# Patient Record
Sex: FEMALE | Race: BLACK OR AFRICAN AMERICAN | NOT HISPANIC OR LATINO | Employment: UNEMPLOYED | URBAN - METROPOLITAN AREA
[De-identification: names, ages, dates, MRNs, and addresses within clinical notes are randomized per-mention and may not be internally consistent; named-entity substitution may affect disease eponyms.]

---

## 2017-06-30 ENCOUNTER — HOSPITAL ENCOUNTER (OUTPATIENT)
Facility: HOSPITAL | Age: 42
LOS: 1 days | Discharge: HOME | End: 2017-07-01
Attending: EMERGENCY MEDICINE | Admitting: HOSPITALIST

## 2017-06-30 DIAGNOSIS — R53.1 WEAKNESS: ICD-10-CM

## 2017-06-30 DIAGNOSIS — R29.898 LEG WEAKNESS: ICD-10-CM

## 2017-06-30 DIAGNOSIS — D64.9 ANEMIA, UNSPECIFIED TYPE: ICD-10-CM

## 2017-06-30 DIAGNOSIS — R29.818: Primary | ICD-10-CM

## 2017-06-30 PROBLEM — F50.83 PICA IN ADULTS: Status: ACTIVE | Noted: 2017-06-30

## 2017-06-30 PROBLEM — G44.52 NEW DAILY PERSISTENT HEADACHE: Status: ACTIVE | Noted: 2017-06-30

## 2017-06-30 PROBLEM — D50.9 MICROCYTIC ANEMIA: Status: ACTIVE | Noted: 2017-06-30

## 2017-06-30 PROBLEM — N93.9 ABNORMAL UTERINE BLEEDING: Status: ACTIVE | Noted: 2017-06-30

## 2017-06-30 PROBLEM — F50.89 PICA IN ADULTS: Status: ACTIVE | Noted: 2017-06-30

## 2017-06-30 LAB
ABO + RH BLD: NORMAL
ALBUMIN SERPL BCP-MCNC: 3.5 G/DL
ALP SERPL-CCNC: 76 U/L
ALT SERPL W/O P-5'-P-CCNC: 11 U/L
ANION GAP SERPL CALC-SCNC: 7 MMOL/L
ANISOCYTOSIS BLD QL SMEAR: ABNORMAL
AST SERPL-CCNC: 15 U/L
B-HCG UR QL: NEGATIVE
BASO STIPL BLD QL SMEAR: ABNORMAL
BASOPHILS # BLD AUTO: 0.01 K/UL
BASOPHILS NFR BLD: 0.1 %
BILIRUB SERPL-MCNC: 0.4 MG/DL
BILIRUB UR QL STRIP: NEGATIVE
BLD GP AB SCN CELLS X3 SERPL QL: NORMAL
BLD PROD TYP BPU: NORMAL
BLOOD UNIT EXPIRATION DATE: NORMAL
BLOOD UNIT TYPE CODE: 5100
BLOOD UNIT TYPE: NORMAL
BUN SERPL-MCNC: 11 MG/DL
BURR CELLS BLD QL SMEAR: ABNORMAL
CALCIUM SERPL-MCNC: 9 MG/DL
CHLORIDE SERPL-SCNC: 108 MMOL/L
CLARITY UR REFRACT.AUTO: ABNORMAL
CO2 SERPL-SCNC: 24 MMOL/L
CODING SYSTEM: NORMAL
COLOR UR AUTO: YELLOW
CREAT SERPL-MCNC: 0.8 MG/DL
CTP QC/QA: YES
DACRYOCYTES BLD QL SMEAR: ABNORMAL
DIFFERENTIAL METHOD: ABNORMAL
DISPENSE STATUS: NORMAL
EOSINOPHIL # BLD AUTO: 0.1 K/UL
EOSINOPHIL NFR BLD: 0.7 %
ERYTHROCYTE [DISTWIDTH] IN BLOOD BY AUTOMATED COUNT: 23.8 %
EST. GFR  (AFRICAN AMERICAN): >60 ML/MIN/1.73 M^2
EST. GFR  (NON AFRICAN AMERICAN): >60 ML/MIN/1.73 M^2
GIANT PLATELETS BLD QL SMEAR: PRESENT
GLUCOSE SERPL-MCNC: 116 MG/DL
GLUCOSE UR QL STRIP: NEGATIVE
HCT VFR BLD AUTO: 24.1 %
HGB BLD-MCNC: 6.5 G/DL
HGB UR QL STRIP: NEGATIVE
HYPOCHROMIA BLD QL SMEAR: ABNORMAL
KETONES UR QL STRIP: NEGATIVE
LEUKOCYTE ESTERASE UR QL STRIP: NEGATIVE
LYMPHOCYTES # BLD AUTO: 2 K/UL
LYMPHOCYTES NFR BLD: 29.5 %
MCH RBC QN AUTO: 16.3 PG
MCHC RBC AUTO-ENTMCNC: 27 %
MCV RBC AUTO: 60 FL
MONOCYTES # BLD AUTO: 0.3 K/UL
MONOCYTES NFR BLD: 4.1 %
NEUTROPHILS # BLD AUTO: 4.4 K/UL
NEUTROPHILS NFR BLD: 65.6 %
NITRITE UR QL STRIP: NEGATIVE
OVALOCYTES BLD QL SMEAR: ABNORMAL
PH UR STRIP: 6 [PH] (ref 5–8)
PLATELET # BLD AUTO: 402 K/UL
PLATELET BLD QL SMEAR: ABNORMAL
PMV BLD AUTO: ABNORMAL FL
POIKILOCYTOSIS BLD QL SMEAR: SLIGHT
POLYCHROMASIA BLD QL SMEAR: ABNORMAL
POTASSIUM SERPL-SCNC: 3.4 MMOL/L
PROT SERPL-MCNC: 7.6 G/DL
PROT UR QL STRIP: NEGATIVE
RBC # BLD AUTO: 3.99 M/UL
SODIUM SERPL-SCNC: 139 MMOL/L
SP GR UR STRIP: 1.01 (ref 1–1.03)
TARGETS BLD QL SMEAR: ABNORMAL
TRANS ERYTHROCYTES VOL PATIENT: NORMAL ML
URN SPEC COLLECT METH UR: ABNORMAL
UROBILINOGEN UR STRIP-ACNC: 2 EU/DL
WBC # BLD AUTO: 6.75 K/UL

## 2017-06-30 PROCEDURE — 86900 BLOOD TYPING SEROLOGIC ABO: CPT

## 2017-06-30 PROCEDURE — A9585 GADOBUTROL INJECTION: HCPCS | Performed by: EMERGENCY MEDICINE

## 2017-06-30 PROCEDURE — 80053 COMPREHEN METABOLIC PANEL: CPT

## 2017-06-30 PROCEDURE — P9021 RED BLOOD CELLS UNIT: HCPCS

## 2017-06-30 PROCEDURE — 85025 COMPLETE CBC W/AUTO DIFF WBC: CPT

## 2017-06-30 PROCEDURE — 81025 URINE PREGNANCY TEST: CPT | Performed by: EMERGENCY MEDICINE

## 2017-06-30 PROCEDURE — G0378 HOSPITAL OBSERVATION PER HR: HCPCS

## 2017-06-30 PROCEDURE — 25000003 PHARM REV CODE 250: Performed by: STUDENT IN AN ORGANIZED HEALTH CARE EDUCATION/TRAINING PROGRAM

## 2017-06-30 PROCEDURE — 25500020 PHARM REV CODE 255: Performed by: EMERGENCY MEDICINE

## 2017-06-30 PROCEDURE — 99223 1ST HOSP IP/OBS HIGH 75: CPT | Mod: ,,, | Performed by: PSYCHIATRY & NEUROLOGY

## 2017-06-30 PROCEDURE — 86850 RBC ANTIBODY SCREEN: CPT

## 2017-06-30 PROCEDURE — 99285 EMERGENCY DEPT VISIT HI MDM: CPT | Mod: 25

## 2017-06-30 PROCEDURE — 86920 COMPATIBILITY TEST SPIN: CPT

## 2017-06-30 PROCEDURE — 36430 TRANSFUSION BLD/BLD COMPNT: CPT

## 2017-06-30 PROCEDURE — 99285 EMERGENCY DEPT VISIT HI MDM: CPT | Mod: ,,, | Performed by: EMERGENCY MEDICINE

## 2017-06-30 PROCEDURE — 81003 URINALYSIS AUTO W/O SCOPE: CPT

## 2017-06-30 RX ORDER — PROMETHAZINE HYDROCHLORIDE 12.5 MG/1
25 TABLET ORAL EVERY 6 HOURS PRN
Status: DISCONTINUED | OUTPATIENT
Start: 2017-06-30 | End: 2017-07-01 | Stop reason: HOSPADM

## 2017-06-30 RX ORDER — ACETAMINOPHEN 325 MG/1
650 TABLET ORAL EVERY 4 HOURS PRN
Status: DISCONTINUED | OUTPATIENT
Start: 2017-06-30 | End: 2017-07-01 | Stop reason: HOSPADM

## 2017-06-30 RX ORDER — GADOBUTROL 604.72 MG/ML
10 INJECTION INTRAVENOUS
Status: COMPLETED | OUTPATIENT
Start: 2017-06-30 | End: 2017-06-30

## 2017-06-30 RX ORDER — HYDROCODONE BITARTRATE AND ACETAMINOPHEN 500; 5 MG/1; MG/1
TABLET ORAL
Status: DISCONTINUED | OUTPATIENT
Start: 2017-06-30 | End: 2017-07-01 | Stop reason: HOSPADM

## 2017-06-30 RX ORDER — AMOXICILLIN 250 MG
1 CAPSULE ORAL 2 TIMES DAILY PRN
Status: DISCONTINUED | OUTPATIENT
Start: 2017-06-30 | End: 2017-07-01 | Stop reason: HOSPADM

## 2017-06-30 RX ORDER — SODIUM CHLORIDE 0.9 % (FLUSH) 0.9 %
3 SYRINGE (ML) INJECTION EVERY 8 HOURS
Status: DISCONTINUED | OUTPATIENT
Start: 2017-07-01 | End: 2017-07-01 | Stop reason: HOSPADM

## 2017-06-30 RX ORDER — ONDANSETRON 8 MG/1
8 TABLET, ORALLY DISINTEGRATING ORAL EVERY 8 HOURS PRN
Status: DISCONTINUED | OUTPATIENT
Start: 2017-06-30 | End: 2017-07-01 | Stop reason: HOSPADM

## 2017-06-30 RX ORDER — POLYETHYLENE GLYCOL 3350 17 G/17G
17 POWDER, FOR SOLUTION ORAL DAILY
Status: DISCONTINUED | OUTPATIENT
Start: 2017-07-01 | End: 2017-07-01 | Stop reason: HOSPADM

## 2017-06-30 RX ORDER — POTASSIUM CHLORIDE 20 MEQ/1
60 TABLET, EXTENDED RELEASE ORAL ONCE
Status: COMPLETED | OUTPATIENT
Start: 2017-06-30 | End: 2017-06-30

## 2017-06-30 RX ADMIN — POTASSIUM CHLORIDE 60 MEQ: 1500 TABLET, EXTENDED RELEASE ORAL at 09:06

## 2017-06-30 RX ADMIN — GADOBUTROL 10 ML: 604.72 INJECTION INTRAVENOUS at 06:06

## 2017-06-30 NOTE — ED PROVIDER NOTES
"Encounter Date: 2017    SCRIBE #1 NOTE: I, Sabrina Kendrickangel, am scribing for, and in the presence of, Dr. Benavides.       History     Chief Complaint   Patient presents with    Extremity Weakness     pt states "my leg keeps giving out on me" states she can stand and walk, but after walking around-- left leg becomes "shakey and weak"-- states called EMS this am and was evaluated at home-      Time seen by provider: 3:49 PM    This is a 41 y.o. female with no significnat past medical history who presents with complaint of lower extremity weakness. The patient reports that she was sweeping today around 11 AM when her left leg all of a sudden gave out on her. She states she had some numbness to the foot and leg. She states she has never had this happen before. The patient states she is feeling some improvement but is still having some lower extremity weakness when walking any kind of distance. The also reports headache for several days. The patient denies any back problems or recent injury. She denies any changes in bowel or bladder habits. She states she felt totally normal this morning when she woke up. The patient's friend also mentions that there has been a lot going on in the patient's life recently as her sister unexpectedly  yesterday.       The history is provided by the patient.     Review of patient's allergies indicates:  No Known Allergies  Past Medical History:   Diagnosis Date    Hx of ischemic right ITZ stroke 2017     History reviewed. No pertinent surgical history.  History reviewed. No pertinent family history.  Social History   Substance Use Topics    Smoking status: Current Every Day Smoker     Packs/day: 1.00     Types: Cigarettes    Smokeless tobacco: Never Used    Alcohol use 0.6 oz/week     1 Shots of liquor per week      Comment: occassionaly      Review of Systems   Constitutional: Negative for chills and fever.   HENT: Negative for facial swelling and nosebleeds.    Eyes: " Negative for visual disturbance.   Respiratory: Negative for cough and shortness of breath.    Cardiovascular: Negative for chest pain and palpitations.   Gastrointestinal: Negative for abdominal distention, abdominal pain, diarrhea, nausea and vomiting.   Genitourinary: Negative for difficulty urinating, dysuria, frequency and hematuria.   Musculoskeletal: Positive for gait problem. Negative for neck pain and neck stiffness.   Skin: Negative for rash.   Neurological: Positive for weakness (left leg) and numbness. Negative for seizures, syncope and speech difficulty.       Physical Exam     Initial Vitals [06/30/17 1516]   BP Pulse Resp Temp SpO2   129/73 92 18 98.9 °F (37.2 °C) --      MAP       91.67         Physical Exam    Nursing note and vitals reviewed.  Constitutional: She appears well-developed and well-nourished. She is not diaphoretic. No distress.   Patient is comfortable and well appearing.    HENT:   Head: Normocephalic and atraumatic.   Eyes: Conjunctivae and EOM are normal.   Neck: Normal range of motion. Neck supple.   Cardiovascular: Normal rate and regular rhythm.   No murmur heard.  Pulmonary/Chest: Breath sounds normal. No respiratory distress.   Comfortable respirations   Abdominal: Soft. She exhibits no distension. There is no tenderness. There is no guarding.   Musculoskeletal: Normal range of motion.   Neurological: She is alert and oriented to person, place, and time. No cranial nerve deficit.   No pronator drift  lower extremity 5/5 strength bilaterally  Plantar and dorsal flexion of foot 5/5  Sensation intact to all dermatomes,  Gait normal  No ataxia.    Skin: Skin is warm and dry. No rash noted.         ED Course   Procedures  Labs Reviewed   CBC W/ AUTO DIFFERENTIAL - Abnormal; Notable for the following:        Result Value    RBC 3.99 (*)     Hemoglobin 6.5 (*)     Hematocrit 24.1 (*)     MCV 60 (*)     MCH 16.3 (*)     MCHC 27.0 (*)     RDW 23.8 (*)     Platelets 402 (*)     Platelet  "Estimate Increased (*)     All other components within normal limits   COMPREHENSIVE METABOLIC PANEL - Abnormal; Notable for the following:     Potassium 3.4 (*)     Glucose 116 (*)     Anion Gap 7 (*)     All other components within normal limits   URINALYSIS, REFLEX TO URINE CULTURE - Abnormal; Notable for the following:     Appearance, UA Hazy (*)     All other components within normal limits    Narrative:     Preferred Collection Type->Urine, Clean Catch   POCT URINE PREGNANCY - Normal   TYPE & SCREEN   PREPARE RBC SOFT          X-Rays:   Independently Interpreted Readings:   Head CT: Right frontal mass vs. infarct     Medical Decision Making:   History:   Old Medical Records: I decided to obtain old medical records.  Initial Assessment:   40 yo f, healthy, + smoker, here s/p episode L LE weakness/numbness, onset 11 am today, improved PTA, though unclear exact duration of sx.  Pt reports leg "gave out" but that while lying on floor, she couldn't move leg at all and couldn't feel it.  No HA/back pain, no urinary/bowel sx.  On exam, VSS, neurologic exam completely normal.  Differential Diagnosis:   ? TIA vs radiculopathy (though no back pain) vs somatization disorder (sister  4 days ago, unknown cause)  Independently Interpreted Test(s):   I have ordered and independently interpreted X-rays - see prior notes.  Clinical Tests:   Lab Tests: Reviewed and Ordered  Radiological Study: Reviewed and Ordered  ED Management:  -labs  -CT head  -stroke team notified  Other:   I have discussed this case with another health care provider.            Scribe Attestation:   Scribe #1: I performed the above scribed service and the documentation accurately describes the services I performed. I attest to the accuracy of the note.    Attending Attestation:           Physician Attestation for Scribe:  Physician Attestation Statement for Scribe #1: I, Dr. Benavides, reviewed documentation, as scribed by Sabrina Brown in my presence, " and it is both accurate and complete.                 ED Course     5:58 PM  Vascular neurology evaluated pt soon after arrival  Would like pt to get a MRI/MRA of brain after CT head, presuming it is negative    6:38 PM  Discussed CT findings with vascular neurology.  They have reviewed CT and think that underlying mass a possibility.  Would like to await MRI before making disposition plan      7:25 PM  MRI shows old stroke R frontal lobe.  Discussed results with neurology - would like pt admitted to medicine  Discussed results with pt - denies any h/o stroke, no h/o L LE weakness prior to today  Also discussed severe anemia - pt reports h/o heavy menstrual periods and uterine fibroids, suspect this as etiology  Consented for transfusion and 1 U PRBC ordered    Clinical Impression:   The primary encounter diagnosis was Transient paralysis of left leg. Diagnoses of Weakness, Leg weakness, and Anemia, unspecified type were also pertinent to this visit.                           Tammie Benavides MD  07/02/17 3299

## 2017-06-30 NOTE — ASSESSMENT & PLAN NOTE
40 y/o right handed female, heavy smoker, presents to the ED due to acute onset right leg weakness-numbness-jerkiness, substantially improved at this time.   NIHSS 0. CT head pending.  Patient has no ostensible abnormality on neuro-exam.   Although she is a heavy smoker, I am not quite convinced that she has sustained a cerebrovascular insult involving the left ITZ territory and resulting on restricted symptomatology in her right leg.  It is worth mentioning that patient is under significant stress due to the unexpected death of her sister yesterday.  Agree with pursuing MRI/MRA brain if CT negative.  She may be able to go home if neuro-imaging negative.

## 2017-06-30 NOTE — CONSULTS
"[facility]  Vascular Neurology  Comprehensive Stroke Center  Consult Note    Consults  Assessment/Plan:     Patient is a 41 y.o. year old female with:    No notes have been filed under this hospital service.  Service: Vascular Neurology      Thrombolysis Candidate? No, patient has no objective findings on neuro-exam    Interventional Revascularization Candidate?  No; No large vessel occlusion    Research Candidate? No    Subjective:     History of Present Illness:  41 y.o. Right handed female with no significnat past medical history except for heavy smoking who presents to the ED complaining of acute onset of right lower extremity weakness-numbness- jerkiness  Patient said that she never had similar symptoms before. Stated that she was home, sweeping today around 11 AM when her left leg all of a sudden became very numb. " have no feeling on it and gave out on me when I tried to stand up and walk". Likewise, she reports that the right leg was " shaking, did not let me walk, and I couldn't raise it".  Denies pain in her lower back or right LE. No bladder or bowel impairment, vertigo, double vision, slurred speech, language or vision impairment.  No recent trauma or heavy lifting. No fever, rash, or recent medical illnesses.  Patient tells me that she is feeling some improvement but is still having some lower extremity weakness when walking any kind of distance. The also reports headache for several days. The patient denies any back problems or recent injury. She denies any changes in bowel or bladder habits. She states she felt totally normal this morning when she woke up. The patient's friend also mentions that there has been a lot going on in the patient's life recently as her sister unexpectedly  yesterday.  CT brain was already requested and I pending.         History reviewed. No pertinent past medical history.  History reviewed. No pertinent surgical history.  History reviewed. No " pertinent family history.  Social History   Substance Use Topics    Smoking status: Current Every Day Smoker     Packs/day: 1.00     Types: Cigarettes    Smokeless tobacco: Never Used    Alcohol use 0.6 oz/week     1 Shots of liquor per week      Comment: occassionaly      Review of patient's allergies indicates:  No Known Allergies  Medications: I have reviewed the current medication administration record.      (Not in a hospital admission)    Review of Systems   Constitutional: Negative for chills, fatigue, fever and unexpected weight change.   HENT: Negative for drooling, facial swelling and trouble swallowing.    Eyes: Negative for photophobia and visual disturbance.   Respiratory: Negative for choking and shortness of breath.    Cardiovascular: Negative for chest pain, palpitations and leg swelling.   Gastrointestinal: Negative for abdominal pain and blood in stool.   Endocrine: Negative for cold intolerance and polyuria.   Genitourinary: Negative for hematuria.   Musculoskeletal: Negative for arthralgias, back pain, gait problem and neck pain.   Skin: Negative for color change and rash.   Allergic/Immunologic: Negative for immunocompromised state.   Neurological: Positive for weakness, numbness and headaches. Negative for dizziness, tremors and seizures.   Hematological: Negative for adenopathy. Does not bruise/bleed easily.   Psychiatric/Behavioral: Negative for agitation, dysphoric mood and hallucinations.     Objective:     Vital Signs (Most Recent):  Temp: 98.9 °F (37.2 °C) (06/30/17 1516)  Pulse: 92 (06/30/17 1516)  Resp: 18 (06/30/17 1516)  BP: 129/73 (06/30/17 1516)    Vital Signs Range (Last 24H):  Temp:  [98.9 °F (37.2 °C)]   Pulse:  [92]   Resp:  [18]   BP: (129)/(73)     Physical Exam   Constitutional: She is oriented to person, place, and time. She appears well-developed and well-nourished.   HENT:   Head: Normocephalic and atraumatic.   Eyes: EOM are normal. Pupils are equal, round, and  reactive to light.   Neck: Normal range of motion. Neck supple.   Cardiovascular: Normal rate, regular rhythm and intact distal pulses.    No murmur heard.  Pulmonary/Chest: Effort normal and breath sounds normal.   Abdominal: Soft. She exhibits no mass. There is no guarding.   Genitourinary:   Genitourinary Comments: No perforemed   Musculoskeletal: Normal range of motion. She exhibits no edema, tenderness or deformity.   Neurological: She is alert and oriented to person, place, and time. She has normal reflexes. She displays normal reflexes. No cranial nerve deficit. She exhibits normal muscle tone. Coordination normal.   Skin: Skin is warm and dry.   Psychiatric: She has a normal mood and affect. Her behavior is normal.       Neurological Exam:   Mental status: alert and awake, oriented x 4, comprehension, naming, and repetition intact. No right to left confusion. Performs serial 7's without difficulty .No dysarthria.  CN 2-12: pupils 4 mm bilaterally, reactive to light. Fundi without papilledema. Visual fields full to confrontation. EOM full without nystagmus. Face sensation normal in all distributions. Face symmetric. Hearing grossly intact. Palate elevates well. Tongue midline without atrophy or fasciculations.  Motor: 5/5 all over  Sensory: intact in all modalities.  DTR's: 2+ all over.  Plantars: no tested.  Coordination: finger to nose and heel-knee-shin intact.  Gait: no ataxia or bradykinesia        NIH Stroke Scale:  Interval: baseline (upon arrival/admit)  Level of Consciousness: 0 - alert  LOC Questions: 0 - answers both correctly  LOC Commands: 0 - performs both correctly  Best Gaze: 0 - normal  Visual: 0 - no visual loss  Facial Palsy: 0 - normal  Motor Left Arm: 0 - no drift  Motor Right Arm: 0 - no drift  Motor Left Le - no drift  Motor Right Le - no drift  Limb Ataxia: 0 - absent  Sensory: 0 - normal  Best Language: 0 - no aphasia  Dysarthria: 0 - normal articulation  Extinction and  Inattention: 0 - no neglect  NIH Stroke Scale Total: 0  Modified Crook Scale:   Timeline:  Modified Crook Score: 0 - no symptoms    ABCD2 Scale for TIA:   Age > or = 60: 0 - no  B/P or = 140/9 at Initial Evaluation: 0 - no  Clinical Features of TIA: 0 - other symptoms  Duration of Symptoms: 2 - > or equal to 60 minutes  Diabetes Mellitus in History: 0 - no  ABCD2 Scale Total: 2      Laboratory:  Pending    Diagnostic Results:  Brain Imaging: CT head pending    Cerebrovascular Imaging: No done    Cervical Vascular Imaging: No done    Cardiac Evaluation:   EKG/Telemetry: Sinus rhythm      Gary Fernandez MD  Com[facility] St[facility]  Department of Vascular Neurology   [facility]

## 2017-06-30 NOTE — HPI
"41 y.o. Right handed female with no significnat past medical history except for heavy smoking who presents to the ED complaining of acute onset of right lower extremity weakness-numbness- jerkiness  Patient said that she never had similar symptoms before. Stated that she was home, sweeping today around 11 AM when her left leg all of a sudden became very numb. " have no feeling on it and gave out on me when I tried to stand up and walk". Likewise, she reports that the right leg was " shaking, did not let me walk, and I couldn't raise it".  Denies pain in her lower back or right LE. No bladder or bowel impairment, vertigo, double vision, slurred speech, language or vision impairment.  No recent trauma or heavy lifting. No fever, rash, or recent medical illnesses.  Patient tells me that she is feeling some improvement but is still having some lower extremity weakness when walking any kind of distance. The also reports headache for several days. The patient denies any back problems or recent injury. She denies any changes in bowel or bladder habits. She states she felt totally normal this morning when she woke up. The patient's friend also mentions that there has been a lot going on in the patient's life recently as her sister unexpectedly  yesterday.  CT brain was already requested and I pending.  "

## 2017-06-30 NOTE — ED TRIAGE NOTES
"Pt presents to the ED c c/o LLE numbness and a feeling that "I was going to fall". Pt states that the numbing sensation began at approx 1100. Pt states that she called EMS for them to examine her. Pt's VSs were stable when EMS arrived and the pt stated that she would see if it would improve. Pt also c/o HA 6/10 (frontal) Pt's sister just passed away and pt deels stressed at this time.     Pt denies CP/SOA/dizziness/syncope/fever/chills.   "

## 2017-06-30 NOTE — SUBJECTIVE & OBJECTIVE
History reviewed. No pertinent past medical history.  History reviewed. No pertinent surgical history.  History reviewed. No pertinent family history.  Social History   Substance Use Topics    Smoking status: Current Every Day Smoker     Packs/day: 1.00     Types: Cigarettes    Smokeless tobacco: Never Used    Alcohol use 0.6 oz/week     1 Shots of liquor per week      Comment: occassionaly      Review of patient's allergies indicates:  No Known Allergies  Medications: I have reviewed the current medication administration record.      (Not in a hospital admission)    Review of Systems   Constitutional: Negative for chills, fatigue, fever and unexpected weight change.   HENT: Negative for drooling, facial swelling and trouble swallowing.    Eyes: Negative for photophobia and visual disturbance.   Respiratory: Negative for choking and shortness of breath.    Cardiovascular: Negative for chest pain, palpitations and leg swelling.   Gastrointestinal: Negative for abdominal pain and blood in stool.   Endocrine: Negative for cold intolerance and polyuria.   Genitourinary: Negative for hematuria.   Musculoskeletal: Negative for arthralgias, back pain, gait problem and neck pain.   Skin: Negative for color change and rash.   Allergic/Immunologic: Negative for immunocompromised state.   Neurological: Positive for weakness, numbness and headaches. Negative for dizziness, tremors and seizures.   Hematological: Negative for adenopathy. Does not bruise/bleed easily.   Psychiatric/Behavioral: Negative for agitation, dysphoric mood and hallucinations.     Objective:     Vital Signs (Most Recent):  Temp: 98.9 °F (37.2 °C) (06/30/17 1516)  Pulse: 92 (06/30/17 1516)  Resp: 18 (06/30/17 1516)  BP: 129/73 (06/30/17 1516)    Vital Signs Range (Last 24H):  Temp:  [98.9 °F (37.2 °C)]   Pulse:  [92]   Resp:  [18]   BP: (129)/(73)     Physical Exam   Constitutional: She is oriented to person, place, and time. She appears  well-developed and well-nourished.   HENT:   Head: Normocephalic and atraumatic.   Eyes: EOM are normal. Pupils are equal, round, and reactive to light.   Neck: Normal range of motion. Neck supple.   Cardiovascular: Normal rate, regular rhythm and intact distal pulses.    No murmur heard.  Pulmonary/Chest: Effort normal and breath sounds normal.   Abdominal: Soft. She exhibits no mass. There is no guarding.   Genitourinary:   Genitourinary Comments: No perforemed   Musculoskeletal: Normal range of motion. She exhibits no edema, tenderness or deformity.   Neurological: She is alert and oriented to person, place, and time. She has normal reflexes. She displays normal reflexes. No cranial nerve deficit. She exhibits normal muscle tone. Coordination normal.   Skin: Skin is warm and dry.   Psychiatric: She has a normal mood and affect. Her behavior is normal.       Neurological Exam:   Mental status: alert and awake, oriented x 4, comprehension, naming, and repetition intact. No right to left confusion. Performs serial 7's without difficulty .No dysarthria.  CN 2-12: pupils 4 mm bilaterally, reactive to light. Fundi without papilledema. Visual fields full to confrontation. EOM full without nystagmus. Face sensation normal in all distributions. Face symmetric. Hearing grossly intact. Palate elevates well. Tongue midline without atrophy or fasciculations.  Motor: 5/5 all over  Sensory: intact in all modalities.  DTR's: 2+ all over.  Plantars: no tested.  Coordination: finger to nose and heel-knee-shin intact.  Gait: no ataxia or bradykinesia        NIH Stroke Scale:  Interval: baseline (upon arrival/admit)  Level of Consciousness: 0 - alert  LOC Questions: 0 - answers both correctly  LOC Commands: 0 - performs both correctly  Best Gaze: 0 - normal  Visual: 0 - no visual loss  Facial Palsy: 0 - normal  Motor Left Arm: 0 - no drift  Motor Right Arm: 0 - no drift  Motor Left Le - no drift  Motor Right Le - no  drift  Limb Ataxia: 0 - absent  Sensory: 0 - normal  Best Language: 0 - no aphasia  Dysarthria: 0 - normal articulation  Extinction and Inattention: 0 - no neglect  NIH Stroke Scale Total: 0  Modified Greenwood Scale:   Timeline:  Modified Dania Score: 0 - no symptoms    ABCD2 Scale for TIA:   Age > or = 60: 0 - no  B/P or = 140/9 at Initial Evaluation: 0 - no  Clinical Features of TIA: 0 - other symptoms  Duration of Symptoms: 2 - > or equal to 60 minutes  Diabetes Mellitus in History: 0 - no  ABCD2 Scale Total: 2      Laboratory:  Pending    Diagnostic Results:  Brain Imaging: CT head pending    Cerebrovascular Imaging: No done    Cervical Vascular Imaging: No done    Cardiac Evaluation:   EKG/Telemetry: Sinus rhythm

## 2017-07-01 VITALS
WEIGHT: 165 LBS | SYSTOLIC BLOOD PRESSURE: 124 MMHG | RESPIRATION RATE: 18 BRPM | HEIGHT: 65 IN | HEART RATE: 63 BPM | TEMPERATURE: 98 F | BODY MASS INDEX: 27.49 KG/M2 | OXYGEN SATURATION: 99 % | DIASTOLIC BLOOD PRESSURE: 72 MMHG

## 2017-07-01 PROBLEM — Z86.73: Status: ACTIVE | Noted: 2017-07-01

## 2017-07-01 LAB
ANION GAP SERPL CALC-SCNC: 6 MMOL/L
ANISOCYTOSIS BLD QL SMEAR: SLIGHT
BASOPHILS # BLD AUTO: 0.01 K/UL
BASOPHILS NFR BLD: 0.1 %
BUN SERPL-MCNC: 11 MG/DL
CALCIUM SERPL-MCNC: 8.7 MG/DL
CHLORIDE SERPL-SCNC: 111 MMOL/L
CO2 SERPL-SCNC: 23 MMOL/L
CREAT SERPL-MCNC: 0.8 MG/DL
DIFFERENTIAL METHOD: ABNORMAL
EOSINOPHIL # BLD AUTO: 0.1 K/UL
EOSINOPHIL NFR BLD: 1.3 %
ERYTHROCYTE [DISTWIDTH] IN BLOOD BY AUTOMATED COUNT: 27 %
EST. GFR  (AFRICAN AMERICAN): >60 ML/MIN/1.73 M^2
EST. GFR  (NON AFRICAN AMERICAN): >60 ML/MIN/1.73 M^2
FERRITIN SERPL-MCNC: 4 NG/ML
GLUCOSE SERPL-MCNC: 99 MG/DL
HCT VFR BLD AUTO: 26.1 %
HGB BLD-MCNC: 7.6 G/DL
HYPOCHROMIA BLD QL SMEAR: ABNORMAL
INR PPP: 0.9
IRON SERPL-MCNC: 21 UG/DL
LYMPHOCYTES # BLD AUTO: 2.7 K/UL
LYMPHOCYTES NFR BLD: 35 %
MAGNESIUM SERPL-MCNC: 2.1 MG/DL
MCH RBC QN AUTO: 18.8 PG
MCHC RBC AUTO-ENTMCNC: 29.1 %
MCV RBC AUTO: 64 FL
MONOCYTES # BLD AUTO: 0.4 K/UL
MONOCYTES NFR BLD: 5.1 %
NEUTROPHILS # BLD AUTO: 4.6 K/UL
NEUTROPHILS NFR BLD: 58.5 %
OVALOCYTES BLD QL SMEAR: ABNORMAL
PHOSPHATE SERPL-MCNC: 3.4 MG/DL
PLATELET # BLD AUTO: 351 K/UL
PMV BLD AUTO: ABNORMAL FL
POIKILOCYTOSIS BLD QL SMEAR: SLIGHT
POLYCHROMASIA BLD QL SMEAR: ABNORMAL
POTASSIUM SERPL-SCNC: 4.3 MMOL/L
PROTHROMBIN TIME: 9.9 SEC
RBC # BLD AUTO: 4.05 M/UL
RETICS/RBC NFR AUTO: 0.8 %
SATURATED IRON: 6 %
SODIUM SERPL-SCNC: 140 MMOL/L
SPHEROCYTES BLD QL SMEAR: ABNORMAL
TOTAL IRON BINDING CAPACITY: 366 UG/DL
TRANSFERRIN SERPL-MCNC: 247 MG/DL
TSH SERPL DL<=0.005 MIU/L-ACNC: 1.32 UIU/ML
WBC # BLD AUTO: 7.82 K/UL

## 2017-07-01 PROCEDURE — 84100 ASSAY OF PHOSPHORUS: CPT

## 2017-07-01 PROCEDURE — 82728 ASSAY OF FERRITIN: CPT

## 2017-07-01 PROCEDURE — 25000003 PHARM REV CODE 250: Performed by: STUDENT IN AN ORGANIZED HEALTH CARE EDUCATION/TRAINING PROGRAM

## 2017-07-01 PROCEDURE — 97161 PT EVAL LOW COMPLEX 20 MIN: CPT

## 2017-07-01 PROCEDURE — 36415 COLL VENOUS BLD VENIPUNCTURE: CPT

## 2017-07-01 PROCEDURE — G8978 MOBILITY CURRENT STATUS: HCPCS | Mod: CI

## 2017-07-01 PROCEDURE — G8979 MOBILITY GOAL STATUS: HCPCS | Mod: CH

## 2017-07-01 PROCEDURE — G8980 MOBILITY D/C STATUS: HCPCS | Mod: CI

## 2017-07-01 PROCEDURE — 85025 COMPLETE CBC W/AUTO DIFF WBC: CPT

## 2017-07-01 PROCEDURE — 85610 PROTHROMBIN TIME: CPT

## 2017-07-01 PROCEDURE — 84443 ASSAY THYROID STIM HORMONE: CPT

## 2017-07-01 PROCEDURE — 80048 BASIC METABOLIC PNL TOTAL CA: CPT

## 2017-07-01 PROCEDURE — 85045 AUTOMATED RETICULOCYTE COUNT: CPT

## 2017-07-01 PROCEDURE — 99220 PR INITIAL OBSERVATION CARE,LEVL III: CPT | Mod: ,,, | Performed by: HOSPITALIST

## 2017-07-01 PROCEDURE — G0378 HOSPITAL OBSERVATION PER HR: HCPCS

## 2017-07-01 PROCEDURE — 83540 ASSAY OF IRON: CPT

## 2017-07-01 PROCEDURE — 83735 ASSAY OF MAGNESIUM: CPT

## 2017-07-01 PROCEDURE — 97116 GAIT TRAINING THERAPY: CPT

## 2017-07-01 RX ORDER — ATORVASTATIN CALCIUM 40 MG/1
40 TABLET, FILM COATED ORAL DAILY
Qty: 90 TABLET | Refills: 3 | Status: SHIPPED | OUTPATIENT
Start: 2017-07-01 | End: 2021-12-02 | Stop reason: SDUPTHER

## 2017-07-01 RX ORDER — FERROUS SULFATE 325(65) MG
325 TABLET, DELAYED RELEASE (ENTERIC COATED) ORAL 2 TIMES DAILY
Refills: 0 | COMMUNITY
Start: 2017-07-01 | End: 2017-07-01

## 2017-07-01 RX ORDER — NAPROXEN SODIUM 220 MG/1
81 TABLET, FILM COATED ORAL DAILY
Qty: 30 TABLET | Refills: 0 | COMMUNITY
Start: 2017-07-01 | End: 2017-07-01

## 2017-07-01 RX ORDER — FERROUS SULFATE 325(65) MG
325 TABLET, DELAYED RELEASE (ENTERIC COATED) ORAL 2 TIMES DAILY
Refills: 0 | COMMUNITY
Start: 2017-07-01 | End: 2021-07-25 | Stop reason: SDUPTHER

## 2017-07-01 RX ORDER — NAPROXEN SODIUM 220 MG/1
81 TABLET, FILM COATED ORAL DAILY
Qty: 30 TABLET | Refills: 0 | COMMUNITY
Start: 2017-07-01 | End: 2021-12-02 | Stop reason: SDUPTHER

## 2017-07-01 RX ORDER — ATORVASTATIN CALCIUM 40 MG/1
40 TABLET, FILM COATED ORAL DAILY
Qty: 90 TABLET | Refills: 3 | Status: SHIPPED | OUTPATIENT
Start: 2017-07-01 | End: 2017-07-01

## 2017-07-01 RX ADMIN — SODIUM CHLORIDE, PRESERVATIVE FREE 3 ML: 5 INJECTION INTRAVENOUS at 05:07

## 2017-07-01 RX ADMIN — ACETAMINOPHEN 650 MG: 325 TABLET ORAL at 12:07

## 2017-07-01 RX ADMIN — POLYETHYLENE GLYCOL 3350 17 G: 17 POWDER, FOR SOLUTION ORAL at 09:07

## 2017-07-01 NOTE — ASSESSMENT & PLAN NOTE
- Likely secondary to heavy menses, as reported by patient - using up to 10 pads daily.  - Fibroids or polyps most likely; may need transvaginal US either IP but likely as outpatient work-up.  - TSH, iron studies, coagulation studies ordered for tomorrow morning.  - With pica - eats starch.   - S/p 1U prbc. No active bleeding here.   - Daily CBC

## 2017-07-01 NOTE — CONSULTS
See consult note dated 06/30/2017 for full consult.    Erica Patrick, SHERWIN  Vascular Neurology

## 2017-07-01 NOTE — HPI
"Patti Hernandez is a 41 year old female who does not seek routine medical care who presents to the ED on 6/30 with one day history of lower extremity paralysis. Patient is not a good historian and is with flat and labile affect during the interview. Patient states that at 11am this morning, she was sweeping her floor in her house and suddenly felt numbness on the lateral side of her hip all the way down to the top of her foot. She states promptly after she lost complete motor function as well can couldn't feel her left lower extremity from her left hip down. She states that throughout the day, the leg would jerk occasionally and she would actually regain function before it going away promptly. She held off calling the ambulance hoping it would get better but eventually did. By the time she came to ED, both motor function was returning and sensory deficits went away gradually. She denies these symptoms occurring before in the past. She denies any change in urination or loss of bowel function. She does state that she in fact, only has a bowel movement 2-3 a month; she is sure of this. Otherwise, she has had a chronic banding headache for "some time" off and on daily. Patient does have heavy menstrual periods occasionally that started 2 years ago when she had a tubal ligation and sometimes the menstrual periods are normal, and sometimes they have heavy bleeding. She states her LMP ended several days ago and it was without heavy periods but her one last month she used 10+ daily. The duration of the periods are of 5 days. The patient states she enjoys eating starch. Patient denies any RLE deficits, denies back pain, denies recent illnesses, fevers, chills, nausea, vomiting.    Of note, patient states she has had significant stressors in her life. She had worked at [employer] but had to quit 2 months ago to take care of her mother who had a stroke and is bedridden. Her sister passed away suddenly 2 days ago (states that she " just stopped breathing - autopsy pending). Her father passed away from cancer one month ago. Her 12 year old daughter also passed away 2 years ago. Patient is a pack/day smoker

## 2017-07-01 NOTE — HOSPITAL COURSE
Imaging was done on patient including CT head without contrast, MRI and MRA brain, and MRA of neck with findings of no acute hemorrhage or infarct but with sequelae of old infarct in right frontal lobe and left cerebellum. Of note, patient's motor and sensory dysfunction resolved in ED. Labs were significant for hemoglobin of 6.5 and patient was transfused 1U PRBC.

## 2017-07-01 NOTE — DISCHARGE SUMMARY
DISCHARGE SUMMARY  Hospital Medicine    Team: [facility] MED 1    Patient Name: Patti Hernandez  YOB: 1975    Admit Date: 6/30/2017    Discharge Date: 07/01/2017    Discharge Attending Physician:  ARA TREVIÑO MD    Diagnoses:  Active Hospital Problems    Diagnosis  POA    *Transient paralysis of left leg [R29.818]  Yes     Priority: 1 - High    Microcytic anemia [D50.9]  Yes     Priority: 2     Abnormal uterine bleeding [N93.9]  Yes     Priority: 3     Pica in adults [F50.89]  Yes     Priority: 4     New daily persistent headache [G44.52]  Yes     Priority: 5     Hx of ischemic right ITZ stroke [Z86.73]  Not Applicable      Resolved Hospital Problems    Diagnosis Date Resolved POA   No resolved problems to display.       Discharged Condition: admit problems have stabilized       HOSPITAL COURSE:    Initial Presentation:  Patti Hernandez is a 41 year old female who does not seek routine medical care who presents to the ED on 6/30 with one day history of lower extremity paralysis. Patient is not a good historian and is with flat and labile affect during the interview. Patient states that at 11am this morning, she was sweeping her floor in her house and suddenly felt numbness on the lateral side of her hip all the way down to the top of her foot. She states promptly after she lost complete motor function as well can couldn't feel her left lower extremity from her left hip down. She states that throughout the day, the leg would jerk occasionally and she would actually regain function before it going away promptly. She held off calling the ambulance hoping it would get better but eventually did. By the time she came to ED, both motor function was returning and sensory deficits went away gradually. She denies these symptoms occurring before in the past. She denies any change in urination or loss of bowel function. She does state that she in fact, only has a bowel movement 2-3 a month; she is sure of  "this. Otherwise, she has had a chronic banding headache for "some time" off and on daily. Patient does have heavy menstrual periods occasionally that started 2 years ago when she had a tubal ligation and sometimes the menstrual periods are normal, and sometimes they have heavy bleeding. Patient states she often snacks on spoonfuls of corn starch. She states her LMP ended several days ago and it was without heavy periods but her one last month she used 10+ daily. The duration of the periods are of 5 days. The patient states she enjoys eating starch. Patient denies any RLE deficits, denies back pain, denies recent illnesses, fevers, chills, nausea, vomiting.    Of note, patient states she has had significant stressors in her life. She had worked at [employer] but had to quit 2 months ago to take care of her mother who had a stroke and is bedridden. Her sister passed away suddenly 2 days ago (states that she just stopped breathing - autopsy pending). Her father passed away from cancer one month ago. Her 12 year old daughter also passed away 2 years ago. Patient is a pack/day smoker      Course of Principle Problem for Admission:  Imaging was done on patient including CT head without contrast, MRI and MRA brain, and MRA of neck with findings of no acute hemorrhage or infarct but with sequelae of old infarct in right frontal lobe and left cerebellum. Of note, patient's motor and sensory dysfunction resolved in ED. Labs were significant for hemoglobin of 6.5 and patient was transfused 1U PRBC. Repeat hemoglobin on morning of 7/1 was 7.6 and patient denied any menorrhagia during her hospital course. It was believed that transient LLE paralysis was "unmasking" of past infarct triggered largely by iron deficiency anemia with contribution from social stressors. Physical therapy evaluated patient 7/ and did not recommend any home needs with AMPAC score of 23. She was discharged in stable condition on 7/1 with no motor/sensory " deficits and recommendations to follow-up here with vascular neurology and gynecology. She was discharged with recommendation to start on ASA 81 mg PO daily and atorvastatin 40 mg PO daily for secondary CVA prevention. She also instructed to start taking ferrous sulfate 350 mg by mouth twice daily for significant iron deficiency found here.       Review of Systems   Constitutional: Negative for appetite change, diaphoresis, fatigue, fever and unexpected weight change.   HENT: Negative for congestion.    Eyes: Negative for visual disturbance.   Respiratory: Negative for chest tightness and shortness of breath.    Cardiovascular: Negative for chest pain, palpitations and leg swelling.   Gastrointestinal: Positive for constipation. Negative for abdominal distention, diarrhea, nausea and vomiting.   Genitourinary: Positive for menstrual problem. Negative for urgency, vaginal bleeding, vaginal discharge and vaginal pain.   Musculoskeletal: Positive for arthralgias. Negative for back pain.   Skin: Negative for color change.   Neurological: Negative for dizziness, facial asymmetry, weakness and numbness.     Vital Signs (Most Recent):  Temp: 97.8 °F (36.6 °C) (07/01/17 1600)  Pulse: 63 (07/01/17 1600)  Resp: 18 (07/01/17 1600)  BP: 124/72 (07/01/17 1600)  SpO2: 99 % (07/01/17 1600) Vital Signs (24h Range):  Temp:  [97.2 °F (36.2 °C)-99.3 °F (37.4 °C)] 97.8 °F (36.6 °C)  Pulse:  [50-72] 63  Resp:  [15-20] 18  SpO2:  [97 %-100 %] 99 %  BP: (112-130)/(64-77) 124/72     Weight: 74.8 kg (165 lb)  Body mass index is 27.46 kg/m².    Intake/Output Summary (Last 24 hours) at 07/01/17 1716  Last data filed at 06/30/17 2245   Gross per 24 hour   Intake           752.92 ml   Output                0 ml   Net           752.92 ml      Physical Exam   Constitutional: She is oriented to person, place, and time. She appears well-developed and well-nourished. No distress.   HENT:   Head: Normocephalic and atraumatic.   Eyes: Conjunctivae and  EOM are normal. Pupils are equal, round, and reactive to light. No scleral icterus.   Neck:   Conjunctival pallor    Cardiovascular: Normal rate, regular rhythm and normal heart sounds.    No murmur heard.  Pulmonary/Chest: Effort normal and breath sounds normal. She has no wheezes.   Abdominal: Soft. Bowel sounds are normal. She exhibits no distension. There is no tenderness.   Musculoskeletal: Normal range of motion. She exhibits no edema.   5/5 UE and LE gross motor strength bilaterally.    Neurological: She is alert and oriented to person, place, and time. No cranial nerve deficit. Coordination (No gait abnormalities, walked to wall and back. ) normal.   Skin: Skin is warm and dry. She is not diaphoretic.     CONSULTS:    - Vascular Neurology     Other Pertinent Lab Findings:    Results for BIANKA KENNY (MRN [id number]) as of 7/1/2017 17:10   Ref. Range 6/30/2017 16:01 7/1/2017 04:27   Hemoglobin Latest Ref Range: 12.0 - 16.0 g/dL 6.5 (L) 7.6 (L)   Hematocrit Latest Ref Range: 37.0 - 48.5 % 24.1 (L) 26.1 (L)     Results for BIANKA KENNY (MRN [id number]) as of 7/1/2017 17:10   Ref. Range 7/1/2017 04:27   Iron Latest Ref Range: 30 - 160 ug/dL 21 (L)   TIBC Latest Ref Range: 250 - 450 ug/dL 366   Saturated Iron Latest Ref Range: 20 - 50 % 6 (L)   Transferrin Latest Ref Range: 200 - 375 mg/dL 247   Ferritin Latest Ref Range: 20.0 - 300.0 ng/mL 4 (L)       Pertinent/Significant Diagnostic Studies:    Imaging Results          MRI Brain W WO Contrast (Final result)  Result time 06/30/17 19:08:39   Procedure changed from MRI Brain Without Contrast     Final result by Zachery Gary MD (06/30/17 19:08:39)                 Impression:        No acute hemorrhage or infarct.    Foci of encephalomalacia within the right frontal lobe and left cerebellum, suggest sequela of previous infarct or insult.  No associated abnormal enhancement, mass effect, or edema.    No hemodynamically significant stenosis, occlusion, AV  malformation, or aneurysm of the anterior or posterior circulation, or within the major arterial structures of the neck.        Electronically signed by: HELEN CLARK MD  Date:     06/30/17  Time:    19:08              Narrative:    Comparison: CT 6/30/2017    Clinical history: Left leg weakness  Technique:    Multiplanar multi-sequence MRI images of the brain were obtained pre-and post the administration of Gadavist IV contrast.  MRI arteriography was performed of the [place] using time-of-flight technique.  Multiplanar multisequence MRI images of the neck were obtained pre-and post administration of IV gadavist contrast.        Findings:    There is an irregular focus of T2/flair signal abnormality within the periphery of the right frontal lobe, may reflect sequela of previous infarct or insult.  There is a remote appearing infarct involving the periphery of the left cerebellum.  There is no evidence of intracranial mass, hemorrhage, or infarction.  There is no restricted diffusion that would suggest acute ischemia.  The ventricular system is within normal limits of size for age and shows no evidence of hydrocephalus.  There are no significant extra-axial or extracranial abnormalities detected.    The globes, orbits, pituitary gland, pineal gland and craniocervical junction are normal in configuration.  Following administration of contrast, there are no abnormal areas of enhancement.  The major vascular flow voids are patent.    MRI arteriography was performed of the [place] using time-of-flight technique.  No hemodynamically significant stenosis, occlusion, 80 malformation, or aneurysm of the anterior or posterior circulation.  Bilateral posterior communicating arteries are identified.  The left vertebral artery is dominant.    The origins of the bilateral subclavian arteries are widely patent.  The origins of the bilateral common carotid arteries and internal carotid arteries are widely  patent as are the vessels themselves.  The origins of the bilateral vertebral arteries are widely patent.  The left vertebral artery is dominant.  Evaluation of the vertebral arteries is limited in its cervical portions as a result of venous contamination however no convincing high-grade stenosis or occlusion.                             MRA Brain without contrast (Final result)  Result time 06/30/17 19:08:38   Procedure changed from MRA Brain with contrast     Final result by Helen Gary MD (06/30/17 19:08:38)                 Impression:        No acute hemorrhage or infarct.    Foci of encephalomalacia within the right frontal lobe and left cerebellum, suggest sequela of previous infarct or insult.  No associated abnormal enhancement, mass effect, or edema.    No hemodynamically significant stenosis, occlusion, AV malformation, or aneurysm of the anterior or posterior circulation, or within the major arterial structures of the neck.        Electronically signed by: HELEN GARY MD  Date:     06/30/17  Time:    19:08              Narrative:    Comparison: CT 6/30/2017    Clinical history: Left leg weakness  Technique:    Multiplanar multi-sequence MRI images of the brain were obtained pre-and post the administration of Gadavist IV contrast.  MRI arteriography was performed of the [place] using time-of-flight technique.  Multiplanar multisequence MRI images of the neck were obtained pre-and post administration of IV gadavist contrast.        Findings:    There is an irregular focus of T2/flair signal abnormality within the periphery of the right frontal lobe, may reflect sequela of previous infarct or insult.  There is a remote appearing infarct involving the periphery of the left cerebellum.  There is no evidence of intracranial mass, hemorrhage, or infarction.  There is no restricted diffusion that would suggest acute ischemia.  The ventricular system is within normal limits of size for age and  shows no evidence of hydrocephalus.  There are no significant extra-axial or extracranial abnormalities detected.    The globes, orbits, pituitary gland, pineal gland and craniocervical junction are normal in configuration.  Following administration of contrast, there are no abnormal areas of enhancement.  The major vascular flow voids are patent.    MRI arteriography was performed of the [place] using time-of-flight technique.  No hemodynamically significant stenosis, occlusion, 80 malformation, or aneurysm of the anterior or posterior circulation.  Bilateral posterior communicating arteries are identified.  The left vertebral artery is dominant.    The origins of the bilateral subclavian arteries are widely patent.  The origins of the bilateral common carotid arteries and internal carotid arteries are widely patent as are the vessels themselves.  The origins of the bilateral vertebral arteries are widely patent.  The left vertebral artery is dominant.  Evaluation of the vertebral arteries is limited in its cervical portions as a result of venous contamination however no convincing high-grade stenosis or occlusion.                             MRA Neck with contrast (Final result)  Result time 06/30/17 19:08:38    Final result by Helen Gary MD (06/30/17 19:08:38)                 Impression:        No acute hemorrhage or infarct.    Foci of encephalomalacia within the right frontal lobe and left cerebellum, suggest sequela of previous infarct or insult.  No associated abnormal enhancement, mass effect, or edema.    No hemodynamically significant stenosis, occlusion, AV malformation, or aneurysm of the anterior or posterior circulation, or within the major arterial structures of the neck.        Electronically signed by: HELEN GARY MD  Date:     06/30/17  Time:    19:08              Narrative:    Comparison: CT 6/30/2017    Clinical history: Left leg weakness  Technique:    Multiplanar  multi-sequence MRI images of the brain were obtained pre-and post the administration of Gadavist IV contrast.  MRI arteriography was performed of the [place] using time-of-flight technique.  Multiplanar multisequence MRI images of the neck were obtained pre-and post administration of IV gadavist contrast.        Findings:    There is an irregular focus of T2/flair signal abnormality within the periphery of the right frontal lobe, may reflect sequela of previous infarct or insult.  There is a remote appearing infarct involving the periphery of the left cerebellum.  There is no evidence of intracranial mass, hemorrhage, or infarction.  There is no restricted diffusion that would suggest acute ischemia.  The ventricular system is within normal limits of size for age and shows no evidence of hydrocephalus.  There are no significant extra-axial or extracranial abnormalities detected.    The globes, orbits, pituitary gland, pineal gland and craniocervical junction are normal in configuration.  Following administration of contrast, there are no abnormal areas of enhancement.  The major vascular flow voids are patent.    MRI arteriography was performed of the [place] using time-of-flight technique.  No hemodynamically significant stenosis, occlusion, 80 malformation, or aneurysm of the anterior or posterior circulation.  Bilateral posterior communicating arteries are identified.  The left vertebral artery is dominant.    The origins of the bilateral subclavian arteries are widely patent.  The origins of the bilateral common carotid arteries and internal carotid arteries are widely patent as are the vessels themselves.  The origins of the bilateral vertebral arteries are widely patent.  The left vertebral artery is dominant.  Evaluation of the vertebral arteries is limited in its cervical portions as a result of venous contamination however no convincing high-grade stenosis or occlusion.                              CT Head Without Contrast (Final result)  Result time 06/30/17 18:07:54    Final result by Jame Lino MD (06/30/17 18:07:54)                 Impression:         2 regions of hypoattenuation with slight volume loss in the right inferior lateral frontal lobe, likely sequela of remote infarct or trauma; however, superimposed acute or subacute infarct is not excluded in light of the patient's history. No evidence of hemorrhage. Further evaluation/followup as warranted.  ______________________________________     Electronically signed by resident: DEMOND GARBER MD  Date:     06/30/17  Time:    17:54            As the supervising and teaching physician, I personally reviewed the images and resident's interpretation and I agree with the findings.          Electronically signed by: JAME LINO MD, MD  Date:     06/30/17  Time:    18:07              Narrative:    CT head without contrast    06/30/17 17:19:12    Accession# [id number]    CLINICAL INDICATION: 41 year old F with left leg weakness.    TECHNIQUE: Axial CT images obtained throughout the region of the head without the use of intravenous contrast. Axial, sagittal and coronal reconstructions were performed.    COMPARISON: No priors.    FINDINGS:    The ventricles are normal in size without evidence of hydrocephalus.    2 regions of peripheral hypoattenuation with slight volume loss extending to the cortex noted in the right inferior lateral frontal lobe with the larger noted to be more hypodense. These areas could represent sequela of remote infarct or trauma; however, a superimposed acute or subacute infarct cannot be excluded.    No parenchymal mass, hemorrhage, or edema.    No extra-axial blood or fluid collections.    The cranium appears intact. Mastoid air cells and paranasal sinuses are clear.                              Special Treatments/Procedures: *  - 1U PRBC transfusion    Disposition:  Home       Follow-up Plans from This  "Hospitalization:  We believe that your left lower leg weakness was secondary to an "unmasking" of a previous stroke that you had some time in the past. It could have been weeks, months, or even years ago. We believe this unmasking was triggered by stressors such as life events but also because of your low blood count because of heavy vaginal bleeding.    We have given you blood which has improved your blood count and likely helped resolve your paralysis, but we highly recommend you visit a gynecologist to have the bleeding worked up. You would likely need a pelvic ultrasound to remove fibroids.     Otherwise, because of your past stroke, we advise you to start taking a baby aspirin 81 mg that you can get over the counter. Take this daily. We also advise you take a cholesterol medication called Lipitor. Take 40 mg by mouth once daily - this was sent to the [facility] on [facility] in [place], [place].     For your blood counts, we HIGHLY recommend you start taking iron supplements; you can find them over the counter. Take one ferrous sulfate tablet by mouth TWICE daily.     We will refer you to our neurology and gynecology department, and they will call you to set up an appointment. If not, please give them a call. I will also speak with our  to try to get you into see [facility] gynecology as it would likely be more cost -efficient.    Last CBC/BMP/HgbA1c (if applicable):  Recent Results (from the past 336 hour(s))   CBC auto differential    Collection Time: 07/01/17  4:27 AM   Result Value Ref Range    WBC 7.82 3.90 - 12.70 K/uL    Hemoglobin 7.6 (L) 12.0 - 16.0 g/dL    Hematocrit 26.1 (L) 37.0 - 48.5 %    Platelets 351 (H) 150 - 350 K/uL   CBC auto differential    Collection Time: 06/30/17  4:01 PM   Result Value Ref Range    WBC 6.75 3.90 - 12.70 K/uL    Hemoglobin 6.5 (L) 12.0 - 16.0 g/dL    Hematocrit 24.1 (L) 37.0 - 48.5 %    Platelets 402 (H) 150 - 350 K/uL     Recent Results (from the past 336 hour(s)) "   Basic metabolic panel    Collection Time: 07/01/17  4:27 AM   Result Value Ref Range    Sodium 140 136 - 145 mmol/L    Potassium 4.3 3.5 - 5.1 mmol/L    Chloride 111 (H) 95 - 110 mmol/L    CO2 23 23 - 29 mmol/L    BUN, Bld 11 6 - 20 mg/dL    Creatinine 0.8 0.5 - 1.4 mg/dL    Calcium 8.7 8.7 - 10.5 mg/dL    Anion Gap 6 (L) 8 - 16 mmol/L     No results found for: HGBA1C    Discharge Medication List:     Medication List      START taking these medications    aspirin 81 MG Chew  Take 1 tablet (81 mg total) by mouth once daily.     atorvastatin 40 MG tablet  Commonly known as:  LIPITOR  Take 1 tablet (40 mg total) by mouth once daily.     ferrous sulfate 325 (65 FE) MG EC tablet  Take 1 tablet (325 mg total) by mouth 2 (two) times daily.        ASK your doctor about these medications    amoxicillin 500 MG capsule  Commonly known as:  AMOXIL     hydrocodone-acetaminophen 5-325mg 5-325 mg per tablet  Commonly known as:  NORCO  Take 1 tablet by mouth every 4 (four) hours as needed.     promethazine 25 MG tablet  Commonly known as:  PHENERGAN  Take 1 tablet (25 mg total) by mouth every 6 (six) hours as needed for Nausea.           Where to Get Your Medications      These medications were sent to [facility] [address] [place] [address] [address] AT [facility] [facility]  [address] [facility] [facility] [address]    Phone:  [phone]   · atorvastatin 40 MG tablet     You can get these medications from any pharmacy    You don't need a prescription for these medications  · aspirin 81 MG Chew  · ferrous sulfate 325 (65 FE) MG EC tablet         Patient Instructions:    Discharge Procedure Orders  Ambulatory Referral to Vascular Neurology   Referral Priority: Routine Referral Type: Consultation   Referral Reason: Specialty Services Required    Requested Specialty: Vascular Neurology    Number of Visits Requested: 1      Ambulatory Referral to Gynecology   Referral Priority: Routine Referral Type:  Consultation   Referral Reason: Specialty Services Required    Requested Specialty: Gynecology    Number of Visits Requested: 1          Signing Physician:    Lorenzo Stern MD  PGY-1 Internal Medicine  [phone]

## 2017-07-01 NOTE — SUBJECTIVE & OBJECTIVE
History reviewed. No pertinent past medical history.    History reviewed. No pertinent surgical history.    Review of patient's allergies indicates:  No Known Allergies    No current facility-administered medications on file prior to encounter.      Current Outpatient Prescriptions on File Prior to Encounter   Medication Sig    [DISCONTINUED] amoxicillin (AMOXIL) 500 MG capsule Take 500 mg by mouth 3 (three) times daily.    [DISCONTINUED] hydrocodone-acetaminophen 5-325mg (NORCO) 5-325 mg per tablet Take 1 tablet by mouth every 4 (four) hours as needed.    [DISCONTINUED] promethazine (PHENERGAN) 25 MG tablet Take 1 tablet (25 mg total) by mouth every 6 (six) hours as needed for Nausea.     Family History     None        Social History Main Topics    Smoking status: Current Every Day Smoker     Packs/day: 1.00     Types: Cigarettes    Smokeless tobacco: Never Used    Alcohol use 0.6 oz/week     1 Shots of liquor per week      Comment: occassionaly     Drug use: No    Sexual activity: Yes     Partners: Male     Review of Systems   Constitutional: Positive for appetite change and fatigue. Negative for diaphoresis, fever and unexpected weight change.   HENT: Negative for congestion.    Eyes: Negative for visual disturbance.   Respiratory: Negative for chest tightness and shortness of breath.    Cardiovascular: Negative for chest pain, palpitations and leg swelling.   Gastrointestinal: Positive for constipation. Negative for abdominal distention, diarrhea, nausea and vomiting.   Genitourinary: Positive for menstrual problem and vaginal bleeding. Negative for urgency, vaginal discharge and vaginal pain.   Musculoskeletal: Positive for arthralgias. Negative for back pain.   Skin: Negative for color change.   Neurological: Positive for weakness and numbness. Negative for dizziness and facial asymmetry.     Objective:     Vital Signs (Most Recent):  Temp: 99.1 °F (37.3 °C) (06/30/17 2050)  Pulse: 64 (06/30/17  2050)  Resp: 16 (06/30/17 2050)  BP: 126/77 (06/30/17 2050)  SpO2: 100 % (06/30/17 2050) Vital Signs (24h Range):  Temp:  [98.9 °F (37.2 °C)-99.3 °F (37.4 °C)] 99.1 °F (37.3 °C)  Pulse:  [61-92] 64  Resp:  [16-20] 16  SpO2:  [99 %-100 %] 100 %  BP: (126-130)/(72-77) 126/77     Weight: 74.8 kg (165 lb)  Body mass index is 27.46 kg/m².    Physical Exam   Constitutional: She is oriented to person, place, and time. She appears well-developed and well-nourished. No distress.   HENT:   Head: Normocephalic and atraumatic.   Eyes: Conjunctivae and EOM are normal. Pupils are equal, round, and reactive to light. No scleral icterus.   Neck:   Conjunctival pallor    Cardiovascular: Normal rate, regular rhythm and normal heart sounds.    No murmur heard.  Pulmonary/Chest: Effort normal and breath sounds normal. She has no wheezes.   Abdominal: Soft. Bowel sounds are normal. She exhibits no distension. There is no tenderness.   Musculoskeletal: Normal range of motion. She exhibits no edema.   5/5 UE and LE gross motor strength bilaterally.    Neurological: She is alert and oriented to person, place, and time. No cranial nerve deficit. Coordination (No gait abnormalities, walked to wall and back. ) normal.   Skin: Skin is warm and dry. She is not diaphoretic.        Significant Labs:   CBC:   Recent Labs  Lab 06/30/17  1601   WBC 6.75   HGB 6.5*   HCT 24.1*   *     CMP:   Recent Labs  Lab 06/30/17  1601      K 3.4*      CO2 24   *   BUN 11   CREATININE 0.8   CALCIUM 9.0   PROT 7.6   ALBUMIN 3.5   BILITOT 0.4   ALKPHOS 76   AST 15   ALT 11   ANIONGAP 7*   EGFRNONAA >60.0       Significant Imaging:   Imaging Results          MRI Brain W WO Contrast (Final result)  Result time 06/30/17 19:08:39   Procedure changed from MRI Brain Without Contrast     Final result by Zachery Gary MD (06/30/17 19:08:39)                 Impression:        No acute hemorrhage or infarct.    Foci of encephalomalacia within  the right frontal lobe and left cerebellum, suggest sequela of previous infarct or insult.  No associated abnormal enhancement, mass effect, or edema.    No hemodynamically significant stenosis, occlusion, AV malformation, or aneurysm of the anterior or posterior circulation, or within the major arterial structures of the neck.        Electronically signed by: HELEN CLARK MD  Date:     06/30/17  Time:    19:08              Narrative:    Comparison: CT 6/30/2017    Clinical history: Left leg weakness  Technique:    Multiplanar multi-sequence MRI images of the brain were obtained pre-and post the administration of Gadavist IV contrast.  MRI arteriography was performed of the [place] using time-of-flight technique.  Multiplanar multisequence MRI images of the neck were obtained pre-and post administration of IV gadavist contrast.        Findings:    There is an irregular focus of T2/flair signal abnormality within the periphery of the right frontal lobe, may reflect sequela of previous infarct or insult.  There is a remote appearing infarct involving the periphery of the left cerebellum.  There is no evidence of intracranial mass, hemorrhage, or infarction.  There is no restricted diffusion that would suggest acute ischemia.  The ventricular system is within normal limits of size for age and shows no evidence of hydrocephalus.  There are no significant extra-axial or extracranial abnormalities detected.    The globes, orbits, pituitary gland, pineal gland and craniocervical junction are normal in configuration.  Following administration of contrast, there are no abnormal areas of enhancement.  The major vascular flow voids are patent.    MRI arteriography was performed of the [place] using time-of-flight technique.  No hemodynamically significant stenosis, occlusion, 80 malformation, or aneurysm of the anterior or posterior circulation.  Bilateral posterior communicating arteries are identified.   The left vertebral artery is dominant.    The origins of the bilateral subclavian arteries are widely patent.  The origins of the bilateral common carotid arteries and internal carotid arteries are widely patent as are the vessels themselves.  The origins of the bilateral vertebral arteries are widely patent.  The left vertebral artery is dominant.  Evaluation of the vertebral arteries is limited in its cervical portions as a result of venous contamination however no convincing high-grade stenosis or occlusion.                             MRA Brain without contrast (Final result)  Result time 06/30/17 19:08:38   Procedure changed from MRA Brain with contrast     Final result by Helen Gary MD (06/30/17 19:08:38)                 Impression:        No acute hemorrhage or infarct.    Foci of encephalomalacia within the right frontal lobe and left cerebellum, suggest sequela of previous infarct or insult.  No associated abnormal enhancement, mass effect, or edema.    No hemodynamically significant stenosis, occlusion, AV malformation, or aneurysm of the anterior or posterior circulation, or within the major arterial structures of the neck.        Electronically signed by: HELEN GARY MD  Date:     06/30/17  Time:    19:08              Narrative:    Comparison: CT 6/30/2017    Clinical history: Left leg weakness  Technique:    Multiplanar multi-sequence MRI images of the brain were obtained pre-and post the administration of Gadavist IV contrast.  MRI arteriography was performed of the [place] using time-of-flight technique.  Multiplanar multisequence MRI images of the neck were obtained pre-and post administration of IV gadavist contrast.        Findings:    There is an irregular focus of T2/flair signal abnormality within the periphery of the right frontal lobe, may reflect sequela of previous infarct or insult.  There is a remote appearing infarct involving the periphery of the left cerebellum.   There is no evidence of intracranial mass, hemorrhage, or infarction.  There is no restricted diffusion that would suggest acute ischemia.  The ventricular system is within normal limits of size for age and shows no evidence of hydrocephalus.  There are no significant extra-axial or extracranial abnormalities detected.    The globes, orbits, pituitary gland, pineal gland and craniocervical junction are normal in configuration.  Following administration of contrast, there are no abnormal areas of enhancement.  The major vascular flow voids are patent.    MRI arteriography was performed of the [place] using time-of-flight technique.  No hemodynamically significant stenosis, occlusion, 80 malformation, or aneurysm of the anterior or posterior circulation.  Bilateral posterior communicating arteries are identified.  The left vertebral artery is dominant.    The origins of the bilateral subclavian arteries are widely patent.  The origins of the bilateral common carotid arteries and internal carotid arteries are widely patent as are the vessels themselves.  The origins of the bilateral vertebral arteries are widely patent.  The left vertebral artery is dominant.  Evaluation of the vertebral arteries is limited in its cervical portions as a result of venous contamination however no convincing high-grade stenosis or occlusion.                             MRA Neck with contrast (Final result)  Result time 06/30/17 19:08:38    Final result by Zachery Gary MD (06/30/17 19:08:38)                 Impression:        No acute hemorrhage or infarct.    Foci of encephalomalacia within the right frontal lobe and left cerebellum, suggest sequela of previous infarct or insult.  No associated abnormal enhancement, mass effect, or edema.    No hemodynamically significant stenosis, occlusion, AV malformation, or aneurysm of the anterior or posterior circulation, or within the major arterial structures of the  neck.        Electronically signed by: HELEN CLARK MD  Date:     06/30/17  Time:    19:08              Narrative:    Comparison: CT 6/30/2017    Clinical history: Left leg weakness  Technique:    Multiplanar multi-sequence MRI images of the brain were obtained pre-and post the administration of Gadavist IV contrast.  MRI arteriography was performed of the [place] using time-of-flight technique.  Multiplanar multisequence MRI images of the neck were obtained pre-and post administration of IV gadavist contrast.        Findings:    There is an irregular focus of T2/flair signal abnormality within the periphery of the right frontal lobe, may reflect sequela of previous infarct or insult.  There is a remote appearing infarct involving the periphery of the left cerebellum.  There is no evidence of intracranial mass, hemorrhage, or infarction.  There is no restricted diffusion that would suggest acute ischemia.  The ventricular system is within normal limits of size for age and shows no evidence of hydrocephalus.  There are no significant extra-axial or extracranial abnormalities detected.    The globes, orbits, pituitary gland, pineal gland and craniocervical junction are normal in configuration.  Following administration of contrast, there are no abnormal areas of enhancement.  The major vascular flow voids are patent.    MRI arteriography was performed of the [place] using time-of-flight technique.  No hemodynamically significant stenosis, occlusion, 80 malformation, or aneurysm of the anterior or posterior circulation.  Bilateral posterior communicating arteries are identified.  The left vertebral artery is dominant.    The origins of the bilateral subclavian arteries are widely patent.  The origins of the bilateral common carotid arteries and internal carotid arteries are widely patent as are the vessels themselves.  The origins of the bilateral vertebral arteries are widely patent.  The left  vertebral artery is dominant.  Evaluation of the vertebral arteries is limited in its cervical portions as a result of venous contamination however no convincing high-grade stenosis or occlusion.                             CT Head Without Contrast (Final result)  Result time 06/30/17 18:07:54    Final result by Jame Lino MD (06/30/17 18:07:54)                 Impression:         2 regions of hypoattenuation with slight volume loss in the right inferior lateral frontal lobe, likely sequela of remote infarct or trauma; however, superimposed acute or subacute infarct is not excluded in light of the patient's history. No evidence of hemorrhage. Further evaluation/followup as warranted.  ______________________________________     Electronically signed by resident: DEMOND GARBER MD  Date:     06/30/17  Time:    17:54            As the supervising and teaching physician, I personally reviewed the images and resident's interpretation and I agree with the findings.          Electronically signed by: JAME LINO MD, MD  Date:     06/30/17  Time:    18:07              Narrative:    CT head without contrast    06/30/17 17:19:12    Accession# [id number]    CLINICAL INDICATION: 41 year old F with left leg weakness.    TECHNIQUE: Axial CT images obtained throughout the region of the head without the use of intravenous contrast. Axial, sagittal and coronal reconstructions were performed.    COMPARISON: No priors.    FINDINGS:    The ventricles are normal in size without evidence of hydrocephalus.    2 regions of peripheral hypoattenuation with slight volume loss extending to the cortex noted in the right inferior lateral frontal lobe with the larger noted to be more hypodense. These areas could represent sequela of remote infarct or trauma; however, a superimposed acute or subacute infarct cannot be excluded.    No parenchymal mass, hemorrhage, or edema.    No extra-axial blood or fluid collections.    The cranium appears  intact. Mastoid air cells and paranasal sinuses are clear.

## 2017-07-01 NOTE — PT/OT/SLP EVAL
"Physical Therapy  Evaluation/  DISCHARGE    Patti Hernandez   MRN: [id number]   Admitting Diagnosis: Transient paralysis of left leg    PT Received On: 07/01/17  PT Start Time: 1134     PT Stop Time: 1202    PT Total Time (min): 28 min       Billable Minutes:  Evaluation 19 and Gait Training 9    Diagnosis: Transient paralysis of left leg      Past Medical History:   Diagnosis Date    Hx of ischemic right ITZ stroke 7/1/2017      History reviewed. No pertinent surgical history.    Referring physician: Lorenzo Stern MD  Date referred to PT: 6/30/17    General Precautions: Standard, fall  Orthopedic Precautions: N/A   Braces: N/A       Do you have any cultural, spiritual, [employer] conflicts, given your current situation?: no    Patient History:  Lives With: child(carri), dependent  Living Arrangements: house  Home Accessibility: stairs to enter home  Home Layout: Able to live on 1st floor  Number of Stairs to Enter Home: 3  Stair Railings at Home: outside, present at both sides  Transportation Available: car, family or friend will provide  Equipment Currently Used at Home: none  DME owned (not currently used): none    Previous Level of Function:  Ambulation Skills: independent  Transfer Skills: independent  ADL Skills: independent  Work/Leisure Activity: independent    Subjective:  Communicated with nursing prior to session.  "I got my four kids at home."    Chief Complaint: Fatigue  Patient goals: To go home    Pain/Comfort  Pain Rating 1: 0/10      Objective:   Patient found with: peripheral IV     Cognitive Exam:  Oriented to: Person, Place, Time and Situation    Follows Commands/attention: Follows multi-step commands  Communication: clear/fluent  Safety awareness/insight to disability: intact    Physical Exam:  Postural examination/scapula alignment: Anterior pelvic tilt    Skin integrity: Visible skin intact  Edema: None noted     Sensation:   Intact  light/touch B LEs    Upper Extremity Range of Motion:  Right " Upper Extremity: WFL  Left Upper Extremity: WFL    Upper Extremity Strength:  Right Upper Extremity: WFL  Left Upper Extremity: WFL    Lower Extremity Range of Motion:  Right Lower Extremity: WFL  Left Lower Extremity: WFL    Lower Extremity Strength:  Right Lower Extremity: WFL  Left Lower Extremity: WFL     Fine motor coordination:  Intact  RLE heel shin and LLE heel shin    Gross motor coordination: WFL    Functional Mobility:  Bed Mobility:  Scooting/Bridging: Independent  Supine to Sit: Independent  Sit to Supine: Independent    Transfers:  Sit <> Stand Assistance: Independent  Sit <> Stand Assistive Device: No Assistive Device    Gait:   Gait Distance: Pt amb 980'  Assistance 1: Independent  Gait Assistive Device: No device  Gait Pattern: swing-through gait  Gait Deviation(s): decreased wayne    Balance:   Static Sit: NORMAL: No deviations seen in posture held statically  Dynamic Sit: GOOD-: Maintains balance through MODERATE excursions of active trunk movement,     Static Stand: NORMAL: No deviations seen in posture held statically  Dynamic stand: GOOD+: Independent gait (with or without assistive device)    Therapeutic Activities and Exercises:  Whiteboard updated  [place] 10x sit<>stand: 17.68sec, 2/10 RPE following  6MWT: Pt amb 882', I, without AD, 2/10 RPE following    AM-PAC 6 CLICK MOBILITY  How much help from another person does this patient currently need?   1 = Unable, Total/Dependent Assistance  2 = A lot, Maximum/Moderate Assistance  3 = A little, Minimum/Contact Guard/Supervision  4 = None, Modified [place]/Independent    Turning over in bed (including adjusting bedclothes, sheets and blankets)?: 4  Sitting down on and standing up from a chair with arms (e.g., wheelchair, bedside commode, etc.): 4  Moving from lying on back to sitting on the side of the bed?: 4  Moving to and from a bed to a chair (including a wheelchair)?: 4  Need to walk in hospital room?: 4  Climbing 3-5 steps with a  railing?: 3  Total Score: 23     AM-PAC Raw Score CMS G-Code Modifier Level of Impairment Assistance   6 % Total / Unable   7 - 9 CM 80 - 100% Maximal Assist   10 - 14 CL 60 - 80% Moderate Assist   15 - 19 CK 40 - 60% Moderate Assist   20 - 22 CJ 20 - 40% Minimal Assist   23 CI 1-20% SBA / CGA   24 CH 0% Independent/ Mod I     Patient left supine with all lines intact, call button in reach and nursing notified.    Assessment:   Patti Hernandez is a 41 y.o. female with a medical diagnosis of Transient paralysis of left leg and presents with no remarkable abnormalities or deficits in UE/LE mm strength.  No impairments in coordination or cognition, other than fatigue.  Pt tolerated PT tx session well, though was not interested in participating.  Pt is to be discharged at this time, I with all mobility, no falls risk.    Rehab identified problem list/impairments: Rehab identified problem list/impairments: weakness    Rehab potential is good.    Activity tolerance: Fair    Discharge recommendations: Discharge Facility/Level Of Care Needs: home     Barriers to discharge: Barriers to Discharge: None    Equipment recommendations: Equipment Needed After Discharge: none     GOALS:    Physical Therapy Goals     Not on file                PLAN:    Patient to be seen   to address the above listed problems via    Plan of Care expires:    Plan of Care reviewed with: patient          Sherri Barber, PT  07/01/2017

## 2017-07-01 NOTE — NURSING
Pt discharged home. VSS. AVS reviewed and understanding verbalized by patient. IV removed, tip intact. Pt states she no longer uses the pharmacy at [facility] and would like her prescription to be sent to the [facility] in [place]. Dr. Stern notified.

## 2017-07-01 NOTE — ED NOTES
Charge nurse notified of pt H/H 6/24 and the possibility of needing a bed for possible transfusion

## 2017-07-01 NOTE — DISCHARGE INSTRUCTIONS
"We believe that your left lower leg weakness was secondary to an "unmasking" of a previous stroke that you had some time in the past. It could have been weeks, months, or even years ago. We believe this unmasking was triggered by stressors such as life events but also because of your low blood count because of heavy vaginal bleeding.    We have given you blood which has improved your blood count and likely helped resolve your paralysis, but we highly recommend you visit a gynecologist to have the bleeding worked up. You would likely need a pelvic ultrasound to remove fibroids.     Otherwise, because of your past stroke, we advise you to start taking a baby aspirin 81 mg that you can get over the counter. Take this daily. We also advise you take a cholesterol medication called Lipitor. Take 40 mg by mouth once daily - this was sent to the [email or web address] in [place], [place].     For your blood counts, we HIGHLY recommend you start taking iron supplements; you can find them over the counter. Take one ferrous sulfate tablet by mouth TWICE daily.     We will refer you to our neurology and gynecology department, and they will call you to set up an appointment. If not, please give them a call. I will also speak with our  to try to get you into see [facility] gynecology as it would likely be more cost -efficient.   "

## 2017-07-01 NOTE — ASSESSMENT & PLAN NOTE
- Reported sudden onset LLE numbness this morning with development to loss of motor function. Waxing and waning, with some jerking motions as well. No previous strokes, per patient.   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  - Differential includes unmasking of old infarct (considering location of infarct on imaging matching patient's symptoms) with stressor from anemia as well as possible conversion with recent stressors (documented in HPI)  - Symptoms have resolve, continue to monitor for another day.  - PT/OT  - Vascular neurology following patient.

## 2017-07-01 NOTE — H&P
"[facility] Medicine  History & Physical    Patient Name: Patti Hernandez  MRN: [id number]  Admission Date: 6/30/2017  Attending Physician: Misty Horan MD   Primary Care Provider: Primary Doctor [facility] Medicine Team: [facility] [place] [facility] 1 Lorenzo Stern MD     Patient information was obtained from patient and ER records.     Subjective:     Principal Problem:Transient paralysis of left leg    Chief Complaint:   Chief Complaint   Patient presents with    Extremity Weakness     pt states "my leg keeps giving out on me" states she can stand and walk, but after walking around-- left leg becomes "shakey and weak"-- states called EMS this am and was evaluated at home-         HPI: Patti Hernandez is a 41 year old female who does not seek routine medical care who presents to the ED on 6/30 with one day history of lower extremity paralysis. Patient is not a good historian and is with flat and labile affect during the interview. Patient states that at 11am this morning, she was sweeping her floor in her house and suddenly felt numbness on the lateral side of her hip all the way down to the top of her foot. She states promptly after she lost complete motor function as well can couldn't feel her left lower extremity from her left hip down. She states that throughout the day, the leg would jerk occasionally and she would actually regain function before it going away promptly. She held off calling the ambulance hoping it would get better but eventually did. By the time she came to ED, both motor function was returning and sensory deficits went away gradually. She denies these symptoms occurring before in the past. She denies any change in urination or loss of bowel function. She does state that she in fact, only has a bowel movement 2-3 a month; she is sure of this. Otherwise, she has had a chronic banding headache for "some time" off and on daily. Patient does have heavy menstrual periods " occasionally that started 2 years ago when she had a tubal ligation and sometimes the menstrual periods are normal, and sometimes they have heavy bleeding. She states her LMP ended several days ago and it was without heavy periods but her one last month she used 10+ daily. The duration of the periods are of 5 days. The patient states she enjoys eating starch. Patient denies any RLE deficits, denies back pain, denies recent illnesses, fevers, chills, nausea, vomiting.    Of note, patient states she has had significant stressors in her life. She had worked at [employer] but had to quit 2 months ago to take care of her mother who had a stroke and is bedridden. Her sister passed away suddenly 2 days ago (states that she just stopped breathing - autopsy pending). Her father passed away from cancer one month ago. Her 12 year old daughter also passed away 2 years ago. Patient is a pack/day smoker     History reviewed. No pertinent past medical history.    History reviewed. No pertinent surgical history.    Review of patient's allergies indicates:  No Known Allergies    No current facility-administered medications on file prior to encounter.      Current Outpatient Prescriptions on File Prior to Encounter   Medication Sig    [DISCONTINUED] amoxicillin (AMOXIL) 500 MG capsule Take 500 mg by mouth 3 (three) times daily.    [DISCONTINUED] hydrocodone-acetaminophen 5-325mg (NORCO) 5-325 mg per tablet Take 1 tablet by mouth every 4 (four) hours as needed.    [DISCONTINUED] promethazine (PHENERGAN) 25 MG tablet Take 1 tablet (25 mg total) by mouth every 6 (six) hours as needed for Nausea.     Family History     None        Social History Main Topics    Smoking status: Current Every Day Smoker     Packs/day: 1.00     Types: Cigarettes    Smokeless tobacco: Never Used    Alcohol use 0.6 oz/week     1 Shots of liquor per week      Comment: occassionaly     Drug use: No    Sexual activity: Yes     Partners: Male     Review of  Systems   Constitutional: Positive for appetite change and fatigue. Negative for diaphoresis, fever and unexpected weight change.   HENT: Negative for congestion.    Eyes: Negative for visual disturbance.   Respiratory: Negative for chest tightness and shortness of breath.    Cardiovascular: Negative for chest pain, palpitations and leg swelling.   Gastrointestinal: Positive for constipation. Negative for abdominal distention, diarrhea, nausea and vomiting.   Genitourinary: Positive for menstrual problem and vaginal bleeding. Negative for urgency, vaginal discharge and vaginal pain.   Musculoskeletal: Positive for arthralgias. Negative for back pain.   Skin: Negative for color change.   Neurological: Positive for weakness and numbness. Negative for dizziness and facial asymmetry.     Objective:     Vital Signs (Most Recent):  Temp: 99.1 °F (37.3 °C) (06/30/17 2050)  Pulse: 64 (06/30/17 2050)  Resp: 16 (06/30/17 2050)  BP: 126/77 (06/30/17 2050)  SpO2: 100 % (06/30/17 2050) Vital Signs (24h Range):  Temp:  [98.9 °F (37.2 °C)-99.3 °F (37.4 °C)] 99.1 °F (37.3 °C)  Pulse:  [61-92] 64  Resp:  [16-20] 16  SpO2:  [99 %-100 %] 100 %  BP: (126-130)/(72-77) 126/77     Weight: 74.8 kg (165 lb)  Body mass index is 27.46 kg/m².    Physical Exam   Constitutional: She is oriented to person, place, and time. She appears well-developed and well-nourished. No distress.   HENT:   Head: Normocephalic and atraumatic.   Eyes: Conjunctivae and EOM are normal. Pupils are equal, round, and reactive to light. No scleral icterus.   Neck:   Conjunctival pallor    Cardiovascular: Normal rate, regular rhythm and normal heart sounds.    No murmur heard.  Pulmonary/Chest: Effort normal and breath sounds normal. She has no wheezes.   Abdominal: Soft. Bowel sounds are normal. She exhibits no distension. There is no tenderness.   Musculoskeletal: Normal range of motion. She exhibits no edema.   5/5 UE and LE gross motor strength bilaterally.     Neurological: She is alert and oriented to person, place, and time. No cranial nerve deficit. Coordination (No gait abnormalities, walked to wall and back. ) normal.   Skin: Skin is warm and dry. She is not diaphoretic.        Significant Labs:   CBC:   Recent Labs  Lab 06/30/17  1601   WBC 6.75   HGB 6.5*   HCT 24.1*   *     CMP:   Recent Labs  Lab 06/30/17  1601      K 3.4*      CO2 24   *   BUN 11   CREATININE 0.8   CALCIUM 9.0   PROT 7.6   ALBUMIN 3.5   BILITOT 0.4   ALKPHOS 76   AST 15   ALT 11   ANIONGAP 7*   EGFRNONAA >60.0       Significant Imaging:   Imaging Results          MRI Brain W WO Contrast (Final result)  Result time 06/30/17 19:08:39   Procedure changed from MRI Brain Without Contrast     Final result by Helen Gary MD (06/30/17 19:08:39)                 Impression:        No acute hemorrhage or infarct.    Foci of encephalomalacia within the right frontal lobe and left cerebellum, suggest sequela of previous infarct or insult.  No associated abnormal enhancement, mass effect, or edema.    No hemodynamically significant stenosis, occlusion, AV malformation, or aneurysm of the anterior or posterior circulation, or within the major arterial structures of the neck.        Electronically signed by: HELEN GARY MD  Date:     06/30/17  Time:    19:08              Narrative:    Comparison: CT 6/30/2017    Clinical history: Left leg weakness  Technique:    Multiplanar multi-sequence MRI images of the brain were obtained pre-and post the administration of Gadavist IV contrast.  MRI arteriography was performed of the [place] using time-of-flight technique.  Multiplanar multisequence MRI images of the neck were obtained pre-and post administration of IV gadavist contrast.        Findings:    There is an irregular focus of T2/flair signal abnormality within the periphery of the right frontal lobe, may reflect sequela of previous infarct or insult.  There is a  remote appearing infarct involving the periphery of the left cerebellum.  There is no evidence of intracranial mass, hemorrhage, or infarction.  There is no restricted diffusion that would suggest acute ischemia.  The ventricular system is within normal limits of size for age and shows no evidence of hydrocephalus.  There are no significant extra-axial or extracranial abnormalities detected.    The globes, orbits, pituitary gland, pineal gland and craniocervical junction are normal in configuration.  Following administration of contrast, there are no abnormal areas of enhancement.  The major vascular flow voids are patent.    MRI arteriography was performed of the [place] using time-of-flight technique.  No hemodynamically significant stenosis, occlusion, 80 malformation, or aneurysm of the anterior or posterior circulation.  Bilateral posterior communicating arteries are identified.  The left vertebral artery is dominant.    The origins of the bilateral subclavian arteries are widely patent.  The origins of the bilateral common carotid arteries and internal carotid arteries are widely patent as are the vessels themselves.  The origins of the bilateral vertebral arteries are widely patent.  The left vertebral artery is dominant.  Evaluation of the vertebral arteries is limited in its cervical portions as a result of venous contamination however no convincing high-grade stenosis or occlusion.                             MRA Brain without contrast (Final result)  Result time 06/30/17 19:08:38   Procedure changed from MRA Brain with contrast     Final result by Zachery Gary MD (06/30/17 19:08:38)                 Impression:        No acute hemorrhage or infarct.    Foci of encephalomalacia within the right frontal lobe and left cerebellum, suggest sequela of previous infarct or insult.  No associated abnormal enhancement, mass effect, or edema.    No hemodynamically significant stenosis, occlusion, AV  malformation, or aneurysm of the anterior or posterior circulation, or within the major arterial structures of the neck.        Electronically signed by: HELEN CLARK MD  Date:     06/30/17  Time:    19:08              Narrative:    Comparison: CT 6/30/2017    Clinical history: Left leg weakness  Technique:    Multiplanar multi-sequence MRI images of the brain were obtained pre-and post the administration of Gadavist IV contrast.  MRI arteriography was performed of the [place] using time-of-flight technique.  Multiplanar multisequence MRI images of the neck were obtained pre-and post administration of IV gadavist contrast.        Findings:    There is an irregular focus of T2/flair signal abnormality within the periphery of the right frontal lobe, may reflect sequela of previous infarct or insult.  There is a remote appearing infarct involving the periphery of the left cerebellum.  There is no evidence of intracranial mass, hemorrhage, or infarction.  There is no restricted diffusion that would suggest acute ischemia.  The ventricular system is within normal limits of size for age and shows no evidence of hydrocephalus.  There are no significant extra-axial or extracranial abnormalities detected.    The globes, orbits, pituitary gland, pineal gland and craniocervical junction are normal in configuration.  Following administration of contrast, there are no abnormal areas of enhancement.  The major vascular flow voids are patent.    MRI arteriography was performed of the [place] using time-of-flight technique.  No hemodynamically significant stenosis, occlusion, 80 malformation, or aneurysm of the anterior or posterior circulation.  Bilateral posterior communicating arteries are identified.  The left vertebral artery is dominant.    The origins of the bilateral subclavian arteries are widely patent.  The origins of the bilateral common carotid arteries and internal carotid arteries are widely  patent as are the vessels themselves.  The origins of the bilateral vertebral arteries are widely patent.  The left vertebral artery is dominant.  Evaluation of the vertebral arteries is limited in its cervical portions as a result of venous contamination however no convincing high-grade stenosis or occlusion.                             MRA Neck with contrast (Final result)  Result time 06/30/17 19:08:38    Final result by Helen Gary MD (06/30/17 19:08:38)                 Impression:        No acute hemorrhage or infarct.    Foci of encephalomalacia within the right frontal lobe and left cerebellum, suggest sequela of previous infarct or insult.  No associated abnormal enhancement, mass effect, or edema.    No hemodynamically significant stenosis, occlusion, AV malformation, or aneurysm of the anterior or posterior circulation, or within the major arterial structures of the neck.        Electronically signed by: HELEN GARY MD  Date:     06/30/17  Time:    19:08              Narrative:    Comparison: CT 6/30/2017    Clinical history: Left leg weakness  Technique:    Multiplanar multi-sequence MRI images of the brain were obtained pre-and post the administration of Gadavist IV contrast.  MRI arteriography was performed of the [place] using time-of-flight technique.  Multiplanar multisequence MRI images of the neck were obtained pre-and post administration of IV gadavist contrast.        Findings:    There is an irregular focus of T2/flair signal abnormality within the periphery of the right frontal lobe, may reflect sequela of previous infarct or insult.  There is a remote appearing infarct involving the periphery of the left cerebellum.  There is no evidence of intracranial mass, hemorrhage, or infarction.  There is no restricted diffusion that would suggest acute ischemia.  The ventricular system is within normal limits of size for age and shows no evidence of hydrocephalus.  There are no  significant extra-axial or extracranial abnormalities detected.    The globes, orbits, pituitary gland, pineal gland and craniocervical junction are normal in configuration.  Following administration of contrast, there are no abnormal areas of enhancement.  The major vascular flow voids are patent.    MRI arteriography was performed of the [place] using time-of-flight technique.  No hemodynamically significant stenosis, occlusion, 80 malformation, or aneurysm of the anterior or posterior circulation.  Bilateral posterior communicating arteries are identified.  The left vertebral artery is dominant.    The origins of the bilateral subclavian arteries are widely patent.  The origins of the bilateral common carotid arteries and internal carotid arteries are widely patent as are the vessels themselves.  The origins of the bilateral vertebral arteries are widely patent.  The left vertebral artery is dominant.  Evaluation of the vertebral arteries is limited in its cervical portions as a result of venous contamination however no convincing high-grade stenosis or occlusion.                             CT Head Without Contrast (Final result)  Result time 06/30/17 18:07:54    Final result by Jame Lino MD (06/30/17 18:07:54)                 Impression:         2 regions of hypoattenuation with slight volume loss in the right inferior lateral frontal lobe, likely sequela of remote infarct or trauma; however, superimposed acute or subacute infarct is not excluded in light of the patient's history. No evidence of hemorrhage. Further evaluation/followup as warranted.  ______________________________________     Electronically signed by resident: DEMOND GARBER MD  Date:     06/30/17  Time:    17:54            As the supervising and teaching physician, I personally reviewed the images and resident's interpretation and I agree with the findings.          Electronically signed by: JAME LINO MD, MD  Date:      06/30/17  Time:    18:07              Narrative:    CT head without contrast    06/30/17 17:19:12    Accession# [id number]    CLINICAL INDICATION: 41 year old F with left leg weakness.    TECHNIQUE: Axial CT images obtained throughout the region of the head without the use of intravenous contrast. Axial, sagittal and coronal reconstructions were performed.    COMPARISON: No priors.    FINDINGS:    The ventricles are normal in size without evidence of hydrocephalus.    2 regions of peripheral hypoattenuation with slight volume loss extending to the cortex noted in the right inferior lateral frontal lobe with the larger noted to be more hypodense. These areas could represent sequela of remote infarct or trauma; however, a superimposed acute or subacute infarct cannot be excluded.    No parenchymal mass, hemorrhage, or edema.    No extra-axial blood or fluid collections.    The cranium appears intact. Mastoid air cells and paranasal sinuses are clear.                                Assessment/Plan:     * Transient paralysis of left leg    - Reported sudden onset LLE numbness this morning with development to loss of motor function. Waxing and waning, with some jerking motions as well. No previous strokes, per patient.   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  - Differential includes unmasking of old infarct (considering location of infarct on imaging matching patient's symptoms) with stressor from anemia as well as possible conversion with recent stressors (documented in HPI)  - Symptoms have resolve, continue to monitor for another day.  - PT/OT  - Vascular neurology following patient.          Microcytic anemia    - Likely secondary to heavy menses, as reported by patient - using up to 10 pads daily.  - Fibroids or polyps most likely; may need  transvaginal US either IP but likely as outpatient work-up.  - TSH, iron studies, coagulation studies ordered for tomorrow morning.  - With pica - eats starch.   - S/p 1U prbc. No active bleeding here.   - Daily CBC          Abnormal uterine bleeding    - See microcytic anemia.           Pica in adults    - See microcytic anemia.           New daily persistent headache    - Likely secondary to anemia and stressors.  - Tylenol PO prn.             VTE Risk Mitigation         Ordered     Medium Risk of VTE  Once      06/30/17 2231     Place sequential compression device  Until discontinued      06/30/17 2231        Lorenzo Stern MD  PGY-1 Internal Medicine  [phone]    Department of Hospital Medicine   [facility]

## 2017-07-01 NOTE — SUBJECTIVE & OBJECTIVE
Interval Hi  Review of Systems   Constitutional: Negative for appetite change, diaphoresis, fatigue, fever and unexpected weight change.   HENT: Negative for congestion.    Eyes: Negative for visual disturbance.   Respiratory: Negative for chest tightness and shortness of breath.    Cardiovascular: Negative for chest pain, palpitations and leg swelling.   Gastrointestinal: Positive for constipation. Negative for abdominal distention, diarrhea, nausea and vomiting.   Genitourinary: Positive for menstrual problem. Negative for urgency, vaginal bleeding, vaginal discharge and vaginal pain.   Musculoskeletal: Positive for arthralgias. Negative for back pain.   Skin: Negative for color change.   Neurological: Negative for dizziness, facial asymmetry, weakness and numbness.     Objective:     Vital Signs (Most Recent):  Temp: 97.8 °F (36.6 °C) (07/01/17 1600)  Pulse: 63 (07/01/17 1600)  Resp: 18 (07/01/17 1600)  BP: 124/72 (07/01/17 1600)  SpO2: 99 % (07/01/17 1600) Vital Signs (24h Range):  Temp:  [97.2 °F (36.2 °C)-99.3 °F (37.4 °C)] 97.8 °F (36.6 °C)  Pulse:  [50-72] 63  Resp:  [15-20] 18  SpO2:  [97 %-100 %] 99 %  BP: (112-130)/(64-77) 124/72     Weight: 74.8 kg (165 lb)  Body mass index is 27.46 kg/m².    Intake/Output Summary (Last 24 hours) at 07/01/17 1716  Last data filed at 06/30/17 2245   Gross per 24 hour   Intake           752.92 ml   Output                0 ml   Net           752.92 ml      Physical Exam   Constitutional: She is oriented to person, place, and time. She appears well-developed and well-nourished. No distress.   HENT:   Head: Normocephalic and atraumatic.   Eyes: Conjunctivae and EOM are normal. Pupils are equal, round, and reactive to light. No scleral icterus.   Neck:   Conjunctival pallor    Cardiovascular: Normal rate, regular rhythm and normal heart sounds.    No murmur heard.  Pulmonary/Chest: Effort normal and breath sounds normal. She has no wheezes.   Abdominal: Soft. Bowel sounds are  normal. She exhibits no distension. There is no tenderness.   Musculoskeletal: Normal range of motion. She exhibits no edema.   5/5 UE and LE gross motor strength bilaterally.    Neurological: She is alert and oriented to person, place, and time. No cranial nerve deficit. Coordination (No gait abnormalities, walked to wall and back. ) normal.   Skin: Skin is warm and dry. She is not diaphoretic.

## 2019-06-07 ENCOUNTER — OFFICE VISIT (OUTPATIENT)
Dept: OBSTETRICS AND GYNECOLOGY | Facility: CLINIC | Age: 44
End: 2019-06-07
Payer: MEDICAID

## 2019-06-07 VITALS
SYSTOLIC BLOOD PRESSURE: 114 MMHG | DIASTOLIC BLOOD PRESSURE: 76 MMHG | WEIGHT: 160.88 LBS | HEIGHT: 65 IN | BODY MASS INDEX: 26.8 KG/M2

## 2019-06-07 DIAGNOSIS — Z12.39 SCREENING FOR BREAST CANCER: ICD-10-CM

## 2019-06-07 DIAGNOSIS — R39.15 URINARY URGENCY: ICD-10-CM

## 2019-06-07 DIAGNOSIS — Z01.419 WELL WOMAN EXAM WITH ROUTINE GYNECOLOGICAL EXAM: Primary | ICD-10-CM

## 2019-06-07 PROCEDURE — 99999 PR PBB SHADOW E&M-EST. PATIENT-LVL III: ICD-10-PCS | Mod: PBBFAC,,, | Performed by: OBSTETRICS & GYNECOLOGY

## 2019-06-07 PROCEDURE — 99386 PREV VISIT NEW AGE 40-64: CPT | Mod: S$PBB,,, | Performed by: OBSTETRICS & GYNECOLOGY

## 2019-06-07 PROCEDURE — 87086 URINE CULTURE/COLONY COUNT: CPT

## 2019-06-07 PROCEDURE — 99213 OFFICE O/P EST LOW 20 MIN: CPT | Mod: PBBFAC | Performed by: OBSTETRICS & GYNECOLOGY

## 2019-06-07 PROCEDURE — 88142 CYTOPATH C/V THIN LAYER: CPT

## 2019-06-07 PROCEDURE — 87624 HPV HI-RISK TYP POOLED RSLT: CPT

## 2019-06-07 PROCEDURE — 99999 PR PBB SHADOW E&M-EST. PATIENT-LVL III: CPT | Mod: PBBFAC,,, | Performed by: OBSTETRICS & GYNECOLOGY

## 2019-06-07 PROCEDURE — 99386 PR PREVENTIVE VISIT,NEW,40-64: ICD-10-PCS | Mod: S$PBB,,, | Performed by: OBSTETRICS & GYNECOLOGY

## 2019-06-07 RX ORDER — ETODOLAC 400 MG/1
TABLET, FILM COATED ORAL
Refills: 0 | COMMUNITY
Start: 2019-04-27 | End: 2021-02-12

## 2019-06-07 NOTE — PROGRESS NOTES
History & Physical  Gynecology      SUBJECTIVE:     Chief Complaint: Well Woman       History of Present Illness:  Annual Exam-Premenopausal  Patient presents for annual exam. The patient has complaints today of a smell in the urine for the past three weeks.  Does report frequency and urgency.  No dysuria. Menstrual cycles are once a month, regular.  The patient is sexually active. GYN screening history: last pap: approximate date >3 years ago and was normal. The patient participates in regular exercise: yes.  The patient does smoke.      Contraception: BTL    FH:  Breast cancer: none  Colon cancer: none  Ovarian cancer: none    Review of patient's allergies indicates:  No Known Allergies    Past Medical History:   Diagnosis Date    Hx of ischemic right ITZ stroke 2017     Past Surgical History:   Procedure Laterality Date     SECTION      x2    tubal ligation       OB History        6    Para        Term                AB        Living   6       SAB        TAB        Ectopic        Multiple        Live Births                   History reviewed. No pertinent family history.  Social History     Tobacco Use    Smoking status: Current Every Day Smoker     Packs/day: 1.00     Types: Cigarettes    Smokeless tobacco: Never Used   Substance Use Topics    Alcohol use: Yes     Alcohol/week: 0.6 oz     Types: 1 Shots of liquor per week     Comment: occassionaly     Drug use: No       Current Outpatient Medications   Medication Sig    etodolac (LODINE) 400 MG tablet TK ONE T PO BID FOR 5 DAYS.    ferrous sulfate 325 (65 FE) MG EC tablet Take 1 tablet (325 mg total) by mouth 2 (two) times daily.    aspirin 81 MG Chew Take 1 tablet (81 mg total) by mouth once daily.    atorvastatin (LIPITOR) 40 MG tablet Take 1 tablet (40 mg total) by mouth once daily.     No current facility-administered medications for this visit.          Review of Systems:  Review of Systems   Constitutional: Negative for  activity change, appetite change and fever.   Respiratory: Negative for shortness of breath.    Cardiovascular: Negative for chest pain.   Gastrointestinal: Negative for abdominal pain, constipation, diarrhea, nausea and vomiting.   Genitourinary: Positive for frequency and urgency. Negative for dysuria, menorrhagia, menstrual problem, pelvic pain, vaginal bleeding, vaginal discharge and vaginal pain.   Integumentary:  Negative for nipple discharge.   Neurological: Negative for numbness and headaches.   Breast: Negative for mastodynia and nipple discharge       OBJECTIVE:     Physical Exam:  Physical Exam   Constitutional: She is oriented to person, place, and time. She appears well-developed and well-nourished.   Neck: Normal range of motion. Neck supple. No tracheal deviation present. No thyromegaly present.   Cardiovascular: Normal rate, regular rhythm and normal heart sounds.   Pulmonary/Chest: Effort normal and breath sounds normal. Right breast exhibits no inverted nipple, no mass, no nipple discharge, no skin change and no tenderness. Left breast exhibits no inverted nipple, no mass, no nipple discharge, no skin change and no tenderness. Breasts are symmetrical.   Abdominal: Soft.   Genitourinary: Vagina normal and uterus normal. No labial fusion. There is no rash, tenderness, lesion or injury on the right labia. There is no rash, tenderness, lesion or injury on the left labia. Uterus is not deviated, not enlarged, not fixed and not tender. Cervix exhibits no motion tenderness, no discharge and no friability. Right adnexum displays no mass, no tenderness and no fullness. Left adnexum displays no mass, no tenderness and no fullness. No erythema, tenderness or bleeding in the vagina. No foreign body in the vagina. No signs of injury around the vagina. No vaginal discharge found.   Genitourinary Comments: Urethra: normal appearing urethra with no masses, tenderness or lesions  Urethral meatus: normal size,  anterior vaginal wall with no prolapse, no lesions   Neurological: She is alert and oriented to person, place, and time.   Psychiatric: She has a normal mood and affect. Her behavior is normal. Judgment and thought content normal.   Nursing note and vitals reviewed.      Chaperoned by: Breann    ASSESSMENT:       ICD-10-CM ICD-9-CM    1. Well woman exam with routine gynecological exam Z01.419 V72.31 Liquid-based pap smear, screening      HPV High Risk Genotypes, PCR   2. Screening for breast cancer Z12.31 V76.10 Mammo Digital Screening Bilat   3. Urinary urgency R39.15 788.63 Urine culture          Plan:      Patti was seen today for well woman.    Diagnoses and all orders for this visit:    Well woman exam with routine gynecological exam  -     Liquid-based pap smear, screening  -     HPV High Risk Genotypes, PCR    Screening for breast cancer  -     Mammo Digital Screening Bilat; Future    Urinary urgency  -     Urine culture        Orders Placed This Encounter   Procedures    HPV High Risk Genotypes, PCR    Urine culture    Mammo Digital Screening Bilat       Well Woman:  - Pap smear and hpv today  - Birth control: BTL  - GC/CT:n/a  - Mammogram: ordered/scheduled  - Smoking cessation: n/a  - Labs: none required   - Vaccines: none required  - Exercise counseling  - urine culture    Follow up in one year for annual or prn.    Berta Jiménez

## 2019-06-09 LAB — BACTERIA UR CULT: NORMAL

## 2019-06-12 LAB
HPV HR 12 DNA CVX QL NAA+PROBE: NEGATIVE
HPV16 AG SPEC QL: NEGATIVE
HPV18 DNA SPEC QL NAA+PROBE: NEGATIVE

## 2019-06-17 ENCOUNTER — HOSPITAL ENCOUNTER (OUTPATIENT)
Dept: RADIOLOGY | Facility: HOSPITAL | Age: 44
Discharge: HOME | End: 2019-06-17
Attending: OBSTETRICS & GYNECOLOGY
Payer: MEDICAID

## 2019-06-17 DIAGNOSIS — Z12.39 SCREENING FOR BREAST CANCER: ICD-10-CM

## 2019-06-17 PROCEDURE — 77063 MAMMO DIGITAL SCREENING BILAT WITH TOMOSYNTHESIS_CAD: ICD-10-PCS | Mod: 26,,, | Performed by: RADIOLOGY

## 2019-06-17 PROCEDURE — 77067 SCR MAMMO BI INCL CAD: CPT | Mod: 26,,, | Performed by: RADIOLOGY

## 2019-06-17 PROCEDURE — 77067 SCR MAMMO BI INCL CAD: CPT | Mod: TC

## 2019-06-17 PROCEDURE — 77067 MAMMO DIGITAL SCREENING BILAT WITH TOMOSYNTHESIS_CAD: ICD-10-PCS | Mod: 26,,, | Performed by: RADIOLOGY

## 2019-06-17 PROCEDURE — 77063 BREAST TOMOSYNTHESIS BI: CPT | Mod: 26,,, | Performed by: RADIOLOGY

## 2021-02-12 ENCOUNTER — HOSPITAL ENCOUNTER (OUTPATIENT)
Facility: HOSPITAL | Age: 46
Discharge: HOME | End: 2021-02-14
Attending: EMERGENCY MEDICINE | Admitting: HOSPITALIST
Payer: MEDICAID

## 2021-02-12 DIAGNOSIS — R06.00 DYSPNEA: ICD-10-CM

## 2021-02-12 DIAGNOSIS — D64.9 ANEMIA, UNSPECIFIED TYPE: Primary | ICD-10-CM

## 2021-02-12 DIAGNOSIS — F17.200 TOBACCO USE DISORDER: ICD-10-CM

## 2021-02-12 DIAGNOSIS — R10.13 EPIGASTRIC PAIN: ICD-10-CM

## 2021-02-12 DIAGNOSIS — D50.9 IRON DEFICIENCY ANEMIA, UNSPECIFIED IRON DEFICIENCY ANEMIA TYPE: ICD-10-CM

## 2021-02-12 DIAGNOSIS — N93.9 ABNORMAL UTERINE BLEEDING: ICD-10-CM

## 2021-02-12 PROBLEM — R35.89 POLYURIA: Status: ACTIVE | Noted: 2021-02-12

## 2021-02-12 PROBLEM — E87.6 HYPOKALEMIA: Status: ACTIVE | Noted: 2021-02-12

## 2021-02-12 PROBLEM — D69.6 THROMBOCYTOPENIA: Status: ACTIVE | Noted: 2021-02-12

## 2021-02-12 LAB
ABO + RH BLD: NORMAL
ALBUMIN SERPL BCP-MCNC: 3.9 G/DL (ref 3.5–5.2)
ALP SERPL-CCNC: 86 U/L (ref 55–135)
ALT SERPL W/O P-5'-P-CCNC: 16 U/L (ref 10–44)
ANION GAP SERPL CALC-SCNC: 9 MMOL/L (ref 8–16)
ANISOCYTOSIS BLD QL SMEAR: ABNORMAL
APTT BLDCRRT: 22.5 SEC (ref 21–32)
AST SERPL-CCNC: 20 U/L (ref 10–40)
BASOPHILS # BLD AUTO: 0.02 K/UL (ref 0–0.2)
BASOPHILS # BLD AUTO: 0.03 K/UL (ref 0–0.2)
BASOPHILS NFR BLD: 0.4 % (ref 0–1.9)
BASOPHILS NFR BLD: 0.4 % (ref 0–1.9)
BILIRUB SERPL-MCNC: 0.5 MG/DL (ref 0.1–1)
BLD GP AB SCN CELLS X3 SERPL QL: NORMAL
BLD PROD TYP BPU: NORMAL
BLOOD UNIT EXPIRATION DATE: NORMAL
BLOOD UNIT TYPE CODE: 5100
BLOOD UNIT TYPE: NORMAL
BUN SERPL-MCNC: 11 MG/DL (ref 6–20)
CALCIUM SERPL-MCNC: 8.5 MG/DL (ref 8.7–10.5)
CHLORIDE SERPL-SCNC: 106 MMOL/L (ref 95–110)
CO2 SERPL-SCNC: 22 MMOL/L (ref 23–29)
CODING SYSTEM: NORMAL
CREAT SERPL-MCNC: 0.8 MG/DL (ref 0.5–1.4)
CTP QC/QA: YES
DACRYOCYTES BLD QL SMEAR: ABNORMAL
DIFFERENTIAL METHOD: ABNORMAL
DIFFERENTIAL METHOD: ABNORMAL
DISPENSE STATUS: NORMAL
EOSINOPHIL # BLD AUTO: 0.1 K/UL (ref 0–0.5)
EOSINOPHIL # BLD AUTO: 0.2 K/UL (ref 0–0.5)
EOSINOPHIL NFR BLD: 2 % (ref 0–8)
EOSINOPHIL NFR BLD: 2.2 % (ref 0–8)
ERYTHROCYTE [DISTWIDTH] IN BLOOD BY AUTOMATED COUNT: 25.4 % (ref 11.5–14.5)
ERYTHROCYTE [DISTWIDTH] IN BLOOD BY AUTOMATED COUNT: 25.5 % (ref 11.5–14.5)
EST. GFR  (AFRICAN AMERICAN): >60 ML/MIN/1.73 M^2
EST. GFR  (NON AFRICAN AMERICAN): >60 ML/MIN/1.73 M^2
FERRITIN SERPL-MCNC: 1 NG/ML (ref 20–300)
GLUCOSE SERPL-MCNC: 129 MG/DL (ref 70–110)
HAPTOGLOB SERPL-MCNC: 191 MG/DL (ref 30–250)
HCT VFR BLD AUTO: 22.7 % (ref 37–48.5)
HCT VFR BLD AUTO: 22.8 % (ref 37–48.5)
HCV AB SERPL QL IA: NEGATIVE
HGB BLD-MCNC: 5.8 G/DL (ref 12–16)
HGB BLD-MCNC: 6 G/DL (ref 12–16)
HIV 1+2 AB+HIV1 P24 AG SERPL QL IA: NEGATIVE
HYPOCHROMIA BLD QL SMEAR: ABNORMAL
IMM GRANULOCYTES # BLD AUTO: 0.01 K/UL (ref 0–0.04)
IMM GRANULOCYTES # BLD AUTO: 0.03 K/UL (ref 0–0.04)
IMM GRANULOCYTES NFR BLD AUTO: 0.2 % (ref 0–0.5)
IMM GRANULOCYTES NFR BLD AUTO: 0.4 % (ref 0–0.5)
INR PPP: 1 (ref 0.8–1.2)
IRON SERPL-MCNC: 13 UG/DL (ref 30–160)
LDH SERPL L TO P-CCNC: 196 U/L (ref 110–260)
LYMPHOCYTES # BLD AUTO: 1.8 K/UL (ref 1–4.8)
LYMPHOCYTES # BLD AUTO: 2.2 K/UL (ref 1–4.8)
LYMPHOCYTES NFR BLD: 32.3 % (ref 18–48)
LYMPHOCYTES NFR BLD: 32.5 % (ref 18–48)
MCH RBC QN AUTO: 15.9 PG (ref 27–31)
MCH RBC QN AUTO: 15.9 PG (ref 27–31)
MCHC RBC AUTO-ENTMCNC: 25.6 G/DL (ref 32–36)
MCHC RBC AUTO-ENTMCNC: 26.3 G/DL (ref 32–36)
MCV RBC AUTO: 60 FL (ref 82–98)
MCV RBC AUTO: 62 FL (ref 82–98)
MONOCYTES # BLD AUTO: 0.3 K/UL (ref 0.3–1)
MONOCYTES # BLD AUTO: 0.5 K/UL (ref 0.3–1)
MONOCYTES NFR BLD: 6.1 % (ref 4–15)
MONOCYTES NFR BLD: 7.2 % (ref 4–15)
NEUTROPHILS # BLD AUTO: 3.2 K/UL (ref 1.8–7.7)
NEUTROPHILS # BLD AUTO: 3.9 K/UL (ref 1.8–7.7)
NEUTROPHILS NFR BLD: 57.5 % (ref 38–73)
NEUTROPHILS NFR BLD: 58.8 % (ref 38–73)
NRBC BLD-RTO: 0 /100 WBC
NRBC BLD-RTO: 0 /100 WBC
NUM UNITS TRANS PACKED RBC: NORMAL
OVALOCYTES BLD QL SMEAR: ABNORMAL
PATH REV BLD -IMP: NORMAL
PLATELET # BLD AUTO: 106 K/UL (ref 150–350)
PLATELET # BLD AUTO: 117 K/UL (ref 150–350)
PLATELET BLD QL SMEAR: ABNORMAL
PLATELET BLD QL SMEAR: ABNORMAL
PMV BLD AUTO: ABNORMAL FL (ref 9.2–12.9)
PMV BLD AUTO: ABNORMAL FL (ref 9.2–12.9)
POIKILOCYTOSIS BLD QL SMEAR: SLIGHT
POTASSIUM SERPL-SCNC: 3.3 MMOL/L (ref 3.5–5.1)
PROT SERPL-MCNC: 7.9 G/DL (ref 6–8.4)
PROTHROMBIN TIME: 10.5 SEC (ref 9–12.5)
RBC # BLD AUTO: 3.64 M/UL (ref 4–5.4)
RBC # BLD AUTO: 3.78 M/UL (ref 4–5.4)
RETICS/RBC NFR AUTO: 1.1 % (ref 0.5–2.5)
SARS-COV-2 RDRP RESP QL NAA+PROBE: NEGATIVE
SATURATED IRON: 3 % (ref 20–50)
SODIUM SERPL-SCNC: 137 MMOL/L (ref 136–145)
TOTAL IRON BINDING CAPACITY: 474 UG/DL (ref 250–450)
TRANSFERRIN SERPL-MCNC: 320 MG/DL (ref 200–375)
VIT B12 SERPL-MCNC: 355 PG/ML (ref 210–950)
WBC # BLD AUTO: 5.42 K/UL (ref 3.9–12.7)
WBC # BLD AUTO: 6.81 K/UL (ref 3.9–12.7)

## 2021-02-12 PROCEDURE — 85730 THROMBOPLASTIN TIME PARTIAL: CPT

## 2021-02-12 PROCEDURE — 86703 HIV-1/HIV-2 1 RESULT ANTBDY: CPT

## 2021-02-12 PROCEDURE — U0002 COVID-19 LAB TEST NON-CDC: HCPCS | Performed by: NURSE PRACTITIONER

## 2021-02-12 PROCEDURE — P9016 RBC LEUKOCYTES REDUCED: HCPCS

## 2021-02-12 PROCEDURE — G0378 HOSPITAL OBSERVATION PER HR: HCPCS

## 2021-02-12 PROCEDURE — 86920 COMPATIBILITY TEST SPIN: CPT

## 2021-02-12 PROCEDURE — 82728 ASSAY OF FERRITIN: CPT | Mod: 91

## 2021-02-12 PROCEDURE — 99220 PR INITIAL OBSERVATION CARE,LEVL III: ICD-10-PCS | Mod: ,,, | Performed by: INTERNAL MEDICINE

## 2021-02-12 PROCEDURE — 82607 VITAMIN B-12: CPT

## 2021-02-12 PROCEDURE — 96374 THER/PROPH/DIAG INJ IV PUSH: CPT

## 2021-02-12 PROCEDURE — 36415 COLL VENOUS BLD VENIPUNCTURE: CPT

## 2021-02-12 PROCEDURE — 86803 HEPATITIS C AB TEST: CPT

## 2021-02-12 PROCEDURE — 85045 AUTOMATED RETICULOCYTE COUNT: CPT

## 2021-02-12 PROCEDURE — 36430 TRANSFUSION BLD/BLD COMPNT: CPT

## 2021-02-12 PROCEDURE — 85060 PATHOLOGIST REVIEW: ICD-10-PCS | Mod: ,,, | Performed by: PATHOLOGY

## 2021-02-12 PROCEDURE — 99285 PR EMERGENCY DEPT VISIT,LEVEL V: ICD-10-PCS | Mod: ,,, | Performed by: NURSE PRACTITIONER

## 2021-02-12 PROCEDURE — 99285 EMERGENCY DEPT VISIT HI MDM: CPT | Mod: ,,, | Performed by: NURSE PRACTITIONER

## 2021-02-12 PROCEDURE — 86900 BLOOD TYPING SEROLOGIC ABO: CPT

## 2021-02-12 PROCEDURE — 85610 PROTHROMBIN TIME: CPT

## 2021-02-12 PROCEDURE — 85060 BLOOD SMEAR INTERPRETATION: CPT | Mod: ,,, | Performed by: PATHOLOGY

## 2021-02-12 PROCEDURE — 86677 HELICOBACTER PYLORI ANTIBODY: CPT

## 2021-02-12 PROCEDURE — 63600175 PHARM REV CODE 636 W HCPCS: Performed by: INTERNAL MEDICINE

## 2021-02-12 PROCEDURE — 80053 COMPREHEN METABOLIC PANEL: CPT

## 2021-02-12 PROCEDURE — 83540 ASSAY OF IRON: CPT

## 2021-02-12 PROCEDURE — C9113 INJ PANTOPRAZOLE SODIUM, VIA: HCPCS | Performed by: INTERNAL MEDICINE

## 2021-02-12 PROCEDURE — 85025 COMPLETE CBC W/AUTO DIFF WBC: CPT

## 2021-02-12 PROCEDURE — 25000003 PHARM REV CODE 250: Performed by: HOSPITALIST

## 2021-02-12 PROCEDURE — 83010 ASSAY OF HAPTOGLOBIN QUANT: CPT

## 2021-02-12 PROCEDURE — 25000003 PHARM REV CODE 250: Performed by: INTERNAL MEDICINE

## 2021-02-12 PROCEDURE — 82728 ASSAY OF FERRITIN: CPT

## 2021-02-12 PROCEDURE — 99220 PR INITIAL OBSERVATION CARE,LEVL III: CPT | Mod: ,,, | Performed by: INTERNAL MEDICINE

## 2021-02-12 PROCEDURE — 83615 LACTATE (LD) (LDH) ENZYME: CPT

## 2021-02-12 PROCEDURE — S4991 NICOTINE PATCH NONLEGEND: HCPCS | Performed by: INTERNAL MEDICINE

## 2021-02-12 PROCEDURE — 99285 EMERGENCY DEPT VISIT HI MDM: CPT | Mod: 25

## 2021-02-12 RX ORDER — NICOTINE 7MG/24HR
1 PATCH, TRANSDERMAL 24 HOURS TRANSDERMAL DAILY
Status: DISCONTINUED | OUTPATIENT
Start: 2021-02-12 | End: 2021-02-14 | Stop reason: HOSPADM

## 2021-02-12 RX ORDER — ATORVASTATIN CALCIUM 40 MG/1
40 TABLET, FILM COATED ORAL DAILY
Status: DISCONTINUED | OUTPATIENT
Start: 2021-02-13 | End: 2021-02-14 | Stop reason: HOSPADM

## 2021-02-12 RX ORDER — PANTOPRAZOLE SODIUM 40 MG/10ML
40 INJECTION, POWDER, LYOPHILIZED, FOR SOLUTION INTRAVENOUS ONCE
Status: COMPLETED | OUTPATIENT
Start: 2021-02-12 | End: 2021-02-12

## 2021-02-12 RX ORDER — FERROUS SULFATE 325(65) MG
325 TABLET, DELAYED RELEASE (ENTERIC COATED) ORAL DAILY
Status: DISCONTINUED | OUTPATIENT
Start: 2021-02-13 | End: 2021-02-14 | Stop reason: HOSPADM

## 2021-02-12 RX ORDER — ACETAMINOPHEN 325 MG/1
650 TABLET ORAL EVERY 6 HOURS PRN
Status: DISCONTINUED | OUTPATIENT
Start: 2021-02-12 | End: 2021-02-14 | Stop reason: HOSPADM

## 2021-02-12 RX ORDER — POTASSIUM CHLORIDE 20 MEQ/1
20 TABLET, EXTENDED RELEASE ORAL DAILY
Status: COMPLETED | OUTPATIENT
Start: 2021-02-13 | End: 2021-02-13

## 2021-02-12 RX ORDER — PANTOPRAZOLE SODIUM 40 MG/10ML
40 INJECTION, POWDER, LYOPHILIZED, FOR SOLUTION INTRAVENOUS 2 TIMES DAILY
Status: CANCELLED | OUTPATIENT
Start: 2021-02-12

## 2021-02-12 RX ORDER — HYDROCODONE BITARTRATE AND ACETAMINOPHEN 500; 5 MG/1; MG/1
TABLET ORAL
Status: DISCONTINUED | OUTPATIENT
Start: 2021-02-12 | End: 2021-02-14 | Stop reason: HOSPADM

## 2021-02-12 RX ORDER — TALC
6 POWDER (GRAM) TOPICAL NIGHTLY PRN
Status: CANCELLED | OUTPATIENT
Start: 2021-02-12

## 2021-02-12 RX ADMIN — ACETAMINOPHEN 650 MG: 325 TABLET ORAL at 08:02

## 2021-02-12 RX ADMIN — PANTOPRAZOLE SODIUM 40 MG: 40 INJECTION, POWDER, FOR SOLUTION INTRAVENOUS at 08:02

## 2021-02-12 RX ADMIN — NICOTINE 1 PATCH: 7 PATCH TRANSDERMAL at 07:02

## 2021-02-13 LAB
ALBUMIN SERPL BCP-MCNC: 3.4 G/DL (ref 3.5–5.2)
ALP SERPL-CCNC: 80 U/L (ref 55–135)
ALT SERPL W/O P-5'-P-CCNC: 14 U/L (ref 10–44)
ANION GAP SERPL CALC-SCNC: 9 MMOL/L (ref 8–16)
ANISOCYTOSIS BLD QL SMEAR: SLIGHT
ANISOCYTOSIS BLD QL SMEAR: SLIGHT
AST SERPL-CCNC: 18 U/L (ref 10–40)
BACTERIA #/AREA URNS AUTO: NORMAL /HPF
BASOPHILS # BLD AUTO: 0.02 K/UL (ref 0–0.2)
BASOPHILS # BLD AUTO: 0.02 K/UL (ref 0–0.2)
BASOPHILS NFR BLD: 0.3 % (ref 0–1.9)
BASOPHILS NFR BLD: 0.4 % (ref 0–1.9)
BILIRUB SERPL-MCNC: 0.4 MG/DL (ref 0.1–1)
BILIRUB UR QL STRIP: NEGATIVE
BLD PROD TYP BPU: NORMAL
BLOOD UNIT EXPIRATION DATE: NORMAL
BLOOD UNIT TYPE CODE: 5100
BLOOD UNIT TYPE: NORMAL
BUN SERPL-MCNC: 14 MG/DL (ref 6–20)
CALCIUM SERPL-MCNC: 8.6 MG/DL (ref 8.7–10.5)
CHLORIDE SERPL-SCNC: 109 MMOL/L (ref 95–110)
CLARITY UR REFRACT.AUTO: ABNORMAL
CO2 SERPL-SCNC: 23 MMOL/L (ref 23–29)
CODING SYSTEM: NORMAL
COLOR UR AUTO: YELLOW
CREAT SERPL-MCNC: 0.8 MG/DL (ref 0.5–1.4)
DACRYOCYTES BLD QL SMEAR: ABNORMAL
DIFFERENTIAL METHOD: ABNORMAL
DIFFERENTIAL METHOD: ABNORMAL
DISPENSE STATUS: NORMAL
EOSINOPHIL # BLD AUTO: 0.1 K/UL (ref 0–0.5)
EOSINOPHIL # BLD AUTO: 0.2 K/UL (ref 0–0.5)
EOSINOPHIL NFR BLD: 2.1 % (ref 0–8)
EOSINOPHIL NFR BLD: 2.4 % (ref 0–8)
ERYTHROCYTE [DISTWIDTH] IN BLOOD BY AUTOMATED COUNT: 28.6 % (ref 11.5–14.5)
ERYTHROCYTE [DISTWIDTH] IN BLOOD BY AUTOMATED COUNT: 29.4 % (ref 11.5–14.5)
EST. GFR  (AFRICAN AMERICAN): >60 ML/MIN/1.73 M^2
EST. GFR  (NON AFRICAN AMERICAN): >60 ML/MIN/1.73 M^2
ESTIMATED AVG GLUCOSE: 114 MG/DL (ref 68–131)
GIANT PLATELETS BLD QL SMEAR: PRESENT
GLUCOSE SERPL-MCNC: 104 MG/DL (ref 70–110)
GLUCOSE UR QL STRIP: NEGATIVE
H PYLORI IGG SERPL QL IA: POSITIVE
HBA1C MFR BLD: 5.6 % (ref 4–5.6)
HCT VFR BLD AUTO: 24.3 % (ref 37–48.5)
HCT VFR BLD AUTO: 31.2 % (ref 37–48.5)
HGB BLD-MCNC: 6.8 G/DL (ref 12–16)
HGB BLD-MCNC: 8.9 G/DL (ref 12–16)
HGB UR QL STRIP: ABNORMAL
HYALINE CASTS UR QL AUTO: 1 /LPF
HYPOCHROMIA BLD QL SMEAR: ABNORMAL
HYPOCHROMIA BLD QL SMEAR: ABNORMAL
IMM GRANULOCYTES # BLD AUTO: 0.01 K/UL (ref 0–0.04)
IMM GRANULOCYTES # BLD AUTO: 0.01 K/UL (ref 0–0.04)
IMM GRANULOCYTES NFR BLD AUTO: 0.2 % (ref 0–0.5)
IMM GRANULOCYTES NFR BLD AUTO: 0.2 % (ref 0–0.5)
KETONES UR QL STRIP: NEGATIVE
LEUKOCYTE ESTERASE UR QL STRIP: NEGATIVE
LYMPHOCYTES # BLD AUTO: 1.4 K/UL (ref 1–4.8)
LYMPHOCYTES # BLD AUTO: 1.9 K/UL (ref 1–4.8)
LYMPHOCYTES NFR BLD: 25.5 % (ref 18–48)
LYMPHOCYTES NFR BLD: 29 % (ref 18–48)
MAGNESIUM SERPL-MCNC: 2.1 MG/DL (ref 1.6–2.6)
MCH RBC QN AUTO: 18.1 PG (ref 27–31)
MCH RBC QN AUTO: 19.8 PG (ref 27–31)
MCHC RBC AUTO-ENTMCNC: 28 G/DL (ref 32–36)
MCHC RBC AUTO-ENTMCNC: 28.5 G/DL (ref 32–36)
MCV RBC AUTO: 65 FL (ref 82–98)
MCV RBC AUTO: 69 FL (ref 82–98)
MICROSCOPIC COMMENT: NORMAL
MONOCYTES # BLD AUTO: 0.5 K/UL (ref 0.3–1)
MONOCYTES # BLD AUTO: 0.5 K/UL (ref 0.3–1)
MONOCYTES NFR BLD: 7.8 % (ref 4–15)
MONOCYTES NFR BLD: 9.5 % (ref 4–15)
NEUTROPHILS # BLD AUTO: 3.3 K/UL (ref 1.8–7.7)
NEUTROPHILS # BLD AUTO: 4 K/UL (ref 1.8–7.7)
NEUTROPHILS NFR BLD: 60.3 % (ref 38–73)
NEUTROPHILS NFR BLD: 62.3 % (ref 38–73)
NITRITE UR QL STRIP: NEGATIVE
NRBC BLD-RTO: 0 /100 WBC
NRBC BLD-RTO: 0 /100 WBC
NUM UNITS TRANS PACKED RBC: NORMAL
OVALOCYTES BLD QL SMEAR: ABNORMAL
OVALOCYTES BLD QL SMEAR: ABNORMAL
PH UR STRIP: 6 [PH] (ref 5–8)
PLATELET # BLD AUTO: 107 K/UL (ref 150–350)
PLATELET # BLD AUTO: 94 K/UL (ref 150–350)
PLATELET BLD QL SMEAR: ABNORMAL
PLATELET BLD QL SMEAR: ABNORMAL
PMV BLD AUTO: ABNORMAL FL (ref 9.2–12.9)
PMV BLD AUTO: ABNORMAL FL (ref 9.2–12.9)
POIKILOCYTOSIS BLD QL SMEAR: SLIGHT
POIKILOCYTOSIS BLD QL SMEAR: SLIGHT
POLYCHROMASIA BLD QL SMEAR: ABNORMAL
POTASSIUM SERPL-SCNC: 4.1 MMOL/L (ref 3.5–5.1)
PROT SERPL-MCNC: 6.8 G/DL (ref 6–8.4)
PROT UR QL STRIP: NEGATIVE
RBC # BLD AUTO: 3.75 M/UL (ref 4–5.4)
RBC # BLD AUTO: 4.5 M/UL (ref 4–5.4)
RBC #/AREA URNS AUTO: 0 /HPF (ref 0–4)
SCHISTOCYTES BLD QL SMEAR: ABNORMAL
SODIUM SERPL-SCNC: 141 MMOL/L (ref 136–145)
SP GR UR STRIP: 1.02 (ref 1–1.03)
SQUAMOUS #/AREA URNS AUTO: 1 /HPF
TARGETS BLD QL SMEAR: ABNORMAL
URN SPEC COLLECT METH UR: ABNORMAL
WBC # BLD AUTO: 5.29 K/UL (ref 3.9–12.7)
WBC # BLD AUTO: 6.66 K/UL (ref 3.9–12.7)
WBC #/AREA URNS AUTO: 1 /HPF (ref 0–5)

## 2021-02-13 PROCEDURE — 25000003 PHARM REV CODE 250: Performed by: INTERNAL MEDICINE

## 2021-02-13 PROCEDURE — G0378 HOSPITAL OBSERVATION PER HR: HCPCS

## 2021-02-13 PROCEDURE — 87491 CHLMYD TRACH DNA AMP PROBE: CPT

## 2021-02-13 PROCEDURE — 87591 N.GONORRHOEAE DNA AMP PROB: CPT

## 2021-02-13 PROCEDURE — P9016 RBC LEUKOCYTES REDUCED: HCPCS

## 2021-02-13 PROCEDURE — 80053 COMPREHEN METABOLIC PANEL: CPT

## 2021-02-13 PROCEDURE — 81001 URINALYSIS AUTO W/SCOPE: CPT

## 2021-02-13 PROCEDURE — 99226 PR SUBSEQUENT OBSERVATION CARE,LEVEL III: CPT | Mod: ,,, | Performed by: PHYSICIAN ASSISTANT

## 2021-02-13 PROCEDURE — 85025 COMPLETE CBC W/AUTO DIFF WBC: CPT

## 2021-02-13 PROCEDURE — 99226 PR SUBSEQUENT OBSERVATION CARE,LEVEL III: ICD-10-PCS | Mod: ,,, | Performed by: PHYSICIAN ASSISTANT

## 2021-02-13 PROCEDURE — 83735 ASSAY OF MAGNESIUM: CPT

## 2021-02-13 PROCEDURE — 36415 COLL VENOUS BLD VENIPUNCTURE: CPT

## 2021-02-13 PROCEDURE — 83921 ORGANIC ACID SINGLE QUANT: CPT

## 2021-02-13 PROCEDURE — 83036 HEMOGLOBIN GLYCOSYLATED A1C: CPT

## 2021-02-13 PROCEDURE — S4991 NICOTINE PATCH NONLEGEND: HCPCS | Performed by: INTERNAL MEDICINE

## 2021-02-13 RX ORDER — ACETAMINOPHEN 325 MG/1
650 TABLET ORAL EVERY 4 HOURS PRN
Status: DISCONTINUED | OUTPATIENT
Start: 2021-02-13 | End: 2021-02-14 | Stop reason: HOSPADM

## 2021-02-13 RX ORDER — PROMETHAZINE HYDROCHLORIDE 25 MG/1
25 TABLET ORAL EVERY 6 HOURS PRN
Status: DISCONTINUED | OUTPATIENT
Start: 2021-02-13 | End: 2021-02-14 | Stop reason: HOSPADM

## 2021-02-13 RX ORDER — SODIUM CHLORIDE 0.9 % (FLUSH) 0.9 %
10 SYRINGE (ML) INJECTION
Status: DISCONTINUED | OUTPATIENT
Start: 2021-02-13 | End: 2021-02-14 | Stop reason: HOSPADM

## 2021-02-13 RX ORDER — HYDROCODONE BITARTRATE AND ACETAMINOPHEN 500; 5 MG/1; MG/1
TABLET ORAL
Status: DISCONTINUED | OUTPATIENT
Start: 2021-02-13 | End: 2021-02-14 | Stop reason: HOSPADM

## 2021-02-13 RX ORDER — TALC
6 POWDER (GRAM) TOPICAL NIGHTLY PRN
Status: DISCONTINUED | OUTPATIENT
Start: 2021-02-13 | End: 2021-02-14 | Stop reason: HOSPADM

## 2021-02-13 RX ORDER — ONDANSETRON 8 MG/1
8 TABLET, ORALLY DISINTEGRATING ORAL EVERY 8 HOURS PRN
Status: DISCONTINUED | OUTPATIENT
Start: 2021-02-13 | End: 2021-02-14 | Stop reason: HOSPADM

## 2021-02-13 RX ADMIN — NICOTINE 1 PATCH: 7 PATCH TRANSDERMAL at 10:02

## 2021-02-13 RX ADMIN — POTASSIUM CHLORIDE 20 MEQ: 1500 TABLET, EXTENDED RELEASE ORAL at 10:02

## 2021-02-13 RX ADMIN — ATORVASTATIN CALCIUM 40 MG: 40 TABLET, FILM COATED ORAL at 11:02

## 2021-02-13 RX ADMIN — FERROUS SULFATE TAB EC 325 MG (65 MG FE EQUIVALENT) 325 MG: 325 (65 FE) TABLET DELAYED RESPONSE at 10:02

## 2021-02-14 VITALS
OXYGEN SATURATION: 99 % | HEIGHT: 66 IN | BODY MASS INDEX: 26.68 KG/M2 | WEIGHT: 166 LBS | TEMPERATURE: 98 F | HEART RATE: 69 BPM | RESPIRATION RATE: 16 BRPM | DIASTOLIC BLOOD PRESSURE: 82 MMHG | SYSTOLIC BLOOD PRESSURE: 128 MMHG

## 2021-02-14 LAB
ANISOCYTOSIS BLD QL SMEAR: ABNORMAL
BASOPHILS # BLD AUTO: 0.02 K/UL (ref 0–0.2)
BASOPHILS NFR BLD: 0.3 % (ref 0–1.9)
DACRYOCYTES BLD QL SMEAR: ABNORMAL
DIFFERENTIAL METHOD: ABNORMAL
EOSINOPHIL # BLD AUTO: 0.2 K/UL (ref 0–0.5)
EOSINOPHIL NFR BLD: 2.3 % (ref 0–8)
ERYTHROCYTE [DISTWIDTH] IN BLOOD BY AUTOMATED COUNT: 29.8 % (ref 11.5–14.5)
GIANT PLATELETS BLD QL SMEAR: PRESENT
HCT VFR BLD AUTO: 33.2 % (ref 37–48.5)
HGB BLD-MCNC: 9.1 G/DL (ref 12–16)
HYPOCHROMIA BLD QL SMEAR: ABNORMAL
IMM GRANULOCYTES # BLD AUTO: 0.02 K/UL (ref 0–0.04)
IMM GRANULOCYTES NFR BLD AUTO: 0.3 % (ref 0–0.5)
LYMPHOCYTES # BLD AUTO: 1.8 K/UL (ref 1–4.8)
LYMPHOCYTES NFR BLD: 25.5 % (ref 18–48)
MCH RBC QN AUTO: 19 PG (ref 27–31)
MCHC RBC AUTO-ENTMCNC: 27.4 G/DL (ref 32–36)
MCV RBC AUTO: 69 FL (ref 82–98)
MONOCYTES # BLD AUTO: 0.6 K/UL (ref 0.3–1)
MONOCYTES NFR BLD: 8.6 % (ref 4–15)
NEUTROPHILS # BLD AUTO: 4.4 K/UL (ref 1.8–7.7)
NEUTROPHILS NFR BLD: 63 % (ref 38–73)
NRBC BLD-RTO: 0 /100 WBC
OVALOCYTES BLD QL SMEAR: ABNORMAL
PLATELET # BLD AUTO: 127 K/UL (ref 150–350)
PLATELET BLD QL SMEAR: ABNORMAL
PMV BLD AUTO: ABNORMAL FL (ref 9.2–12.9)
POIKILOCYTOSIS BLD QL SMEAR: SLIGHT
RBC # BLD AUTO: 4.79 M/UL (ref 4–5.4)
WBC # BLD AUTO: 6.95 K/UL (ref 3.9–12.7)

## 2021-02-14 PROCEDURE — 99226 PR SUBSEQUENT OBSERVATION CARE,LEVEL III: ICD-10-PCS | Mod: ,,, | Performed by: PHYSICIAN ASSISTANT

## 2021-02-14 PROCEDURE — S4991 NICOTINE PATCH NONLEGEND: HCPCS | Performed by: INTERNAL MEDICINE

## 2021-02-14 PROCEDURE — 99226 PR SUBSEQUENT OBSERVATION CARE,LEVEL III: CPT | Mod: ,,, | Performed by: PHYSICIAN ASSISTANT

## 2021-02-14 PROCEDURE — 25000003 PHARM REV CODE 250: Performed by: INTERNAL MEDICINE

## 2021-02-14 PROCEDURE — 85025 COMPLETE CBC W/AUTO DIFF WBC: CPT

## 2021-02-14 PROCEDURE — 36415 COLL VENOUS BLD VENIPUNCTURE: CPT

## 2021-02-14 PROCEDURE — G0378 HOSPITAL OBSERVATION PER HR: HCPCS

## 2021-02-14 RX ORDER — AMOXICILLIN 500 MG/1
1000 CAPSULE ORAL 2 TIMES DAILY
Qty: 40 CAPSULE | Refills: 0 | Status: SHIPPED | OUTPATIENT
Start: 2021-02-14 | End: 2021-02-24

## 2021-02-14 RX ORDER — CLARITHROMYCIN 500 MG/1
500 TABLET, FILM COATED ORAL 2 TIMES DAILY
Qty: 20 TABLET | Refills: 0 | Status: SHIPPED | OUTPATIENT
Start: 2021-02-14 | End: 2021-02-24

## 2021-02-14 RX ORDER — OMEPRAZOLE 20 MG/1
20 CAPSULE, DELAYED RELEASE ORAL 2 TIMES DAILY
Qty: 20 CAPSULE | Refills: 0 | Status: SHIPPED | OUTPATIENT
Start: 2021-02-14 | End: 2021-02-24

## 2021-02-14 RX ADMIN — ATORVASTATIN CALCIUM 40 MG: 40 TABLET, FILM COATED ORAL at 09:02

## 2021-02-14 RX ADMIN — NICOTINE 1 PATCH: 7 PATCH TRANSDERMAL at 09:02

## 2021-02-14 RX ADMIN — FERROUS SULFATE TAB EC 325 MG (65 MG FE EQUIVALENT) 325 MG: 325 (65 FE) TABLET DELAYED RESPONSE at 09:02

## 2021-02-15 LAB
C TRACH DNA SPEC QL NAA+PROBE: NOT DETECTED
N GONORRHOEA DNA SPEC QL NAA+PROBE: NOT DETECTED
PATH REV BLD -IMP: NORMAL

## 2021-02-16 LAB — METHYLMALONATE SERPL-SCNC: 0.63 UMOL/L

## 2021-02-22 LAB
FERRITIN SERPL-MCNC: NORMAL NG/ML
HEMATOLOGIST REVIEW: NORMAL
HGB A MFR BLD ELPH: NORMAL %
HGB A2 MFR BLD: NORMAL %
HGB F MFR BLD: NORMAL %
HGB FRACT BLD ELPH-IMP: NORMAL
HGB OTHER MFR BLD ELPH: NORMAL %
THEVP VARIANT 2: NORMAL
THEVP VARIANT 3: NORMAL

## 2021-02-23 LAB
FERRITIN SERPL-MCNC: 2 MCG/L (ref 11–307)
HEMATOLOGIST REVIEW: ABNORMAL
HGB A MFR BLD ELPH: 98.2 % (ref 95.8–98)
HGB A2 MFR BLD: 1.8 % (ref 2–3.3)
HGB F MFR BLD: 0 % (ref 0–0.9)
HGB FRACT BLD ELPH-IMP: ABNORMAL
HGB OTHER MFR BLD ELPH: 0 %
THEVP VARIANT 2: ABNORMAL
THEVP VARIANT 3: ABNORMAL

## 2021-03-02 ENCOUNTER — CLINICAL SUPPORT (OUTPATIENT)
Dept: SMOKING CESSATION | Facility: CLINIC | Age: 46
End: 2021-03-02
Payer: COMMERCIAL

## 2021-03-02 DIAGNOSIS — F17.200 NICOTINE DEPENDENCE: Primary | ICD-10-CM

## 2021-03-02 PROCEDURE — 99404 PREV MED CNSL INDIV APPRX 60: CPT | Mod: S$GLB,,,

## 2021-03-02 PROCEDURE — 99404 PR PREVENT COUNSEL,INDIV,60 MIN: ICD-10-PCS | Mod: S$GLB,,,

## 2021-03-02 RX ORDER — ASPIRIN/CALCIUM CARB/MAGNESIUM 325 MG
4 TABLET ORAL
Qty: 72 LOZENGE | Refills: 0 | Status: ON HOLD | OUTPATIENT
Start: 2021-03-02 | End: 2021-10-28 | Stop reason: HOSPADM

## 2021-03-02 RX ORDER — IBUPROFEN 200 MG
1 TABLET ORAL DAILY
Qty: 28 PATCH | Refills: 0 | Status: SHIPPED | OUTPATIENT
Start: 2021-03-02 | End: 2021-12-13 | Stop reason: SDUPTHER

## 2021-03-08 ENCOUNTER — TELEPHONE (OUTPATIENT)
Dept: SMOKING CESSATION | Facility: CLINIC | Age: 46
End: 2021-03-08

## 2021-03-15 ENCOUNTER — TELEPHONE (OUTPATIENT)
Dept: SMOKING CESSATION | Facility: CLINIC | Age: 46
End: 2021-03-15

## 2021-03-18 ENCOUNTER — TELEPHONE (OUTPATIENT)
Dept: SMOKING CESSATION | Facility: CLINIC | Age: 46
End: 2021-03-18

## 2021-03-22 ENCOUNTER — TELEPHONE (OUTPATIENT)
Dept: SMOKING CESSATION | Facility: CLINIC | Age: 46
End: 2021-03-22

## 2021-04-16 ENCOUNTER — PATIENT MESSAGE (OUTPATIENT)
Dept: RESEARCH | Facility: HOSPITAL | Age: 46
End: 2021-04-16

## 2021-07-01 ENCOUNTER — PATIENT MESSAGE (OUTPATIENT)
Dept: ADMINISTRATIVE | Facility: OTHER | Age: 46
End: 2021-07-01

## 2021-07-24 ENCOUNTER — HOSPITAL ENCOUNTER (EMERGENCY)
Facility: HOSPITAL | Age: 46
Discharge: HOME | End: 2021-07-25
Attending: EMERGENCY MEDICINE
Payer: MEDICAID

## 2021-07-24 DIAGNOSIS — D50.9 IRON DEFICIENCY ANEMIA, UNSPECIFIED IRON DEFICIENCY ANEMIA TYPE: Primary | ICD-10-CM

## 2021-07-24 LAB
B-HCG UR QL: NEGATIVE
BASOPHILS # BLD AUTO: 0.03 K/UL (ref 0–0.2)
BASOPHILS NFR BLD: 0.3 % (ref 0–1.9)
BUN SERPL-MCNC: 12 MG/DL (ref 6–30)
CHLORIDE SERPL-SCNC: 107 MMOL/L (ref 95–110)
CREAT SERPL-MCNC: 0.8 MG/DL (ref 0.5–1.4)
CTP QC/QA: YES
CTP QC/QA: YES
DIFFERENTIAL METHOD: ABNORMAL
EOSINOPHIL # BLD AUTO: 0.1 K/UL (ref 0–0.5)
EOSINOPHIL NFR BLD: 1.4 % (ref 0–8)
ERYTHROCYTE [DISTWIDTH] IN BLOOD BY AUTOMATED COUNT: 23.8 % (ref 11.5–14.5)
GLUCOSE SERPL-MCNC: 92 MG/DL (ref 70–110)
HCT VFR BLD AUTO: 23.1 % (ref 37–48.5)
HCT VFR BLD CALC: 22 %PCV (ref 36–54)
HGB BLD-MCNC: 6.1 G/DL (ref 12–16)
IMM GRANULOCYTES # BLD AUTO: 0.02 K/UL (ref 0–0.04)
IMM GRANULOCYTES NFR BLD AUTO: 0.2 % (ref 0–0.5)
LYMPHOCYTES # BLD AUTO: 2.6 K/UL (ref 1–4.8)
LYMPHOCYTES NFR BLD: 28.9 % (ref 18–48)
MCH RBC QN AUTO: 16.3 PG (ref 27–31)
MCHC RBC AUTO-ENTMCNC: 26.4 G/DL (ref 32–36)
MCV RBC AUTO: 62 FL (ref 82–98)
MONOCYTES # BLD AUTO: 0.6 K/UL (ref 0.3–1)
MONOCYTES NFR BLD: 6.8 % (ref 4–15)
NEUTROPHILS # BLD AUTO: 5.7 K/UL (ref 1.8–7.7)
NEUTROPHILS NFR BLD: 62.4 % (ref 38–73)
NRBC BLD-RTO: 0 /100 WBC
PLATELET # BLD AUTO: 273 K/UL (ref 150–450)
PMV BLD AUTO: ABNORMAL FL (ref 9.2–12.9)
POC IONIZED CALCIUM: 1.17 MMOL/L (ref 1.06–1.42)
POC TCO2 (MEASURED): 23 MMOL/L (ref 23–29)
POTASSIUM BLD-SCNC: 4.1 MMOL/L (ref 3.5–5.1)
RBC # BLD AUTO: 3.74 M/UL (ref 4–5.4)
SAMPLE: ABNORMAL
SARS-COV-2 RDRP RESP QL NAA+PROBE: NEGATIVE
SODIUM BLD-SCNC: 141 MMOL/L (ref 136–145)
WBC # BLD AUTO: 9.07 K/UL (ref 3.9–12.7)

## 2021-07-24 PROCEDURE — 80053 COMPREHEN METABOLIC PANEL: CPT | Performed by: EMERGENCY MEDICINE

## 2021-07-24 PROCEDURE — 99284 PR EMERGENCY DEPT VISIT,LEVEL IV: ICD-10-PCS | Mod: CR,CS,, | Performed by: EMERGENCY MEDICINE

## 2021-07-24 PROCEDURE — 86900 BLOOD TYPING SEROLOGIC ABO: CPT | Performed by: EMERGENCY MEDICINE

## 2021-07-24 PROCEDURE — 80047 BASIC METABLC PNL IONIZED CA: CPT | Mod: 59

## 2021-07-24 PROCEDURE — 99284 EMERGENCY DEPT VISIT MOD MDM: CPT | Mod: CR,CS,, | Performed by: EMERGENCY MEDICINE

## 2021-07-24 PROCEDURE — 85025 COMPLETE CBC W/AUTO DIFF WBC: CPT | Performed by: EMERGENCY MEDICINE

## 2021-07-24 PROCEDURE — 86920 COMPATIBILITY TEST SPIN: CPT | Performed by: EMERGENCY MEDICINE

## 2021-07-24 PROCEDURE — 99285 EMERGENCY DEPT VISIT HI MDM: CPT | Mod: 25

## 2021-07-24 PROCEDURE — 81025 URINE PREGNANCY TEST: CPT | Performed by: EMERGENCY MEDICINE

## 2021-07-24 PROCEDURE — U0002 COVID-19 LAB TEST NON-CDC: HCPCS | Performed by: EMERGENCY MEDICINE

## 2021-07-25 VITALS
BODY MASS INDEX: 31.89 KG/M2 | HEIGHT: 63 IN | WEIGHT: 180 LBS | DIASTOLIC BLOOD PRESSURE: 77 MMHG | OXYGEN SATURATION: 96 % | TEMPERATURE: 98 F | SYSTOLIC BLOOD PRESSURE: 142 MMHG | HEART RATE: 65 BPM | RESPIRATION RATE: 18 BRPM

## 2021-07-25 LAB
ABO + RH BLD: NORMAL
ALBUMIN SERPL BCP-MCNC: 3.6 G/DL (ref 3.5–5.2)
ALP SERPL-CCNC: 100 U/L (ref 55–135)
ALT SERPL W/O P-5'-P-CCNC: 18 U/L (ref 10–44)
ANION GAP SERPL CALC-SCNC: 12 MMOL/L (ref 8–16)
AST SERPL-CCNC: 17 U/L (ref 10–40)
BILIRUB SERPL-MCNC: 0.3 MG/DL (ref 0.1–1)
BLD GP AB SCN CELLS X3 SERPL QL: NORMAL
BLD PROD TYP BPU: NORMAL
BLOOD UNIT EXPIRATION DATE: NORMAL
BLOOD UNIT TYPE CODE: 5100
BLOOD UNIT TYPE: NORMAL
BUN SERPL-MCNC: 13 MG/DL (ref 6–20)
CALCIUM SERPL-MCNC: 8.9 MG/DL (ref 8.7–10.5)
CHLORIDE SERPL-SCNC: 108 MMOL/L (ref 95–110)
CO2 SERPL-SCNC: 20 MMOL/L (ref 23–29)
CODING SYSTEM: NORMAL
CREAT SERPL-MCNC: 0.8 MG/DL (ref 0.5–1.4)
DISPENSE STATUS: NORMAL
EST. GFR  (AFRICAN AMERICAN): >60 ML/MIN/1.73 M^2
EST. GFR  (NON AFRICAN AMERICAN): >60 ML/MIN/1.73 M^2
GLUCOSE SERPL-MCNC: 109 MG/DL (ref 70–110)
POTASSIUM SERPL-SCNC: 3.8 MMOL/L (ref 3.5–5.1)
PROT SERPL-MCNC: 7.7 G/DL (ref 6–8.4)
SODIUM SERPL-SCNC: 140 MMOL/L (ref 136–145)
TRANS ERYTHROCYTES VOL PATIENT: NORMAL ML

## 2021-07-25 PROCEDURE — 36430 TRANSFUSION BLD/BLD COMPNT: CPT

## 2021-07-25 PROCEDURE — P9021 RED BLOOD CELLS UNIT: HCPCS | Performed by: EMERGENCY MEDICINE

## 2021-07-25 RX ORDER — FERROUS SULFATE 325(65) MG
325 TABLET, DELAYED RELEASE (ENTERIC COATED) ORAL 2 TIMES DAILY
Qty: 60 TABLET | Refills: 0 | Status: SHIPPED | OUTPATIENT
Start: 2021-07-25

## 2021-07-25 RX ORDER — HYDROCODONE BITARTRATE AND ACETAMINOPHEN 500; 5 MG/1; MG/1
TABLET ORAL
Status: DISCONTINUED | OUTPATIENT
Start: 2021-07-25 | End: 2021-07-25 | Stop reason: HOSPADM

## 2021-10-05 ENCOUNTER — TELEPHONE (OUTPATIENT)
Dept: SMOKING CESSATION | Facility: CLINIC | Age: 46
End: 2021-10-05

## 2021-10-27 ENCOUNTER — HOSPITAL ENCOUNTER (OUTPATIENT)
Facility: HOSPITAL | Age: 46
Discharge: HOME | End: 2021-10-28
Attending: EMERGENCY MEDICINE | Admitting: PSYCHIATRY & NEUROLOGY
Payer: MEDICAID

## 2021-10-27 DIAGNOSIS — R20.2 TINGLING: ICD-10-CM

## 2021-10-27 DIAGNOSIS — G45.9 TIA (TRANSIENT ISCHEMIC ATTACK): Primary | ICD-10-CM

## 2021-10-27 DIAGNOSIS — D50.9 IRON DEFICIENCY ANEMIA, UNSPECIFIED IRON DEFICIENCY ANEMIA TYPE: ICD-10-CM

## 2021-10-27 DIAGNOSIS — R29.90 EPISODE OF TRANSIENT NEUROLOGIC SYMPTOMS: ICD-10-CM

## 2021-10-27 LAB
ABO + RH BLD: NORMAL
ALBUMIN SERPL BCP-MCNC: 3.5 G/DL (ref 3.5–5.2)
ALP SERPL-CCNC: 79 U/L (ref 55–135)
ALT SERPL W/O P-5'-P-CCNC: 14 U/L (ref 10–44)
ANION GAP SERPL CALC-SCNC: 11 MMOL/L (ref 8–16)
APTT BLDCRRT: <21 SEC (ref 21–32)
ASCENDING AORTA: 2.88 CM
AST SERPL-CCNC: 18 U/L (ref 10–40)
AV INDEX (PROSTH): 0.81
AV MEAN GRADIENT: 4 MMHG
AV PEAK GRADIENT: 9 MMHG
AV VALVE AREA: 2.5 CM2
AV VELOCITY RATIO: 0.7
B-HCG UR QL: NEGATIVE
BASOPHILS # BLD AUTO: 0.03 K/UL (ref 0–0.2)
BASOPHILS NFR BLD: 0.3 % (ref 0–1.9)
BILIRUB SERPL-MCNC: 0.2 MG/DL (ref 0.1–1)
BLD GP AB SCN CELLS X3 SERPL QL: NORMAL
BLD PROD TYP BPU: NORMAL
BLOOD UNIT EXPIRATION DATE: NORMAL
BLOOD UNIT TYPE CODE: 5100
BLOOD UNIT TYPE: NORMAL
BSA FOR ECHO PROCEDURE: 1.9 M2
BUN SERPL-MCNC: 14 MG/DL (ref 6–20)
CALCIUM SERPL-MCNC: 9.4 MG/DL (ref 8.7–10.5)
CHLORIDE SERPL-SCNC: 105 MMOL/L (ref 95–110)
CHOLEST SERPL-MCNC: 157 MG/DL (ref 120–199)
CHOLEST/HDLC SERPL: 4.6 {RATIO} (ref 2–5)
CK MB SERPL-MCNC: <1 NG/ML (ref 0.1–6.5)
CK MB SERPL-RTO: NORMAL % (ref 0–5)
CK SERPL-CCNC: 70 U/L (ref 20–180)
CO2 SERPL-SCNC: 22 MMOL/L (ref 23–29)
CODING SYSTEM: NORMAL
CREAT SERPL-MCNC: 0.8 MG/DL (ref 0.5–1.4)
CRP SERPL-MCNC: 1.6 MG/L (ref 0–8.2)
CTP QC/QA: YES
CTP QC/QA: YES
CV ECHO LV RWT: 0.32 CM
DIFFERENTIAL METHOD: ABNORMAL
DISPENSE STATUS: NORMAL
DOP CALC AO PEAK VEL: 1.46 M/S
DOP CALC AO VTI: 29.56 CM
DOP CALC LVOT AREA: 3.1 CM2
DOP CALC LVOT DIAMETER: 1.99 CM
DOP CALC LVOT PEAK VEL: 1.02 M/S
DOP CALC LVOT STROKE VOLUME: 73.99 CM3
DOP CALCLVOT PEAK VEL VTI: 23.8 CM
E WAVE DECELERATION TIME: 213.97 MSEC
E/A RATIO: 2
E/E' RATIO: 8.17 M/S
ECHO LV POSTERIOR WALL: 0.8 CM (ref 0.6–1.1)
EJECTION FRACTION: 60 %
EOSINOPHIL # BLD AUTO: 0.1 K/UL (ref 0–0.5)
EOSINOPHIL NFR BLD: 0.7 % (ref 0–8)
ERYTHROCYTE [DISTWIDTH] IN BLOOD BY AUTOMATED COUNT: 23.9 % (ref 11.5–14.5)
ERYTHROCYTE [SEDIMENTATION RATE] IN BLOOD BY WESTERGREN METHOD: 93 MM/HR (ref 0–36)
EST. GFR  (AFRICAN AMERICAN): >60 ML/MIN/1.73 M^2
EST. GFR  (NON AFRICAN AMERICAN): >60 ML/MIN/1.73 M^2
ESTIMATED AVG GLUCOSE: 114 MG/DL (ref 68–131)
FERRITIN SERPL-MCNC: 4 NG/ML (ref 20–300)
FRACTIONAL SHORTENING: 32 % (ref 28–44)
GLUCOSE SERPL-MCNC: 115 MG/DL (ref 70–110)
HBA1C MFR BLD: 5.6 % (ref 4–5.6)
HCT VFR BLD AUTO: 23.8 % (ref 37–48.5)
HDLC SERPL-MCNC: 34 MG/DL (ref 40–75)
HDLC SERPL: 21.7 % (ref 20–50)
HGB BLD-MCNC: 6.4 G/DL (ref 12–16)
IMM GRANULOCYTES # BLD AUTO: 0.04 K/UL (ref 0–0.04)
IMM GRANULOCYTES NFR BLD AUTO: 0.3 % (ref 0–0.5)
INR PPP: 0.9 (ref 0.8–1.2)
INTERVENTRICULAR SEPTUM: 0.7 CM (ref 0.6–1.1)
IRON SERPL-MCNC: 15 UG/DL (ref 30–160)
IVRT: 87.54 MSEC
LA MAJOR: 5.26 CM
LA MINOR: 5.13 CM
LA WIDTH: 5 CM
LDLC SERPL CALC-MCNC: 90.6 MG/DL (ref 63–159)
LEFT ATRIUM SIZE: 4 CM
LEFT ATRIUM VOLUME INDEX MOD: 59.7 ML/M2
LEFT ATRIUM VOLUME INDEX: 47.7 ML/M2
LEFT ATRIUM VOLUME MOD: 110.38 CM3
LEFT ATRIUM VOLUME: 88.3 CM3
LEFT INTERNAL DIMENSION IN SYSTOLE: 3.37 CM (ref 2.1–4)
LEFT VENTRICLE DIASTOLIC VOLUME INDEX: 63.41 ML/M2
LEFT VENTRICLE DIASTOLIC VOLUME: 117.31 ML
LEFT VENTRICLE MASS INDEX: 67 G/M2
LEFT VENTRICLE SYSTOLIC VOLUME INDEX: 25.2 ML/M2
LEFT VENTRICLE SYSTOLIC VOLUME: 46.56 ML
LEFT VENTRICULAR INTERNAL DIMENSION IN DIASTOLE: 4.98 CM (ref 3.5–6)
LEFT VENTRICULAR MASS: 124.23 G
LV LATERAL E/E' RATIO: 7.23 M/S
LV SEPTAL E/E' RATIO: 9.4 M/S
LYMPHOCYTES # BLD AUTO: 1.8 K/UL (ref 1–4.8)
LYMPHOCYTES NFR BLD: 15.4 % (ref 18–48)
MCH RBC QN AUTO: 17.9 PG (ref 27–31)
MCHC RBC AUTO-ENTMCNC: 26.9 G/DL (ref 32–36)
MCV RBC AUTO: 67 FL (ref 82–98)
MONOCYTES # BLD AUTO: 0.7 K/UL (ref 0.3–1)
MONOCYTES NFR BLD: 5.6 % (ref 4–15)
MV A" WAVE DURATION": 14.27 MSEC
MV PEAK A VEL: 0.47 M/S
MV PEAK E VEL: 0.94 M/S
MV STENOSIS PRESSURE HALF TIME: 62.05 MS
MV VALVE AREA P 1/2 METHOD: 3.55 CM2
NEUTROPHILS # BLD AUTO: 9 K/UL (ref 1.8–7.7)
NEUTROPHILS NFR BLD: 77.7 % (ref 38–73)
NONHDLC SERPL-MCNC: 123 MG/DL
NRBC BLD-RTO: 0 /100 WBC
NUM UNITS TRANS PACKED RBC: NORMAL
PISA TR MAX VEL: 2.56 M/S
PLATELET # BLD AUTO: 161 K/UL (ref 150–450)
PMV BLD AUTO: ABNORMAL FL (ref 9.2–12.9)
POCT GLUCOSE: 115 MG/DL (ref 70–110)
POTASSIUM SERPL-SCNC: 3.5 MMOL/L (ref 3.5–5.1)
PROT SERPL-MCNC: 7.2 G/DL (ref 6–8.4)
PROTHROMBIN TIME: 10.3 SEC (ref 9–12.5)
PULM VEIN S/D RATIO: 1.43
PV PEAK D VEL: 0.47 M/S
PV PEAK S VEL: 0.67 M/S
RA MAJOR: 5.04 CM
RA PRESSURE: 3 MMHG
RA WIDTH: 3.52 CM
RBC # BLD AUTO: 3.57 M/UL (ref 4–5.4)
RIGHT VENTRICULAR END-DIASTOLIC DIMENSION: 3.25 CM
RV TISSUE DOPPLER FREE WALL SYSTOLIC VELOCITY 1 (APICAL 4 CHAMBER VIEW): 12.18 CM/S
SARS-COV-2 RDRP RESP QL NAA+PROBE: NEGATIVE
SATURATED IRON: 4 % (ref 20–50)
SINUS: 2.83 CM
SODIUM SERPL-SCNC: 138 MMOL/L (ref 136–145)
STJ: 2.54 CM
T4 FREE SERPL-MCNC: 0.74 NG/DL (ref 0.71–1.51)
TDI LATERAL: 0.13 M/S
TDI SEPTAL: 0.1 M/S
TDI: 0.12 M/S
TOTAL IRON BINDING CAPACITY: 394 UG/DL (ref 250–450)
TR MAX PG: 26 MMHG
TRANSFERRIN SERPL-MCNC: 266 MG/DL (ref 200–375)
TRANSFERRIN SERPL-MCNC: 266 MG/DL (ref 200–375)
TRICUSPID ANNULAR PLANE SYSTOLIC EXCURSION: 2.34 CM
TRIGL SERPL-MCNC: 162 MG/DL (ref 30–150)
TROPONIN I SERPL DL<=0.01 NG/ML-MCNC: <0.006 NG/ML (ref 0–0.03)
TSH SERPL DL<=0.005 MIU/L-ACNC: 5.09 UIU/ML (ref 0.4–4)
TV REST PULMONARY ARTERY PRESSURE: 29 MMHG
WBC # BLD AUTO: 11.54 K/UL (ref 3.9–12.7)

## 2021-10-27 PROCEDURE — 25500020 PHARM REV CODE 255: Performed by: EMERGENCY MEDICINE

## 2021-10-27 PROCEDURE — 80061 LIPID PANEL: CPT | Performed by: NURSE PRACTITIONER

## 2021-10-27 PROCEDURE — 99204 OFFICE O/P NEW MOD 45 MIN: CPT | Mod: ,,, | Performed by: INTERNAL MEDICINE

## 2021-10-27 PROCEDURE — 86900 BLOOD TYPING SEROLOGIC ABO: CPT

## 2021-10-27 PROCEDURE — G0378 HOSPITAL OBSERVATION PER HR: HCPCS

## 2021-10-27 PROCEDURE — A9585 GADOBUTROL INJECTION: HCPCS | Performed by: EMERGENCY MEDICINE

## 2021-10-27 PROCEDURE — 99219 PR INITIAL OBSERVATION CARE,LEVL II: ICD-10-PCS | Mod: ,,, | Performed by: PSYCHIATRY & NEUROLOGY

## 2021-10-27 PROCEDURE — 99219 PR INITIAL OBSERVATION CARE,LEVL II: CPT | Mod: ,,, | Performed by: PSYCHIATRY & NEUROLOGY

## 2021-10-27 PROCEDURE — U0002 COVID-19 LAB TEST NON-CDC: HCPCS

## 2021-10-27 PROCEDURE — 93010 EKG 12-LEAD: ICD-10-PCS | Mod: ,,, | Performed by: INTERNAL MEDICINE

## 2021-10-27 PROCEDURE — 85025 COMPLETE CBC W/AUTO DIFF WBC: CPT

## 2021-10-27 PROCEDURE — 99285 EMERGENCY DEPT VISIT HI MDM: CPT | Mod: CS,,, | Performed by: EMERGENCY MEDICINE

## 2021-10-27 PROCEDURE — 86920 COMPATIBILITY TEST SPIN: CPT

## 2021-10-27 PROCEDURE — 84439 ASSAY OF FREE THYROXINE: CPT | Performed by: NURSE PRACTITIONER

## 2021-10-27 PROCEDURE — 81025 URINE PREGNANCY TEST: CPT

## 2021-10-27 PROCEDURE — 85730 THROMBOPLASTIN TIME PARTIAL: CPT | Performed by: NURSE PRACTITIONER

## 2021-10-27 PROCEDURE — 85652 RBC SED RATE AUTOMATED: CPT

## 2021-10-27 PROCEDURE — 83036 HEMOGLOBIN GLYCOSYLATED A1C: CPT | Performed by: NURSE PRACTITIONER

## 2021-10-27 PROCEDURE — 80053 COMPREHEN METABOLIC PANEL: CPT

## 2021-10-27 PROCEDURE — P9016 RBC LEUKOCYTES REDUCED: HCPCS

## 2021-10-27 PROCEDURE — 99285 PR EMERGENCY DEPT VISIT,LEVEL V: ICD-10-PCS | Mod: CS,,, | Performed by: EMERGENCY MEDICINE

## 2021-10-27 PROCEDURE — 84443 ASSAY THYROID STIM HORMONE: CPT | Performed by: NURSE PRACTITIONER

## 2021-10-27 PROCEDURE — 99204 PR OFFICE/OUTPT VISIT, NEW, LEVL IV, 45-59 MIN: ICD-10-PCS | Mod: ,,, | Performed by: INTERNAL MEDICINE

## 2021-10-27 PROCEDURE — 82962 GLUCOSE BLOOD TEST: CPT

## 2021-10-27 PROCEDURE — 82728 ASSAY OF FERRITIN: CPT | Performed by: STUDENT IN AN ORGANIZED HEALTH CARE EDUCATION/TRAINING PROGRAM

## 2021-10-27 PROCEDURE — 86140 C-REACTIVE PROTEIN: CPT

## 2021-10-27 PROCEDURE — 93010 ELECTROCARDIOGRAM REPORT: CPT | Mod: ,,, | Performed by: INTERNAL MEDICINE

## 2021-10-27 PROCEDURE — 85610 PROTHROMBIN TIME: CPT | Performed by: NURSE PRACTITIONER

## 2021-10-27 PROCEDURE — 93005 ELECTROCARDIOGRAM TRACING: CPT

## 2021-10-27 PROCEDURE — 84466 ASSAY OF TRANSFERRIN: CPT | Performed by: STUDENT IN AN ORGANIZED HEALTH CARE EDUCATION/TRAINING PROGRAM

## 2021-10-27 PROCEDURE — 82553 CREATINE MB FRACTION: CPT | Performed by: NURSE PRACTITIONER

## 2021-10-27 PROCEDURE — 25000003 PHARM REV CODE 250: Performed by: NURSE PRACTITIONER

## 2021-10-27 PROCEDURE — S4991 NICOTINE PATCH NONLEGEND: HCPCS | Performed by: NURSE PRACTITIONER

## 2021-10-27 PROCEDURE — 99285 EMERGENCY DEPT VISIT HI MDM: CPT | Mod: 25

## 2021-10-27 PROCEDURE — 84484 ASSAY OF TROPONIN QUANT: CPT | Performed by: NURSE PRACTITIONER

## 2021-10-27 PROCEDURE — 25000003 PHARM REV CODE 250

## 2021-10-27 RX ORDER — HYDROCODONE BITARTRATE AND ACETAMINOPHEN 500; 5 MG/1; MG/1
TABLET ORAL
Status: DISCONTINUED | OUTPATIENT
Start: 2021-10-27 | End: 2021-10-28 | Stop reason: HOSPADM

## 2021-10-27 RX ORDER — ASPIRIN 81 MG/1
81 TABLET ORAL DAILY
Status: DISCONTINUED | OUTPATIENT
Start: 2021-10-27 | End: 2021-10-28 | Stop reason: HOSPADM

## 2021-10-27 RX ORDER — LANOLIN ALCOHOL/MO/W.PET/CERES
1 CREAM (GRAM) TOPICAL DAILY
Status: DISCONTINUED | OUTPATIENT
Start: 2021-10-27 | End: 2021-10-28 | Stop reason: HOSPADM

## 2021-10-27 RX ORDER — CLOPIDOGREL BISULFATE 75 MG/1
300 TABLET ORAL ONCE
Status: COMPLETED | OUTPATIENT
Start: 2021-10-27 | End: 2021-10-27

## 2021-10-27 RX ORDER — IBUPROFEN 200 MG
1 TABLET ORAL DAILY
Status: DISCONTINUED | OUTPATIENT
Start: 2021-10-27 | End: 2021-10-28 | Stop reason: HOSPADM

## 2021-10-27 RX ORDER — SODIUM CHLORIDE 0.9 % (FLUSH) 0.9 %
10 SYRINGE (ML) INJECTION
Status: DISCONTINUED | OUTPATIENT
Start: 2021-10-27 | End: 2021-10-28 | Stop reason: HOSPADM

## 2021-10-27 RX ORDER — ASPIRIN 325 MG
325 TABLET ORAL
Status: COMPLETED | OUTPATIENT
Start: 2021-10-27 | End: 2021-10-27

## 2021-10-27 RX ORDER — ACETAMINOPHEN 325 MG/1
650 TABLET ORAL EVERY 6 HOURS PRN
Status: DISCONTINUED | OUTPATIENT
Start: 2021-10-27 | End: 2021-10-28 | Stop reason: HOSPADM

## 2021-10-27 RX ORDER — LABETALOL HYDROCHLORIDE 5 MG/ML
10 INJECTION, SOLUTION INTRAVENOUS EVERY 6 HOURS PRN
Status: DISCONTINUED | OUTPATIENT
Start: 2021-10-27 | End: 2021-10-28 | Stop reason: HOSPADM

## 2021-10-27 RX ORDER — GADOBUTROL 604.72 MG/ML
9 INJECTION INTRAVENOUS
Status: COMPLETED | OUTPATIENT
Start: 2021-10-27 | End: 2021-10-27

## 2021-10-27 RX ORDER — ATORVASTATIN CALCIUM 20 MG/1
40 TABLET, FILM COATED ORAL DAILY
Status: DISCONTINUED | OUTPATIENT
Start: 2021-10-27 | End: 2021-10-28 | Stop reason: HOSPADM

## 2021-10-27 RX ADMIN — ATORVASTATIN CALCIUM 40 MG: 20 TABLET, FILM COATED ORAL at 09:10

## 2021-10-27 RX ADMIN — GADOBUTROL 9 ML: 604.72 INJECTION INTRAVENOUS at 10:10

## 2021-10-27 RX ADMIN — IOHEXOL 100 ML: 350 INJECTION, SOLUTION INTRAVENOUS at 07:10

## 2021-10-27 RX ADMIN — FERROUS SULFATE TAB 325 MG (65 MG ELEMENTAL FE) 1 EACH: 325 (65 FE) TAB at 09:10

## 2021-10-27 RX ADMIN — ASPIRIN 81 MG: 81 TABLET, COATED ORAL at 09:10

## 2021-10-27 RX ADMIN — ASPIRIN 325 MG ORAL TABLET 325 MG: 325 PILL ORAL at 03:10

## 2021-10-27 RX ADMIN — Medication 1 PATCH: at 09:10

## 2021-10-27 RX ADMIN — CLOPIDOGREL 300 MG: 75 TABLET, FILM COATED ORAL at 03:10

## 2021-10-28 VITALS
DIASTOLIC BLOOD PRESSURE: 81 MMHG | RESPIRATION RATE: 16 BRPM | HEIGHT: 63 IN | HEART RATE: 89 BPM | TEMPERATURE: 99 F | BODY MASS INDEX: 31.87 KG/M2 | SYSTOLIC BLOOD PRESSURE: 121 MMHG | WEIGHT: 179.88 LBS | OXYGEN SATURATION: 98 %

## 2021-10-28 LAB
ALBUMIN SERPL BCP-MCNC: 3.3 G/DL (ref 3.5–5.2)
ALP SERPL-CCNC: 77 U/L (ref 55–135)
ALT SERPL W/O P-5'-P-CCNC: 14 U/L (ref 10–44)
ANION GAP SERPL CALC-SCNC: 9 MMOL/L (ref 8–16)
AST SERPL-CCNC: 22 U/L (ref 10–40)
BASOPHILS # BLD AUTO: 0.03 K/UL (ref 0–0.2)
BASOPHILS NFR BLD: 0.4 % (ref 0–1.9)
BILIRUB SERPL-MCNC: 0.5 MG/DL (ref 0.1–1)
BUN SERPL-MCNC: 11 MG/DL (ref 6–20)
CALCIUM SERPL-MCNC: 9.6 MG/DL (ref 8.7–10.5)
CHLORIDE SERPL-SCNC: 106 MMOL/L (ref 95–110)
CO2 SERPL-SCNC: 23 MMOL/L (ref 23–29)
CREAT SERPL-MCNC: 0.8 MG/DL (ref 0.5–1.4)
DIFFERENTIAL METHOD: ABNORMAL
EOSINOPHIL # BLD AUTO: 0.1 K/UL (ref 0–0.5)
EOSINOPHIL NFR BLD: 1.6 % (ref 0–8)
ERYTHROCYTE [DISTWIDTH] IN BLOOD BY AUTOMATED COUNT: 25 % (ref 11.5–14.5)
EST. GFR  (AFRICAN AMERICAN): >60 ML/MIN/1.73 M^2
EST. GFR  (NON AFRICAN AMERICAN): >60 ML/MIN/1.73 M^2
GLUCOSE SERPL-MCNC: 118 MG/DL (ref 70–110)
HCT VFR BLD AUTO: 25.7 % (ref 37–48.5)
HCYS SERPL-SCNC: 9.9 UMOL/L (ref 4–15.5)
HGB BLD-MCNC: 7.6 G/DL (ref 12–16)
IMM GRANULOCYTES # BLD AUTO: 0.02 K/UL (ref 0–0.04)
IMM GRANULOCYTES NFR BLD AUTO: 0.3 % (ref 0–0.5)
LYMPHOCYTES # BLD AUTO: 2.1 K/UL (ref 1–4.8)
LYMPHOCYTES NFR BLD: 28.3 % (ref 18–48)
MAGNESIUM SERPL-MCNC: 1.8 MG/DL (ref 1.6–2.6)
MCH RBC QN AUTO: 19.8 PG (ref 27–31)
MCHC RBC AUTO-ENTMCNC: 29.6 G/DL (ref 32–36)
MCV RBC AUTO: 67 FL (ref 82–98)
MONOCYTES # BLD AUTO: 0.6 K/UL (ref 0.3–1)
MONOCYTES NFR BLD: 7.5 % (ref 4–15)
NEUTROPHILS # BLD AUTO: 4.5 K/UL (ref 1.8–7.7)
NEUTROPHILS NFR BLD: 61.9 % (ref 38–73)
NRBC BLD-RTO: 0 /100 WBC
PHOSPHATE SERPL-MCNC: 3.9 MG/DL (ref 2.7–4.5)
PLATELET # BLD AUTO: 159 K/UL (ref 150–450)
PMV BLD AUTO: ABNORMAL FL (ref 9.2–12.9)
POTASSIUM SERPL-SCNC: 3.8 MMOL/L (ref 3.5–5.1)
PROT SERPL-MCNC: 6.8 G/DL (ref 6–8.4)
RBC # BLD AUTO: 3.84 M/UL (ref 4–5.4)
SODIUM SERPL-SCNC: 138 MMOL/L (ref 136–145)
WBC # BLD AUTO: 7.31 K/UL (ref 3.9–12.7)

## 2021-10-28 PROCEDURE — 99219 PR INITIAL OBSERVATION CARE,LEVL II: ICD-10-PCS | Mod: ,,, | Performed by: PSYCHIATRY & NEUROLOGY

## 2021-10-28 PROCEDURE — 99219 PR INITIAL OBSERVATION CARE,LEVL II: CPT | Mod: ,,, | Performed by: PSYCHIATRY & NEUROLOGY

## 2021-10-28 PROCEDURE — G0378 HOSPITAL OBSERVATION PER HR: HCPCS

## 2021-10-28 PROCEDURE — 85025 COMPLETE CBC W/AUTO DIFF WBC: CPT | Performed by: NURSE PRACTITIONER

## 2021-10-28 PROCEDURE — 92523 SPEECH SOUND LANG COMPREHEN: CPT

## 2021-10-28 PROCEDURE — 36415 COLL VENOUS BLD VENIPUNCTURE: CPT | Performed by: NURSE PRACTITIONER

## 2021-10-28 PROCEDURE — 84100 ASSAY OF PHOSPHORUS: CPT | Performed by: NURSE PRACTITIONER

## 2021-10-28 PROCEDURE — 92610 EVALUATE SWALLOWING FUNCTION: CPT

## 2021-10-28 PROCEDURE — 36415 COLL VENOUS BLD VENIPUNCTURE: CPT | Performed by: STUDENT IN AN ORGANIZED HEALTH CARE EDUCATION/TRAINING PROGRAM

## 2021-10-28 PROCEDURE — 25000003 PHARM REV CODE 250: Performed by: NURSE PRACTITIONER

## 2021-10-28 PROCEDURE — 81241 F5 GENE: CPT | Performed by: STUDENT IN AN ORGANIZED HEALTH CARE EDUCATION/TRAINING PROGRAM

## 2021-10-28 PROCEDURE — 83735 ASSAY OF MAGNESIUM: CPT | Performed by: NURSE PRACTITIONER

## 2021-10-28 PROCEDURE — 86147 CARDIOLIPIN ANTIBODY EA IG: CPT | Performed by: STUDENT IN AN ORGANIZED HEALTH CARE EDUCATION/TRAINING PROGRAM

## 2021-10-28 PROCEDURE — 83090 ASSAY OF HOMOCYSTEINE: CPT | Performed by: STUDENT IN AN ORGANIZED HEALTH CARE EDUCATION/TRAINING PROGRAM

## 2021-10-28 PROCEDURE — 80053 COMPREHEN METABOLIC PANEL: CPT | Performed by: NURSE PRACTITIONER

## 2021-10-28 PROCEDURE — S4991 NICOTINE PATCH NONLEGEND: HCPCS | Performed by: NURSE PRACTITIONER

## 2021-10-28 RX ADMIN — ASPIRIN 81 MG: 81 TABLET, COATED ORAL at 08:10

## 2021-10-28 RX ADMIN — Medication 1 PATCH: at 08:10

## 2021-10-28 RX ADMIN — FERROUS SULFATE TAB 325 MG (65 MG ELEMENTAL FE) 1 EACH: 325 (65 FE) TAB at 08:10

## 2021-10-28 RX ADMIN — ATORVASTATIN CALCIUM 40 MG: 20 TABLET, FILM COATED ORAL at 08:10

## 2021-10-29 ENCOUNTER — TELEPHONE (OUTPATIENT)
Dept: NEUROLOGY | Facility: CLINIC | Age: 46
End: 2021-10-29
Payer: MEDICAID

## 2021-11-01 LAB
CARDIOLIPIN IGG SER IA-ACNC: <9.4 GPL (ref 0–14.99)
CARDIOLIPIN IGM SER IA-ACNC: <9.4 MPL (ref 0–12.49)

## 2021-11-02 ENCOUNTER — TELEPHONE (OUTPATIENT)
Dept: SMOKING CESSATION | Facility: CLINIC | Age: 46
End: 2021-11-02
Payer: MEDICAID

## 2021-11-02 LAB
F5 GENE MUT ANL BLD/T: NORMAL
F5 P.R506Q BLD/T QL: NEGATIVE

## 2021-11-10 ENCOUNTER — TELEPHONE (OUTPATIENT)
Dept: SMOKING CESSATION | Facility: CLINIC | Age: 46
End: 2021-11-10
Payer: MEDICAID

## 2021-12-01 ENCOUNTER — TELEPHONE (OUTPATIENT)
Dept: REHABILITATION | Facility: HOSPITAL | Age: 46
End: 2021-12-01
Payer: MEDICAID

## 2021-12-01 ENCOUNTER — DOCUMENTATION ONLY (OUTPATIENT)
Dept: REHABILITATION | Facility: HOSPITAL | Age: 46
End: 2021-12-01
Payer: MEDICAID

## 2021-12-02 ENCOUNTER — OFFICE VISIT (OUTPATIENT)
Dept: NEUROLOGY | Facility: CLINIC | Age: 46
End: 2021-12-02
Payer: MEDICAID

## 2021-12-02 VITALS
WEIGHT: 170.19 LBS | HEIGHT: 63 IN | HEART RATE: 92 BPM | SYSTOLIC BLOOD PRESSURE: 132 MMHG | DIASTOLIC BLOOD PRESSURE: 81 MMHG | BODY MASS INDEX: 30.16 KG/M2

## 2021-12-02 DIAGNOSIS — E78.2 MIXED HYPERLIPIDEMIA: ICD-10-CM

## 2021-12-02 DIAGNOSIS — F17.200 TOBACCO USE DISORDER: ICD-10-CM

## 2021-12-02 DIAGNOSIS — I63.49 CEREBROVASCULAR ACCIDENT (CVA) DUE TO EMBOLISM OF OTHER CEREBRAL ARTERY: Primary | ICD-10-CM

## 2021-12-02 DIAGNOSIS — I51.7 LEFT ATRIAL ENLARGEMENT: ICD-10-CM

## 2021-12-02 DIAGNOSIS — G45.9 TIA (TRANSIENT ISCHEMIC ATTACK): ICD-10-CM

## 2021-12-02 PROCEDURE — 99214 OFFICE O/P EST MOD 30 MIN: CPT | Mod: S$PBB,,, | Performed by: PSYCHIATRY & NEUROLOGY

## 2021-12-02 PROCEDURE — 99214 PR OFFICE/OUTPT VISIT, EST, LEVL IV, 30-39 MIN: ICD-10-PCS | Mod: S$PBB,,, | Performed by: PSYCHIATRY & NEUROLOGY

## 2021-12-02 PROCEDURE — 99999 PR PBB SHADOW E&M-EST. PATIENT-LVL III: ICD-10-PCS | Mod: PBBFAC,,, | Performed by: STUDENT IN AN ORGANIZED HEALTH CARE EDUCATION/TRAINING PROGRAM

## 2021-12-02 PROCEDURE — 99999 PR PBB SHADOW E&M-EST. PATIENT-LVL III: CPT | Mod: PBBFAC,,, | Performed by: STUDENT IN AN ORGANIZED HEALTH CARE EDUCATION/TRAINING PROGRAM

## 2021-12-02 PROCEDURE — 99213 OFFICE O/P EST LOW 20 MIN: CPT | Mod: PBBFAC | Performed by: STUDENT IN AN ORGANIZED HEALTH CARE EDUCATION/TRAINING PROGRAM

## 2021-12-02 RX ORDER — ATORVASTATIN CALCIUM 40 MG/1
40 TABLET, FILM COATED ORAL DAILY
Qty: 90 TABLET | Refills: 3 | Status: SHIPPED | OUTPATIENT
Start: 2021-12-02 | End: 2022-12-02

## 2021-12-02 RX ORDER — NAPROXEN SODIUM 220 MG/1
81 TABLET, FILM COATED ORAL DAILY
Qty: 30 TABLET | Refills: 11 | COMMUNITY
Start: 2021-12-02 | End: 2024-01-24

## 2021-12-06 PROBLEM — I63.9 CEREBROVASCULAR ACCIDENT (CVA) DUE TO EMBOLISM: Status: ACTIVE | Noted: 2021-12-06

## 2021-12-06 PROBLEM — I51.7 LEFT ATRIAL ENLARGEMENT: Status: ACTIVE | Noted: 2021-12-06

## 2021-12-06 PROBLEM — E78.2 MIXED HYPERLIPIDEMIA: Status: ACTIVE | Noted: 2021-12-06

## 2021-12-06 PROBLEM — G45.9 TIA (TRANSIENT ISCHEMIC ATTACK): Status: ACTIVE | Noted: 2021-12-06

## 2021-12-13 ENCOUNTER — CLINICAL SUPPORT (OUTPATIENT)
Dept: SMOKING CESSATION | Facility: CLINIC | Age: 46
End: 2021-12-13
Payer: COMMERCIAL

## 2021-12-13 DIAGNOSIS — F17.200 NICOTINE DEPENDENCE: Primary | ICD-10-CM

## 2021-12-13 PROCEDURE — 99404 PR PREVENT COUNSEL,INDIV,60 MIN: ICD-10-PCS | Mod: S$GLB,,,

## 2021-12-13 PROCEDURE — 99999 PR PBB SHADOW E&M-EST. PATIENT-LVL I: ICD-10-PCS | Mod: PBBFAC,,,

## 2021-12-13 PROCEDURE — 99404 PREV MED CNSL INDIV APPRX 60: CPT | Mod: S$GLB,,,

## 2021-12-13 PROCEDURE — 99999 PR PBB SHADOW E&M-EST. PATIENT-LVL I: CPT | Mod: PBBFAC,,,

## 2021-12-13 RX ORDER — IBUPROFEN 200 MG
1 TABLET ORAL DAILY
Qty: 14 PATCH | Refills: 0 | Status: SHIPPED | OUTPATIENT
Start: 2021-12-13 | End: 2023-02-07

## 2021-12-27 ENCOUNTER — CLINICAL SUPPORT (OUTPATIENT)
Dept: SMOKING CESSATION | Facility: CLINIC | Age: 46
End: 2021-12-27
Payer: COMMERCIAL

## 2021-12-27 DIAGNOSIS — F17.200 NICOTINE DEPENDENCE: Primary | ICD-10-CM

## 2021-12-27 PROCEDURE — 99999 PR PBB SHADOW E&M-EST. PATIENT-LVL I: CPT | Mod: PBBFAC,,,

## 2021-12-27 PROCEDURE — 99402 PREV MED CNSL INDIV APPRX 30: CPT | Mod: S$GLB,,,

## 2021-12-27 PROCEDURE — 99402 PR PREVENT COUNSEL,INDIV,30 MIN: ICD-10-PCS | Mod: S$GLB,,,

## 2021-12-27 PROCEDURE — 99999 PR PBB SHADOW E&M-EST. PATIENT-LVL I: ICD-10-PCS | Mod: PBBFAC,,,

## 2022-01-03 ENCOUNTER — TELEPHONE (OUTPATIENT)
Dept: SMOKING CESSATION | Facility: CLINIC | Age: 47
End: 2022-01-03
Payer: MEDICAID

## 2022-03-09 ENCOUNTER — CLINICAL SUPPORT (OUTPATIENT)
Dept: SMOKING CESSATION | Facility: CLINIC | Age: 47
End: 2022-03-09
Payer: COMMERCIAL

## 2022-03-09 DIAGNOSIS — F17.200 NICOTINE DEPENDENCE: Primary | ICD-10-CM

## 2022-03-09 PROCEDURE — 99407 BEHAV CHNG SMOKING > 10 MIN: CPT | Mod: S$GLB,,,

## 2022-03-09 PROCEDURE — 99407 PR TOBACCO USE CESSATION INTENSIVE >10 MINUTES: ICD-10-PCS | Mod: S$GLB,,,

## 2022-03-09 NOTE — PROGRESS NOTES
Spoke with patient today in regard to 3 month follow up for smoking cessation progress, she states not tobacco free. Patient not ready to return to the program at this time. Patient will call back when she is ready to join.  Informed patient of benefit period, future follow ups and contact information if any further help or support is needed. Will resolve episode and complete smart form for Quit attempt #1  And completed three month follow up on attempt #2. Sent patient a [employer] link so that she could establish virtual care appointments.

## 2022-05-31 ENCOUNTER — HOSPITAL ENCOUNTER (EMERGENCY)
Facility: HOSPITAL | Age: 47
Discharge: HOME | End: 2022-06-01
Attending: EMERGENCY MEDICINE
Payer: MEDICAID

## 2022-05-31 DIAGNOSIS — R10.9 ABDOMINAL CRAMPING: ICD-10-CM

## 2022-05-31 DIAGNOSIS — N93.9 VAGINAL BLEEDING: ICD-10-CM

## 2022-05-31 DIAGNOSIS — D50.0 IRON DEFICIENCY ANEMIA DUE TO CHRONIC BLOOD LOSS: Primary | ICD-10-CM

## 2022-05-31 LAB
ABO + RH BLD: NORMAL
ALBUMIN SERPL BCP-MCNC: 3.6 G/DL (ref 3.5–5.2)
ALP SERPL-CCNC: 68 U/L (ref 55–135)
ALT SERPL W/O P-5'-P-CCNC: 11 U/L (ref 10–44)
ANION GAP SERPL CALC-SCNC: 7 MMOL/L (ref 8–16)
ANISOCYTOSIS BLD QL SMEAR: ABNORMAL
AST SERPL-CCNC: 16 U/L (ref 10–40)
B-HCG UR QL: NEGATIVE
BASOPHILS # BLD AUTO: 0.02 K/UL (ref 0–0.2)
BASOPHILS NFR BLD: 0.3 % (ref 0–1.9)
BILIRUB SERPL-MCNC: 0.3 MG/DL (ref 0.1–1)
BLD GP AB SCN CELLS X3 SERPL QL: NORMAL
BLD PROD TYP BPU: NORMAL
BLD PROD TYP BPU: NORMAL
BLOOD UNIT EXPIRATION DATE: NORMAL
BLOOD UNIT EXPIRATION DATE: NORMAL
BLOOD UNIT TYPE CODE: 5100
BLOOD UNIT TYPE CODE: 5100
BLOOD UNIT TYPE: NORMAL
BLOOD UNIT TYPE: NORMAL
BUN SERPL-MCNC: 12 MG/DL (ref 6–20)
CALCIUM SERPL-MCNC: 8.9 MG/DL (ref 8.7–10.5)
CHLORIDE SERPL-SCNC: 106 MMOL/L (ref 95–110)
CO2 SERPL-SCNC: 23 MMOL/L (ref 23–29)
CODING SYSTEM: NORMAL
CODING SYSTEM: NORMAL
CREAT SERPL-MCNC: 0.8 MG/DL (ref 0.5–1.4)
CTP QC/QA: YES
DIFFERENTIAL METHOD: ABNORMAL
DISPENSE STATUS: NORMAL
DISPENSE STATUS: NORMAL
EOSINOPHIL # BLD AUTO: 0.1 K/UL (ref 0–0.5)
EOSINOPHIL NFR BLD: 1.2 % (ref 0–8)
ERYTHROCYTE [DISTWIDTH] IN BLOOD BY AUTOMATED COUNT: 25.4 % (ref 11.5–14.5)
EST. GFR  (AFRICAN AMERICAN): >60 ML/MIN/1.73 M^2
EST. GFR  (NON AFRICAN AMERICAN): >60 ML/MIN/1.73 M^2
GLUCOSE SERPL-MCNC: 95 MG/DL (ref 70–110)
HCT VFR BLD AUTO: 21.3 % (ref 37–48.5)
HGB BLD-MCNC: 5.8 G/DL (ref 12–16)
HYPOCHROMIA BLD QL SMEAR: ABNORMAL
IMM GRANULOCYTES # BLD AUTO: 0.02 K/UL (ref 0–0.04)
IMM GRANULOCYTES NFR BLD AUTO: 0.3 % (ref 0–0.5)
LIPASE SERPL-CCNC: 17 U/L (ref 4–60)
LYMPHOCYTES # BLD AUTO: 2.1 K/UL (ref 1–4.8)
LYMPHOCYTES NFR BLD: 34.9 % (ref 18–48)
MCH RBC QN AUTO: 17.1 PG (ref 27–31)
MCHC RBC AUTO-ENTMCNC: 27.2 G/DL (ref 32–36)
MCV RBC AUTO: 63 FL (ref 82–98)
MONOCYTES # BLD AUTO: 0.4 K/UL (ref 0.3–1)
MONOCYTES NFR BLD: 6.8 % (ref 4–15)
NEUTROPHILS # BLD AUTO: 3.4 K/UL (ref 1.8–7.7)
NEUTROPHILS NFR BLD: 56.5 % (ref 38–73)
NRBC BLD-RTO: 0 /100 WBC
OVALOCYTES BLD QL SMEAR: ABNORMAL
PLATELET # BLD AUTO: 185 K/UL (ref 150–450)
PLATELET BLD QL SMEAR: ABNORMAL
PMV BLD AUTO: ABNORMAL FL (ref 9.2–12.9)
POIKILOCYTOSIS BLD QL SMEAR: SLIGHT
POLYCHROMASIA BLD QL SMEAR: ABNORMAL
POTASSIUM SERPL-SCNC: 3.5 MMOL/L (ref 3.5–5.1)
PROT SERPL-MCNC: 7.4 G/DL (ref 6–8.4)
RBC # BLD AUTO: 3.4 M/UL (ref 4–5.4)
SCHISTOCYTES BLD QL SMEAR: ABNORMAL
SODIUM SERPL-SCNC: 136 MMOL/L (ref 136–145)
TARGETS BLD QL SMEAR: ABNORMAL
TRANS ERYTHROCYTES VOL PATIENT: NORMAL ML
TRANS ERYTHROCYTES VOL PATIENT: NORMAL ML
WBC # BLD AUTO: 6.01 K/UL (ref 3.9–12.7)

## 2022-05-31 PROCEDURE — 99285 EMERGENCY DEPT VISIT HI MDM: CPT | Mod: 25

## 2022-05-31 PROCEDURE — 86920 COMPATIBILITY TEST SPIN: CPT | Performed by: EMERGENCY MEDICINE

## 2022-05-31 PROCEDURE — 86803 HEPATITIS C AB TEST: CPT | Performed by: EMERGENCY MEDICINE

## 2022-05-31 PROCEDURE — 99291 CRITICAL CARE FIRST HOUR: CPT | Mod: 25

## 2022-05-31 PROCEDURE — 80053 COMPREHEN METABOLIC PANEL: CPT | Performed by: EMERGENCY MEDICINE

## 2022-05-31 PROCEDURE — 85025 COMPLETE CBC W/AUTO DIFF WBC: CPT | Performed by: EMERGENCY MEDICINE

## 2022-05-31 PROCEDURE — P9021 RED BLOOD CELLS UNIT: HCPCS | Performed by: EMERGENCY MEDICINE

## 2022-05-31 PROCEDURE — 81025 URINE PREGNANCY TEST: CPT | Performed by: EMERGENCY MEDICINE

## 2022-05-31 PROCEDURE — 99291 CRITICAL CARE FIRST HOUR: CPT | Mod: ,,, | Performed by: EMERGENCY MEDICINE

## 2022-05-31 PROCEDURE — 83690 ASSAY OF LIPASE: CPT | Performed by: EMERGENCY MEDICINE

## 2022-05-31 PROCEDURE — 99291 PR CRITICAL CARE, E/M 30-74 MINUTES: ICD-10-PCS | Mod: ,,, | Performed by: EMERGENCY MEDICINE

## 2022-05-31 PROCEDURE — 36430 TRANSFUSION BLD/BLD COMPNT: CPT

## 2022-05-31 PROCEDURE — 87389 HIV-1 AG W/HIV-1&-2 AB AG IA: CPT | Performed by: EMERGENCY MEDICINE

## 2022-05-31 PROCEDURE — 86850 RBC ANTIBODY SCREEN: CPT | Performed by: EMERGENCY MEDICINE

## 2022-05-31 PROCEDURE — 25000003 PHARM REV CODE 250: Performed by: NURSE PRACTITIONER

## 2022-05-31 RX ORDER — HYDROCODONE BITARTRATE AND ACETAMINOPHEN 500; 5 MG/1; MG/1
TABLET ORAL
Status: DISCONTINUED | OUTPATIENT
Start: 2022-05-31 | End: 2022-06-01 | Stop reason: HOSPADM

## 2022-05-31 RX ORDER — POLYETHYLENE GLYCOL 3350 17 G/17G
17 POWDER, FOR SOLUTION ORAL ONCE
Status: DISCONTINUED | OUTPATIENT
Start: 2022-05-31 | End: 2022-05-31

## 2022-05-31 RX ORDER — LANOLIN ALCOHOL/MO/W.PET/CERES
1 CREAM (GRAM) TOPICAL 2 TIMES DAILY
Refills: 0 | Status: DISCONTINUED | OUTPATIENT
Start: 2022-05-31 | End: 2022-06-01 | Stop reason: HOSPADM

## 2022-05-31 RX ADMIN — FERROUS SULFATE TAB 325 MG (65 MG ELEMENTAL FE) 1 EACH: 325 (65 FE) TAB at 10:05

## 2022-05-31 NOTE — ED PROVIDER NOTES
Encounter Date: 2022       History     Chief Complaint   Patient presents with    Dizziness     Every time I come my blood be low, just finishing heavy period     Pt is a 47 yo F with PMH of iron deficiency anemia, uterine fibroid, c section x 2, tobacco use who presents with concern for anemia. She has been dizzy with standing for the past 3 days. Present when standing while improved with rest. Feels lightheaded. Mildly short of breath. Her legs feel heavy bilaterally.  She reports she just had a heavy period. Used more pads than normal but no blood clots. She reports she has discussed treatment of her fibroid in the past but got lost to follow up  Denies fever, cough, congestion, sore throat, ear pain  She is not on any anti-coagulation or hormone therapy        The history is provided by the patient.     Review of patient's allergies indicates:  No Known Allergies  Past Medical History:   Diagnosis Date    Encounter for blood transfusion     4 per patient report (as of 2021)    Hx of ischemic right ITZ stroke 2017    Iron deficiency anemia     Tobacco use disorder 2021    Uterine fibroid      Past Surgical History:   Procedure Laterality Date     SECTION      x2    tubal ligation       Family History   Problem Relation Age of Onset    Anemia Mother     Stroke Mother     Throat cancer Father     Myasthenia gravis Child     Myasthenia gravis Child      Social History     Tobacco Use    Smoking status: Current Every Day Smoker     Packs/day: 1.00     Years: 20.00     Pack years: 20.00     Types: Cigarettes     Start date:     Smokeless tobacco: Never Used   Substance Use Topics    Alcohol use: Not Currently     Alcohol/week: 1.0 standard drink     Types: 1 Shots of liquor per week     Comment: Quit in     Drug use: No     Review of Systems  General: No fever.  No chills.  Eyes: No visual changes.  Head: No headache.    Integument: No rashes or lesions.  Chest: +  shortness of breath.  Cardiovascular: No chest pain.  Abdomen: No abdominal pain.  No nausea or vomiting.  Urinary: No abnormal urination.  Neurologic: No focal weakness.  No numbness.  Hematologic: No easy bruising.  Endocrine: No excessive thirst or urination.    Physical Exam     Initial Vitals [05/31/22 1642]   BP Pulse Resp Temp SpO2   135/76 77 18 98.7 °F (37.1 °C) 100 %      MAP       --         Physical Exam    Nursing note and vitals reviewed.  Constitutional: She appears well-developed and well-nourished. She is not diaphoretic. No distress.   HENT:   Head: Normocephalic and atraumatic.   Eyes: Conjunctivae and EOM are normal.   Neck: Neck supple.   Normal range of motion.  Cardiovascular: Normal rate and regular rhythm.   Pulmonary/Chest: No respiratory distress.   Abdominal: Abdomen is soft. She exhibits no distension. There is no abdominal tenderness.   Musculoskeletal:         General: Normal range of motion.      Cervical back: Normal range of motion and neck supple.     Neurological: She is alert and oriented to person, place, and time. GCS score is 15. GCS eye subscore is 4. GCS verbal subscore is 5. GCS motor subscore is 6.   Skin: Skin is warm and dry.   Psychiatric: She has a normal mood and affect. Her behavior is normal. Judgment and thought content normal.         ED Course   Procedures  Labs Reviewed   CBC W/ AUTO DIFFERENTIAL - Abnormal; Notable for the following components:       Result Value    RBC 3.40 (*)     Hemoglobin 5.8 (*)     Hematocrit 21.3 (*)     MCV 63 (*)     MCH 17.1 (*)     MCHC 27.2 (*)     RDW 25.4 (*)     All other components within normal limits    Narrative:     HGB   critical result(s) called and verbal readback obtained from   Lindsey Saravia RN by MÓNICA 05/31/2022 18:03   COMPREHENSIVE METABOLIC PANEL - Abnormal; Notable for the following components:    Anion Gap 7 (*)     All other components within normal limits   LIPASE   HIV 1 / 2 ANTIBODY   HEPATITIS C ANTIBODY    POCT URINE PREGNANCY   TYPE & SCREEN   PREPARE RBC SOFT   PREPARE RBC SOFT          Imaging Results          X-Ray Abdomen AP 1 View (KUB) (Final result)  Result time 05/31/22 18:21:06    Final result by Mathieu Wade MD (05/31/22 18:21:06)                 Impression:      Possible constipation.      Electronically signed by: Mathieu Wade  Date:    05/31/2022  Time:    18:21             Narrative:    EXAMINATION:  XR ABDOMEN AP 1 VIEW    CLINICAL HISTORY:  Unspecified abdominal pain    TECHNIQUE:  AP View(s) of the abdomen was performed.    COMPARISON:  None    FINDINGS:  No evidence of bowel obstruction.    No radiographic mass, organomegaly or pathologic calcification.  Mild retained feces in the colon.  No acute osseous abnormality.                                 Medications   0.9%  NaCl infusion (for blood administration) (has no administration in time range)   ferrous sulfate tablet 1 each (1 each Oral Given 5/31/22 2219)   dextrose 10% bolus 125 mL (has no administration in time range)   dextrose 10% bolus 250 mL (has no administration in time range)   0.9%  NaCl infusion (for blood administration) (has no administration in time range)     Medical Decision Making:   History:   Old Medical Records: I decided to obtain old medical records.  Old Records Summarized: records from clinic visits, records from previous admission(s) and records from another hospital.       <> Summary of Records: ECHO 10/27/21  · The left ventricle is normal in size with normal systolic function. The estimated ejection fraction is 60%.  · Normal left ventricular diastolic function.  · Normal right ventricular size with normal right ventricular systolic function.  · Moderate left atrial enlargement.  · Mild mitral regurgitation.  · Mild tricuspid regurgitation.  · The estimated PA systolic pressure is 29 mmHg.  · Normal central venous pressure (3 mmHg).       Differential Diagnosis:   Anemia, CHF, ACS, pneumonia, covid  Clinical  Tests:   Lab Tests: Ordered and Reviewed  Radiological Study: Ordered and Reviewed  ED Management:  She has no signs of active bleeding  Likely due to her chronic anemia and now heavy bleeding for the past 2 days, none currently  Patient overall well appearing  Was consented for a blood transfusion, she has had this before  As long as no complication, plan will be for discharge  Other:   I have discussed this case with another health care provider.       <> Summary of the Discussion: Discussed with case management hospital medicine her patient reports she does not want to be admitted  She is going to leave after her 2 units prbc's are transfused  Critical Care  Date: 06/01/2022  Performed by: Cassandra Parmar MD    Authorized by: Cassandra Parmar MD   Total critical care time (exclusive of procedural time) :35 minutes  Critical care was necessary to treat or prevent imminent or life-threatening deterioration of the following conditions:  Blood loss anemia                      Clinical Impression:   Final diagnoses:  [R10.9] Abdominal cramping  [N93.9] Vaginal bleeding  [D50.0] Iron deficiency anemia due to chronic blood loss (Primary)                 Cassandra Parmar MD  06/01/22 0019

## 2022-06-01 VITALS
RESPIRATION RATE: 16 BRPM | TEMPERATURE: 98 F | HEART RATE: 59 BPM | OXYGEN SATURATION: 100 % | WEIGHT: 160 LBS | DIASTOLIC BLOOD PRESSURE: 95 MMHG | HEIGHT: 63 IN | SYSTOLIC BLOOD PRESSURE: 148 MMHG | BODY MASS INDEX: 28.35 KG/M2

## 2022-06-01 LAB
HCV AB SERPL QL IA: NEGATIVE
HIV 1+2 AB+HIV1 P24 AG SERPL QL IA: NEGATIVE

## 2022-06-01 NOTE — DISCHARGE INSTRUCTIONS
Follow up with your ob-gyn. Continue taking iron daily.    Your were given 2 units of blood today.      Seek immediate medical attention if you have persistent bleeding, new or worsening symptoms, or for any other concern.

## 2022-06-01 NOTE — ED NOTES
Pt updated about POC, this RN explained the second unit of blood will be requested from blood bank shortly, verbalizes understanding. Will continue to monitor pt while in the ER.

## 2022-06-01 NOTE — ED NOTES
Assumed care of pt from Lindsey POTTER. Pt being evaluated for low H/H of 5.8/21.3-has PMHx of ALO. Per Lindsey RN, pt doesn't want to be admitted, pt to receive 2 units of blood and then go home. Pt consented for blood prior to this RN taking over. Pt currently AAOx4, VSS, NADN. Pt free from fall/injury, side rails up x2, bed in lowest/locked position, call light next to pt. Will continue to monitor pt while in the ER.

## 2022-07-01 ENCOUNTER — CLINICAL SUPPORT (OUTPATIENT)
Dept: SMOKING CESSATION | Facility: CLINIC | Age: 47
End: 2022-07-01
Payer: COMMERCIAL

## 2022-07-01 DIAGNOSIS — F17.200 NICOTINE DEPENDENCE: Primary | ICD-10-CM

## 2022-07-01 PROCEDURE — 99407 BEHAV CHNG SMOKING > 10 MIN: CPT | Mod: S$GLB,,,

## 2022-07-01 PROCEDURE — 99407 PR TOBACCO USE CESSATION INTENSIVE >10 MINUTES: ICD-10-PCS | Mod: S$GLB,,,

## 2022-07-01 NOTE — PROGRESS NOTES
Spoke with patient today in regard to smoking cessation progress for 6 month telephone follow up, she states not tobacco free. Patient states no interest in returning to the program at this time and will call when ready. Informed patient of benefit period, future follow up, and contact information if any further help or support is needed. Will complete smart form for 6 month follow up on Quit attempt #2.

## 2022-09-12 ENCOUNTER — HOSPITAL ENCOUNTER (EMERGENCY)
Facility: HOSPITAL | Age: 47
Discharge: HOME | End: 2022-09-12
Attending: EMERGENCY MEDICINE
Payer: MEDICAID

## 2022-09-12 VITALS
BODY MASS INDEX: 27.31 KG/M2 | HEIGHT: 64 IN | OXYGEN SATURATION: 100 % | WEIGHT: 160 LBS | RESPIRATION RATE: 18 BRPM | HEART RATE: 63 BPM | DIASTOLIC BLOOD PRESSURE: 76 MMHG | SYSTOLIC BLOOD PRESSURE: 137 MMHG | TEMPERATURE: 99 F

## 2022-09-12 DIAGNOSIS — N93.8 DYSFUNCTIONAL UTERINE BLEEDING: Primary | ICD-10-CM

## 2022-09-12 LAB
ABO + RH BLD: NORMAL
ALBUMIN SERPL BCP-MCNC: 3.6 G/DL (ref 3.5–5.2)
ALP SERPL-CCNC: 77 U/L (ref 55–135)
ALT SERPL W/O P-5'-P-CCNC: 21 U/L (ref 10–44)
ANION GAP SERPL CALC-SCNC: 6 MMOL/L (ref 8–16)
AST SERPL-CCNC: 34 U/L (ref 10–40)
B-HCG UR QL: NEGATIVE
BASOPHILS # BLD AUTO: 0.02 K/UL (ref 0–0.2)
BASOPHILS NFR BLD: 0.3 % (ref 0–1.9)
BILIRUB SERPL-MCNC: 0.3 MG/DL (ref 0.1–1)
BLD GP AB SCN CELLS X3 SERPL QL: NORMAL
BUN SERPL-MCNC: 13 MG/DL (ref 6–20)
BUN SERPL-MCNC: 13 MG/DL (ref 6–30)
CALCIUM SERPL-MCNC: 8.9 MG/DL (ref 8.7–10.5)
CHLORIDE SERPL-SCNC: 103 MMOL/L (ref 95–110)
CHLORIDE SERPL-SCNC: 105 MMOL/L (ref 95–110)
CO2 SERPL-SCNC: 26 MMOL/L (ref 23–29)
CREAT SERPL-MCNC: 0.8 MG/DL (ref 0.5–1.4)
CREAT SERPL-MCNC: 0.8 MG/DL (ref 0.5–1.4)
CTP QC/QA: YES
DIFFERENTIAL METHOD: ABNORMAL
EOSINOPHIL # BLD AUTO: 0.1 K/UL (ref 0–0.5)
EOSINOPHIL NFR BLD: 1.1 % (ref 0–8)
ERYTHROCYTE [DISTWIDTH] IN BLOOD BY AUTOMATED COUNT: 21.1 % (ref 11.5–14.5)
EST. GFR  (NO RACE VARIABLE): >60 ML/MIN/1.73 M^2
GLUCOSE SERPL-MCNC: 101 MG/DL (ref 70–110)
GLUCOSE SERPL-MCNC: 102 MG/DL (ref 70–110)
HCT VFR BLD AUTO: 26.1 % (ref 37–48.5)
HCT VFR BLD CALC: 28 %PCV (ref 36–54)
HGB BLD-MCNC: 8.3 G/DL (ref 12–16)
IMM GRANULOCYTES # BLD AUTO: 0.02 K/UL (ref 0–0.04)
IMM GRANULOCYTES NFR BLD AUTO: 0.3 % (ref 0–0.5)
LYMPHOCYTES # BLD AUTO: 2.4 K/UL (ref 1–4.8)
LYMPHOCYTES NFR BLD: 32.7 % (ref 18–48)
MCH RBC QN AUTO: 25 PG (ref 27–31)
MCHC RBC AUTO-ENTMCNC: 31.8 G/DL (ref 32–36)
MCV RBC AUTO: 79 FL (ref 82–98)
MONOCYTES # BLD AUTO: 0.4 K/UL (ref 0.3–1)
MONOCYTES NFR BLD: 5.3 % (ref 4–15)
NEUTROPHILS # BLD AUTO: 4.5 K/UL (ref 1.8–7.7)
NEUTROPHILS NFR BLD: 60.3 % (ref 38–73)
NRBC BLD-RTO: 0 /100 WBC
PLATELET # BLD AUTO: 206 K/UL (ref 150–450)
PMV BLD AUTO: ABNORMAL FL (ref 9.2–12.9)
POC IONIZED CALCIUM: 1.22 MMOL/L (ref 1.06–1.42)
POC TCO2 (MEASURED): 26 MMOL/L (ref 23–29)
POTASSIUM BLD-SCNC: 3.9 MMOL/L (ref 3.5–5.1)
POTASSIUM SERPL-SCNC: 3.8 MMOL/L (ref 3.5–5.1)
PROT SERPL-MCNC: 7.1 G/DL (ref 6–8.4)
RBC # BLD AUTO: 3.32 M/UL (ref 4–5.4)
SAMPLE: ABNORMAL
SODIUM BLD-SCNC: 140 MMOL/L (ref 136–145)
SODIUM SERPL-SCNC: 137 MMOL/L (ref 136–145)
WBC # BLD AUTO: 7.38 K/UL (ref 3.9–12.7)

## 2022-09-12 PROCEDURE — 99284 EMERGENCY DEPT VISIT MOD MDM: CPT | Mod: 25

## 2022-09-12 PROCEDURE — 85025 COMPLETE CBC W/AUTO DIFF WBC: CPT | Performed by: PHYSICIAN ASSISTANT

## 2022-09-12 PROCEDURE — 25000003 PHARM REV CODE 250: Performed by: PHYSICIAN ASSISTANT

## 2022-09-12 PROCEDURE — 80053 COMPREHEN METABOLIC PANEL: CPT | Performed by: PHYSICIAN ASSISTANT

## 2022-09-12 PROCEDURE — 80047 BASIC METABLC PNL IONIZED CA: CPT

## 2022-09-12 PROCEDURE — 81025 URINE PREGNANCY TEST: CPT | Performed by: PHYSICIAN ASSISTANT

## 2022-09-12 PROCEDURE — 99282 PR EMERGENCY DEPT VISIT,LEVEL II: ICD-10-PCS | Mod: ,,, | Performed by: PHYSICIAN ASSISTANT

## 2022-09-12 PROCEDURE — 99282 EMERGENCY DEPT VISIT SF MDM: CPT | Mod: ,,, | Performed by: PHYSICIAN ASSISTANT

## 2022-09-12 PROCEDURE — 86901 BLOOD TYPING SEROLOGIC RH(D): CPT | Performed by: PHYSICIAN ASSISTANT

## 2022-09-12 PROCEDURE — 82330 ASSAY OF CALCIUM: CPT

## 2022-09-12 RX ADMIN — SODIUM CHLORIDE 1000 ML: 0.9 INJECTION, SOLUTION INTRAVENOUS at 02:09

## 2022-09-12 NOTE — ED NOTES
"Pt presents to ED w/ c/o generalized weakness. Pt states she has been on her period x3 days. Reports heavier flow than usual. States she "is having difficulty standing because she feels so weak." Pt is AAOx4. RR is even, unlabored, and spontaneous. Skin is warm, dry and intact. Denies nausea, vomiting, fever, chills, or any other symptoms.     Patient identifiers for Patti Ursin 46 y.o. female checked and correct.  Chief Complaint   Patient presents with    Vaginal Bleeding     Pt states she is on her menstrual period and is now having weakness.      Past Medical History:   Diagnosis Date    Encounter for blood transfusion     4 per patient report (as of Feb 2021)    Hx of ischemic right ITZ stroke 7/1/2017    Iron deficiency anemia     Tobacco use disorder 2/12/2021    Uterine fibroid      Allergies reported: Review of patient's allergies indicates:  No Known Allergies  "

## 2022-09-12 NOTE — ED NOTES
I-STAT Chem-8+ Results:   Value Reference Range   Sodium 102 136-145 mmol/L   Potassium  3.9 3.5-5.1 mmol/L   Chloride 103  mmol/L   Ionized Calcium 1.22 1.06-1.42 mmol/L   CO2 (measured) 26 23-29 mmol/L   Glucose 140  mg/dL   BUN 13 6-30 mg/dL   Creatinine 0.8 0.5-1.4 mg/dL   Hematocrit 28 36-54%

## 2022-09-12 NOTE — ED PROVIDER NOTES
Encounter Date: 2022       History     Chief Complaint   Patient presents with    Vaginal Bleeding     Pt states she is on her menstrual period and is now having weakness.      This is a 46 year old female with a PMH of uterine fibroids and iron deficiency anemia presenting to the ED with a chief complaint of menorrhagia. Patient states she is currently menstruating and has a heavy menstrual flow. She has to change her pad every hour. Today she reports generalized weakness, especially in her legs. She has received transfusions in the past for anemia due to menorrhagia and desires evaluation for this. She denies fever, chills, nausea/vomiting, abdominal pain, pelvic or back pain, lightheadedness, palpitations, SOB.    Review of patient's allergies indicates:  No Known Allergies  Past Medical History:   Diagnosis Date    Encounter for blood transfusion     4 per patient report (as of 2021)    Hx of ischemic right ITZ stroke 2017    Iron deficiency anemia     Tobacco use disorder 2021    Uterine fibroid      Past Surgical History:   Procedure Laterality Date     SECTION      x2    tubal ligation       Family History   Problem Relation Age of Onset    Anemia Mother     Stroke Mother     Throat cancer Father     Myasthenia gravis Child     Myasthenia gravis Child      Social History     Tobacco Use    Smoking status: Every Day     Packs/day: 1.00     Years: 20.00     Pack years: 20.00     Types: Cigarettes     Start date:     Smokeless tobacco: Never   Substance Use Topics    Alcohol use: Not Currently     Alcohol/week: 1.0 standard drink     Types: 1 Shots of liquor per week     Comment: Quit in     Drug use: No     Review of Systems   Constitutional:  Negative for chills and fever.   Respiratory:  Negative for shortness of breath.    Cardiovascular:  Negative for chest pain and palpitations.   Gastrointestinal:  Negative for nausea and vomiting.   Genitourinary:  Positive for menstrual  problem and vaginal bleeding. Negative for dysuria.   Musculoskeletal:  Negative for back pain.   Skin:  Negative for rash.   Allergic/Immunologic: Negative for immunocompromised state.   Neurological:  Positive for weakness (generalized). Negative for dizziness and light-headedness.   Hematological:  Does not bruise/bleed easily.     Physical Exam     Initial Vitals [09/12/22 1142]   BP Pulse Resp Temp SpO2   121/75 82 18 98.9 °F (37.2 °C) 99 %      MAP       --         Physical Exam    Constitutional: She appears well-developed and well-nourished. No distress.   HENT:   Head: Atraumatic.   Eyes: Conjunctivae and EOM are normal. Pupils are equal, round, and reactive to light.   Cardiovascular:  Normal rate, regular rhythm and normal heart sounds.           Pulmonary/Chest: Breath sounds normal. No respiratory distress. She has no wheezes. She has no rhonchi. She has no rales.   Abdominal: Abdomen is soft. Bowel sounds are normal. There is no abdominal tenderness.   Genitourinary:    Vaginal bleeding present.   There is bleeding in the vagina.    Genitourinary Comments: Moderate dark red blood in vaginal vault. Blood in cervical os, slow ooze.       Neurological: She is alert and oriented to person, place, and time.   Skin: Skin is warm and dry. No rash noted.       ED Course   Procedures  Labs Reviewed   CBC W/ AUTO DIFFERENTIAL - Abnormal; Notable for the following components:       Result Value    RBC 3.32 (*)     Hemoglobin 8.3 (*)     Hematocrit 26.1 (*)     MCV 79 (*)     MCH 25.0 (*)     MCHC 31.8 (*)     RDW 21.1 (*)     All other components within normal limits   COMPREHENSIVE METABOLIC PANEL - Abnormal; Notable for the following components:    Anion Gap 6 (*)     All other components within normal limits   ISTAT PROCEDURE - Abnormal; Notable for the following components:    POC Hematocrit 28 (*)     All other components within normal limits   POCT URINE PREGNANCY   TYPE & SCREEN   ISTAT CHEM8           Imaging Results    None          Medications   sodium chloride 0.9% bolus 1,000 mL (0 mLs Intravenous Stopped 22 4256)     Medical Decision Making:   History:   Old Medical Records: I decided to obtain old medical records.  Clinical Tests:   Lab Tests: Ordered and Reviewed     APC / Resident Notes:   46 y.o. year old female presenting with vaginal bleeding.    DDx includes but is not limited to menorrhagia, anemia, threatened .     Urine pregnancy is negative. Vitals are stable. H&H 8.3/26.    She was given IV fluids with symptomatic improvement in her generalized weakness. I advised her to f/u with her OBGYN in 1 week and encouraged increasing oral fluid intake.    Discussed findings and plan with patient who verbalized understanding and agrees with the plan and course of treatment. Return to ED precautions discussed. Patient is stable for discharge. I discussed the care of this patient with my supervising physician.                       Clinical Impression:   Final diagnoses:  [N93.8] Dysfunctional uterine bleeding (Primary)        ED Disposition Condition    Discharge Stable          ED Prescriptions    None       Follow-up Information       Follow up With Specialties Details Why Contact Info [facility] Schedule an appointment as soon as possible for a visit   [address] [place] [address] [phone]             Shasha Giraldo PA-C  [phone]

## 2022-12-19 ENCOUNTER — CLINICAL SUPPORT (OUTPATIENT)
Dept: SMOKING CESSATION | Facility: CLINIC | Age: 47
End: 2022-12-19
Payer: COMMERCIAL

## 2022-12-19 DIAGNOSIS — F17.200 NICOTINE DEPENDENCE: Primary | ICD-10-CM

## 2022-12-19 PROCEDURE — 99999 PR PBB SHADOW E&M-EST. PATIENT-LVL I: CPT | Mod: PBBFAC,,,

## 2022-12-19 PROCEDURE — 99407 PR TOBACCO USE CESSATION INTENSIVE >10 MINUTES: ICD-10-PCS | Mod: S$GLB,,,

## 2022-12-19 PROCEDURE — 99999 PR PBB SHADOW E&M-EST. PATIENT-LVL I: ICD-10-PCS | Mod: PBBFAC,,,

## 2022-12-19 PROCEDURE — 99407 BEHAV CHNG SMOKING > 10 MIN: CPT | Mod: S$GLB,,,

## 2022-12-19 NOTE — PROGRESS NOTES
Spoke with patient today in regard to smoking cessation progress for 12-month telephone follow up.  Patient states that she is tobacco free and quit smoking about 2 months ago.  Patient states she is doing well with her quit.  Commended patient on their quit. Informed patient of benefit period, future follow up, and contact information if any further help or support is needed. Will complete smart form for 12 month follow up and resolve Quit attempt #2.

## 2022-12-26 ENCOUNTER — HOSPITAL ENCOUNTER (EMERGENCY)
Facility: HOSPITAL | Age: 47
Discharge: HOME | End: 2022-12-27
Attending: STUDENT IN AN ORGANIZED HEALTH CARE EDUCATION/TRAINING PROGRAM
Payer: MEDICAID

## 2022-12-26 DIAGNOSIS — N93.9 VAGINAL BLEEDING: Primary | ICD-10-CM

## 2022-12-26 LAB
B-HCG UR QL: NEGATIVE
CTP QC/QA: YES

## 2022-12-26 PROCEDURE — 99284 EMERGENCY DEPT VISIT MOD MDM: CPT | Mod: ,,, | Performed by: STUDENT IN AN ORGANIZED HEALTH CARE EDUCATION/TRAINING PROGRAM

## 2022-12-26 PROCEDURE — 81025 URINE PREGNANCY TEST: CPT | Performed by: STUDENT IN AN ORGANIZED HEALTH CARE EDUCATION/TRAINING PROGRAM

## 2022-12-26 PROCEDURE — 36430 TRANSFUSION BLD/BLD COMPNT: CPT

## 2022-12-26 PROCEDURE — 99285 EMERGENCY DEPT VISIT HI MDM: CPT | Mod: 25

## 2022-12-26 PROCEDURE — 99284 PR EMERGENCY DEPT VISIT,LEVEL IV: ICD-10-PCS | Mod: ,,, | Performed by: STUDENT IN AN ORGANIZED HEALTH CARE EDUCATION/TRAINING PROGRAM

## 2022-12-27 VITALS
RESPIRATION RATE: 20 BRPM | SYSTOLIC BLOOD PRESSURE: 116 MMHG | BODY MASS INDEX: 27.31 KG/M2 | TEMPERATURE: 98 F | HEART RATE: 60 BPM | OXYGEN SATURATION: 98 % | DIASTOLIC BLOOD PRESSURE: 75 MMHG | HEIGHT: 64 IN | WEIGHT: 160 LBS

## 2022-12-27 LAB
ABO + RH BLD: NORMAL
BACTERIA #/AREA URNS AUTO: ABNORMAL /HPF
BASOPHILS # BLD AUTO: 0.02 K/UL (ref 0–0.2)
BASOPHILS NFR BLD: 0.2 % (ref 0–1.9)
BILIRUB UR QL STRIP: NEGATIVE
BLD GP AB SCN CELLS X3 SERPL QL: NORMAL
BLD PROD TYP BPU: NORMAL
BLOOD UNIT EXPIRATION DATE: NORMAL
BLOOD UNIT TYPE CODE: 5100
BLOOD UNIT TYPE: NORMAL
CLARITY UR REFRACT.AUTO: CLEAR
CODING SYSTEM: NORMAL
COLOR UR AUTO: YELLOW
DIFFERENTIAL METHOD: ABNORMAL
DISPENSE STATUS: NORMAL
EOSINOPHIL # BLD AUTO: 0.1 K/UL (ref 0–0.5)
EOSINOPHIL NFR BLD: 1.1 % (ref 0–8)
ERYTHROCYTE [DISTWIDTH] IN BLOOD BY AUTOMATED COUNT: 27.8 % (ref 11.5–14.5)
GLUCOSE UR QL STRIP: NEGATIVE
HCT VFR BLD AUTO: 23.5 % (ref 37–48.5)
HCV AB SERPL QL IA: NORMAL
HGB BLD-MCNC: 6.5 G/DL (ref 12–16)
HGB UR QL STRIP: ABNORMAL
HIV 1+2 AB+HIV1 P24 AG SERPL QL IA: NORMAL
HYALINE CASTS UR QL AUTO: 0 /LPF
IMM GRANULOCYTES # BLD AUTO: 0.04 K/UL (ref 0–0.04)
IMM GRANULOCYTES NFR BLD AUTO: 0.4 % (ref 0–0.5)
KETONES UR QL STRIP: NEGATIVE
LEUKOCYTE ESTERASE UR QL STRIP: NEGATIVE
LYMPHOCYTES # BLD AUTO: 2.8 K/UL (ref 1–4.8)
LYMPHOCYTES NFR BLD: 30.6 % (ref 18–48)
MCH RBC QN AUTO: 19.3 PG (ref 27–31)
MCHC RBC AUTO-ENTMCNC: 27.7 G/DL (ref 32–36)
MCV RBC AUTO: 70 FL (ref 82–98)
MICROSCOPIC COMMENT: ABNORMAL
MONOCYTES # BLD AUTO: 0.5 K/UL (ref 0.3–1)
MONOCYTES NFR BLD: 5.5 % (ref 4–15)
NEUTROPHILS # BLD AUTO: 5.8 K/UL (ref 1.8–7.7)
NEUTROPHILS NFR BLD: 62.2 % (ref 38–73)
NITRITE UR QL STRIP: NEGATIVE
NRBC BLD-RTO: 0 /100 WBC
PH UR STRIP: 6 [PH] (ref 5–8)
PLATELET # BLD AUTO: 277 K/UL (ref 150–450)
PMV BLD AUTO: ABNORMAL FL (ref 9.2–12.9)
PROT UR QL STRIP: ABNORMAL
RBC # BLD AUTO: 3.37 M/UL (ref 4–5.4)
RBC #/AREA URNS AUTO: >100 /HPF (ref 0–4)
SP GR UR STRIP: >1.03 (ref 1–1.03)
SQUAMOUS #/AREA URNS AUTO: 5 /HPF
TRANS ERYTHROCYTES VOL PATIENT: NORMAL ML
URN SPEC COLLECT METH UR: ABNORMAL
WBC # BLD AUTO: 9.28 K/UL (ref 3.9–12.7)
WBC #/AREA URNS AUTO: 2 /HPF (ref 0–5)

## 2022-12-27 PROCEDURE — P9021 RED BLOOD CELLS UNIT: HCPCS | Performed by: STUDENT IN AN ORGANIZED HEALTH CARE EDUCATION/TRAINING PROGRAM

## 2022-12-27 PROCEDURE — 81001 URINALYSIS AUTO W/SCOPE: CPT | Performed by: STUDENT IN AN ORGANIZED HEALTH CARE EDUCATION/TRAINING PROGRAM

## 2022-12-27 PROCEDURE — 85025 COMPLETE CBC W/AUTO DIFF WBC: CPT | Performed by: STUDENT IN AN ORGANIZED HEALTH CARE EDUCATION/TRAINING PROGRAM

## 2022-12-27 PROCEDURE — 86920 COMPATIBILITY TEST SPIN: CPT | Performed by: STUDENT IN AN ORGANIZED HEALTH CARE EDUCATION/TRAINING PROGRAM

## 2022-12-27 PROCEDURE — 86900 BLOOD TYPING SEROLOGIC ABO: CPT | Performed by: STUDENT IN AN ORGANIZED HEALTH CARE EDUCATION/TRAINING PROGRAM

## 2022-12-27 PROCEDURE — 87389 HIV-1 AG W/HIV-1&-2 AB AG IA: CPT | Performed by: PHYSICIAN ASSISTANT

## 2022-12-27 PROCEDURE — 36430 TRANSFUSION BLD/BLD COMPNT: CPT

## 2022-12-27 PROCEDURE — 86803 HEPATITIS C AB TEST: CPT | Performed by: PHYSICIAN ASSISTANT

## 2022-12-27 RX ORDER — HYDROCODONE BITARTRATE AND ACETAMINOPHEN 500; 5 MG/1; MG/1
TABLET ORAL
Status: DISCONTINUED | OUTPATIENT
Start: 2022-12-27 | End: 2022-12-27 | Stop reason: HOSPADM

## 2022-12-27 RX ORDER — NAPROXEN 500 MG/1
500 TABLET ORAL 2 TIMES DAILY WITH MEALS
Qty: 14 TABLET | Refills: 0 | Status: SHIPPED | OUTPATIENT
Start: 2022-12-27 | End: 2023-01-03

## 2022-12-27 NOTE — PROVIDER PROGRESS NOTES - EMERGENCY DEPT.
Encounter Date: 12/26/2022    ED Physician Progress Notes            I assumed care of this patient at change of shift from Dr. Man. Briefly, this is a 47 y.o. female who presented with symptomatic anemia 2/2 menometrorrhagia. Getting 1u pRBC here, will dc home with scheduled NSAIDs and gyn follow up when unit of blood is completed.     Workup notable for:     Labs Reviewed   URINALYSIS, REFLEX TO URINE CULTURE - Abnormal; Notable for the following components:       Result Value    Specific Gravity, UA >1.030 (*)     Protein, UA 1+ (*)     Occult Blood UA 3+ (*)     All other components within normal limits    Narrative:     Specimen Source->Urine   CBC W/ AUTO DIFFERENTIAL - Abnormal; Notable for the following components:    RBC 3.37 (*)     Hemoglobin 6.5 (*)     Hematocrit 23.5 (*)     MCV 70 (*)     MCH 19.3 (*)     MCHC 27.7 (*)     RDW 27.8 (*)     All other components within normal limits   URINALYSIS MICROSCOPIC - Abnormal; Notable for the following components:    RBC, UA >100 (*)     All other components within normal limits    Narrative:     Specimen Source->Urine   HIV 1 / 2 ANTIBODY    Narrative:     Release to patient->Immediate   HEPATITIS C ANTIBODY    Narrative:     Release to patient->Immediate   POCT URINE PREGNANCY   TYPE & SCREEN   PREPARE RBC SOFT     No orders to display       Medications   0.9%  NaCl infusion (for blood administration) (has no administration in time range)         Final diagnoses:  [N93.9] Vaginal bleeding (Primary)

## 2022-12-27 NOTE — ED TRIAGE NOTES
"Patti Hernandez, a 47 y.o. female presents to the ED w/ complaint of vaginal bleeding. Pt reports vaginal bleeding for more than a week. Reports "having to constantly change her clothes". Denies fatigue, dizziness and lightheadedness.     Adult Physical Assessment  LOC: Patti Hernandez, 47 y.o. female verified via two identifiers.  The patient is awake, alert, oriented and speaking appropriately at this time.  APPEARANCE: Patient resting comfortably and appears to be in no acute distress at this time. Patient is clean and well groomed, patient's clothing is properly fastened.  SKIN:The skin is warm and dry, color consistent with ethnicity, patient has normal skin turgor and moist mucus membranes, skin intact, no breakdown or brusing noted.  MUSCULOSKELETAL: Patient moving all extremities well, no obvious swelling or deformities noted.  RESPIRATORY: Airway is open and patent, respirations are spontaneous, patient has a normal effort and rate, no accessory muscle use noted.  CARDIAC: Patient has a normal rate and rhythm, no periphreal edema noted in any extremity, capillary refill < 3 seconds in all extremities  ABDOMEN: Soft and non tender to palpation, no abdominal distention noted. Bowel sounds present in all four quadrants.Reports heavy vaginal bleeding for more than a week. Reports "having to constantly change her clothes". Denies fatigue, dizziness and lightheadedness.   NEUROLOGIC: Eyes open spontaneously, behavior appropriate to situation, follows commands, facial expression symmetrical, bilateral hand grasp equal and even, purposeful motor response noted, normal sensation in all extremities when touched with a finger.      Triage note:  Chief Complaint   Patient presents with    Vaginal Bleeding     Pt reports vaginal bleeding for greater than a week and bleeding remains heavy. Denies dizziness/lightheadness. When asked how many pads pt is going through, pt reports "every two or three minutes I need a new one." "      Review of patient's allergies indicates:  No Known Allergies  Past Medical History:   Diagnosis Date    Encounter for blood transfusion     4 per patient report (as of Feb 2021)    Hx of ischemic right ITZ stroke 7/1/2017    Iron deficiency anemia     Tobacco use disorder 2/12/2021    Uterine fibroid

## 2022-12-27 NOTE — ED PROVIDER NOTES
"Encounter Date: 2022    SCRIBE #1 NOTE: I, Janet Goldberg, am scribing for, and in the presence of,  Ruby Man MD. I have scribed the following portions of the note - Other sections scribed: HPI, ROS, PE.     History     Chief Complaint   Patient presents with    Vaginal Bleeding     Pt reports vaginal bleeding for greater than a week and bleeding remains heavy. Denies dizziness/lightheadness. When asked how many pads pt is going through, pt reports "every two or three minutes I need a new one."      Patti Hernandez is a 47 y.o. female with history of anemia, menorrhagia, uterine fibroids, prior CVA presents for vaginal bleeding. She states that she has been having vaginal bleeding for the past week. She is currently on her second 32 count pack of pads. She reports heavy vaginal bleeding with passage of large clots. She states that the bleeding is intermittently heavier at times. She denies abdominal pain, dizziness/lightheadedness, shortness of breath. She does not take contraceptives.     The history is provided by the patient and medical records. No  was used.   Review of patient's allergies indicates:  No Known Allergies  Past Medical History:   Diagnosis Date    Encounter for blood transfusion     4 per patient report (as of 2021)    Hx of ischemic right ITZ stroke 2017    Iron deficiency anemia     Tobacco use disorder 2021    Uterine fibroid      Past Surgical History:   Procedure Laterality Date     SECTION      x2    tubal ligation       Family History   Problem Relation Age of Onset    Anemia Mother     Stroke Mother     Throat cancer Father     Myasthenia gravis Child     Myasthenia gravis Child      Social History     Tobacco Use    Smoking status: Every Day     Packs/day: 1.00     Years: 20.00     Pack years: 20.00     Types: Cigarettes     Start date:     Smokeless tobacco: Never   Substance Use Topics    Alcohol use: Not Currently     " Alcohol/week: 1.0 standard drink     Types: 1 Shots of liquor per week     Comment: Quit in 2020    Drug use: No     Review of Systems    Constitutional: No fever  HENT: No sore throat  Eyes: No eye pain  Respiratory: No shortness of breath  Cardiovascular: No chest pain  Gastrointestinal: No abdominal pain, no vomiting, no diarrhea  Genitourinary: No dysuria, + vaginal bleeding, no abnormal vaginal discharge, no hematuria  Musculoskeletal: No back pain  Neurological: No headache  Psychiatric: No agitation      Physical Exam     Initial Vitals [12/26/22 2248]   BP Pulse Resp Temp SpO2   127/69 99 16 98.4 °F (36.9 °C) 100 %      MAP       --         Physical Exam    Nursing note and vitals reviewed.    Constitutional: No acute distress, well appearing  Respiratory: Non-labored, lungs clear  Cardiovascular: Well perfused, normal rate, regular rhythm  Gastrointestinal: Soft, non-tender, non-distended  Genitourinary: Normal external genitalia, blood present in the vaginal vault without large clots or pooling, no oozing from the cervix, cervix normal in appearance, os closed, no CMT   Integumentary: Warm and dry  Musculoskeletal: No deformity  Neurological: Awake and alert  Psychiatric: Cooperative      ED Course   Procedures  Labs Reviewed   CBC W/ AUTO DIFFERENTIAL - Abnormal; Notable for the following components:       Result Value    RBC 3.37 (*)     Hemoglobin 6.5 (*)     Hematocrit 23.5 (*)     MCV 70 (*)     MCH 19.3 (*)     MCHC 27.7 (*)     RDW 27.8 (*)     All other components within normal limits   HIV 1 / 2 ANTIBODY   HEPATITIS C ANTIBODY   URINALYSIS, REFLEX TO URINE CULTURE   URINALYSIS MICROSCOPIC   POCT URINE PREGNANCY   TYPE & SCREEN   PREPARE RBC SOFT          Imaging Results    None          Medications   0.9%  NaCl infusion (for blood administration) (has no administration in time range)     Medical Decision Making:   History:   Old Medical Records: I decided to obtain old medical records.  Old  Records Summarized: records from clinic visits and records from previous admission(s).  Clinical Tests:   Lab Tests: Ordered and Reviewed  ED Management:  Patient presents for heavy vaginal bleeding.  Pelvic exam performed and patient does not have significant bleeding at this time.  No gross pooling or oozing from the cervix.  She is hemodynamically stable.  Vitals and exam reassuring.  She has a history of menorrhagia in his required transfusions in the past for anemia.  Her UPT is negative.  Urinalysis and CBC pending.  Plan to closely monitor and follow up labs.    CBC reviewed.  Patient is anemic with hemoglobin 6.5.  Her baseline appears to be around 7-8.  Patient was consented for 1 unit of PRBCs.  We also discussed trialing scheduled naproxen for a week to help with menorrhagia.  I put in an OBGYN referral for the patient.  She is instructed to follow-up with OBGYN as soon as possible and I also talked to her about bleeding precautions.  Blood transfusion pending at time of changeover to oncoming staff.  Plan to discharge home after transfusion.        Scribe Attestation:   Scribe #1: I performed the above scribed service and the documentation accurately describes the services I performed. I attest to the accuracy of the note.      ED Course as of 12/27/22 0052   Mon Dec 26, 2022   2350 Preg Test, Ur: Negative [NN]   Tue Dec 27, 2022   0034 HEMOGLOBIN(!): 6.5 [NN]      ED Course User Index  [NN] Ruby Man MD                 Clinical Impression:   Final diagnoses:  [N93.9] Vaginal bleeding (Primary)        ED Disposition Condition    Discharge Stable          ED Prescriptions       Medication Sig Dispense Start Date End Date Auth. Provider    naproxen (NAPROSYN) 500 MG tablet Take 1 tablet (500 mg total) by mouth 2 (two) times daily with meals. for 7 days 14 tablet 12/27/2022 1/3/2023 Ruby Man MD          Follow-up Information       Follow up With Specialties Details Why Contact Info  Additional Information    Megha Brar, NP Family Medicine Schedule an appointment as soon as possible for a visit   [address] [address]  [phone]       [facility] - Emergency Dept Emergency Medicine  As needed, If symptoms worsen [address] [facility] [address] [phone]     [facility] - Ob/Gyn 5th Fl Obstetrics and Gynecology Schedule an appointment as soon as possible for a visit   [address] [facility] [address] [phone] Main Building, 5th Floor Please park in [facility] and take Clinic elevator             Ruby Man MD  12/27/22 0053

## 2022-12-27 NOTE — DISCHARGE INSTRUCTIONS
Please take the naproxen as prescribed.  You should make sure it eat food before you take this as it can cause upset stomach.  A referral for you to see OBGYN has been placed.  If you do not receive a call the next 24-48 hours to schedule an appointment, you should call the number listed in this discharge paperwork to schedule an appointment.  If you develop heavy vaginal bleeding and soaked through a pad or more per hour for 2 consecutive hours or you develop lightheadedness, dizziness or shortness of breath, you should return to the emergency department for re-evaluation.

## 2023-01-13 ENCOUNTER — HOSPITAL ENCOUNTER (EMERGENCY)
Facility: HOSPITAL | Age: 48
Discharge: HOME | End: 2023-01-13
Attending: STUDENT IN AN ORGANIZED HEALTH CARE EDUCATION/TRAINING PROGRAM
Payer: MEDICAID

## 2023-01-13 VITALS
TEMPERATURE: 98 F | DIASTOLIC BLOOD PRESSURE: 69 MMHG | WEIGHT: 160 LBS | HEART RATE: 66 BPM | SYSTOLIC BLOOD PRESSURE: 112 MMHG | BODY MASS INDEX: 27.46 KG/M2 | OXYGEN SATURATION: 100 % | RESPIRATION RATE: 16 BRPM

## 2023-01-13 DIAGNOSIS — L03.011 FELON OF FINGER OF RIGHT HAND: Primary | ICD-10-CM

## 2023-01-13 PROCEDURE — 99284 EMERGENCY DEPT VISIT MOD MDM: CPT | Mod: 25,,, | Performed by: STUDENT IN AN ORGANIZED HEALTH CARE EDUCATION/TRAINING PROGRAM

## 2023-01-13 PROCEDURE — 26011 DRAINAGE OF FINGER ABSCESS: CPT | Mod: F7

## 2023-01-13 PROCEDURE — 26011 DRAINAGE OF FINGER ABSCESS: CPT | Mod: F7,,, | Performed by: STUDENT IN AN ORGANIZED HEALTH CARE EDUCATION/TRAINING PROGRAM

## 2023-01-13 PROCEDURE — 99284 PR EMERGENCY DEPT VISIT,LEVEL IV: ICD-10-PCS | Mod: 25,,, | Performed by: STUDENT IN AN ORGANIZED HEALTH CARE EDUCATION/TRAINING PROGRAM

## 2023-01-13 PROCEDURE — 99284 EMERGENCY DEPT VISIT MOD MDM: CPT | Mod: 25

## 2023-01-13 PROCEDURE — 25000003 PHARM REV CODE 250: Performed by: STUDENT IN AN ORGANIZED HEALTH CARE EDUCATION/TRAINING PROGRAM

## 2023-01-13 PROCEDURE — 26011 PR DRAIN FINGER ABSCESS,COMPLICATED: ICD-10-PCS | Mod: F7,,, | Performed by: STUDENT IN AN ORGANIZED HEALTH CARE EDUCATION/TRAINING PROGRAM

## 2023-01-13 RX ORDER — LIDOCAINE HYDROCHLORIDE 10 MG/ML
5 INJECTION INFILTRATION; PERINEURAL
Status: COMPLETED | OUTPATIENT
Start: 2023-01-13 | End: 2023-01-13

## 2023-01-13 RX ORDER — HYDROCODONE BITARTRATE AND ACETAMINOPHEN 5; 325 MG/1; MG/1
1 TABLET ORAL
Status: COMPLETED | OUTPATIENT
Start: 2023-01-13 | End: 2023-01-13

## 2023-01-13 RX ORDER — SULFAMETHOXAZOLE AND TRIMETHOPRIM 800; 160 MG/1; MG/1
1 TABLET ORAL 2 TIMES DAILY
Qty: 14 TABLET | Refills: 0 | Status: ON HOLD | OUTPATIENT
Start: 2023-01-13 | End: 2023-01-22 | Stop reason: HOSPADM

## 2023-01-13 RX ORDER — CEPHALEXIN 500 MG/1
500 CAPSULE ORAL 4 TIMES DAILY
Qty: 28 CAPSULE | Refills: 0 | Status: ON HOLD | OUTPATIENT
Start: 2023-01-13 | End: 2023-01-22 | Stop reason: HOSPADM

## 2023-01-13 RX ORDER — HYDROCODONE BITARTRATE AND ACETAMINOPHEN 5; 325 MG/1; MG/1
1 TABLET ORAL EVERY 6 HOURS PRN
Qty: 12 TABLET | Refills: 0 | Status: ON HOLD | OUTPATIENT
Start: 2023-01-13 | End: 2023-01-22 | Stop reason: HOSPADM

## 2023-01-13 RX ADMIN — LIDOCAINE HYDROCHLORIDE 5 ML: 10 INJECTION, SOLUTION INFILTRATION; PERINEURAL at 02:01

## 2023-01-13 RX ADMIN — HYDROCODONE BITARTRATE AND ACETAMINOPHEN 1 TABLET: 5; 325 TABLET ORAL at 11:01

## 2023-01-13 NOTE — ED TRIAGE NOTES
Pt present to ED with c/o hand pain and swelling since Wednesday. Pt reports cannot sleep because pain is so bad. ROM intact, Denies any other complaints.

## 2023-01-13 NOTE — ED PROVIDER NOTES
Encounter Date: 2023    SCRIBE #1 NOTE: I, Shira Mueller, am scribing for, and in the presence of,  Ruby Man MD. I have scribed the following portions of the note - Other sections scribed: HPI, PE.     History     Chief Complaint   Patient presents with    Hand Pain     Right hand pain and swelling; denies injury       Patient is a 47-year-old female who presents for swelling of her right distal middle finger.  Onset of symptoms 2 days ago.  Associated with moderate pain and redness.  She denies any injury to the area.  She does have acrylic nails but denies any pain around her nail bed.  She tried soaking her finger in Epsom salt and took ibuprofen with minimal improvement.  No fevers, chills, vomiting.  No history of herpes.  Patient is right-hand dominant.      The history is provided by the patient and medical records. No  was used.     Review of patient's allergies indicates:  No Known Allergies  Past Medical History:   Diagnosis Date    Encounter for blood transfusion     4 per patient report (as of 2021)    Hx of ischemic right ITZ stroke 2017    Iron deficiency anemia     Tobacco use disorder 2021    Uterine fibroid      Past Surgical History:   Procedure Laterality Date     SECTION      x2    tubal ligation       Family History   Problem Relation Age of Onset    Anemia Mother     Stroke Mother     Throat cancer Father     Myasthenia gravis Child     Myasthenia gravis Child      Social History     Tobacco Use    Smoking status: Every Day     Packs/day: 1.00     Years: 20.00     Pack years: 20.00     Types: Cigarettes     Start date:     Smokeless tobacco: Never   Substance Use Topics    Alcohol use: Not Currently     Alcohol/week: 1.0 standard drink     Types: 1 Shots of liquor per week     Comment: Quit in     Drug use: No     Review of Systems  See pertinent review of systems in the HPI.    Physical Exam     Initial Vitals [23 0921]   BP  Pulse Resp Temp SpO2   (!) 173/97 92 20 97.4 °F (36.3 °C) 100 %      MAP       --         Physical Exam  Constitutional: No acute distress, well-appearing  Respiratory: Non-labored  Cardiovascular: Well perfused  Gastrointestinal: Soft, non-tender, non-distended  Integumentary: Warm and dry  Musculoskeletal: No deformity, right hand with swelling, erythema and tenderness of the pulp of the distal tip of the middle finger, no swelling, erythema or warmth around the nail bed or any other areas of the finger other than the finger tip, no vesicular lesions, good capillary refill  Neurological: Awake and alert  Psychiatric: Cooperative     ED Course   I & D - Incision and Drainage    Date/Time: 1/13/2023 6:07 PM  Location procedure was performed: [facility] EMERGENCY DEPARTMENT  Performed by: Ruby Man MD  Authorized by: Ruby Man MD   Consent Done: Yes  Consent: Verbal consent obtained.  Risks and benefits: risks, benefits and alternatives were discussed  Consent given by: patient  Indications for incision and drainage: felon.  Body area: upper extremity (right middle finger)  Anesthesia: digital block    Anesthesia:  Local Anesthetic: lidocaine 1% without epinephrine  Anesthetic total: 5 mL  Scalpel size: 11  Incision type: single straight (11 blade used to make bilateral lateral incisions through and through the pulp of the finger tip)  Drainage: bloody  Drainage amount: scant  Wound treatment: incision and wound left open (dressing applied)  Complications: No      Labs Reviewed - No data to display       Imaging Results              X-Ray Hand 3 view Right (Final result)  Result time 01/13/23 12:43:52      Final result by Colin Shields MD (01/13/23 12:43:52)                   Impression:      As above      Electronically signed by: Colin Shields  Date:    01/13/2023  Time:    12:43               Narrative:    EXAMINATION:  XR HAND COMPLETE 3 VIEW RIGHT    CLINICAL HISTORY:  hand pain and  swelling;    TECHNIQUE:  PA, lateral, and oblique views of the right hand were performed.    COMPARISON:  None    FINDINGS:  No fracture.  No malalignment.  Preserved bone density.  Preserved joint spaces.  No opaque soft tissue foreign body.  Soft tissue swelling dorsally at the level of the metacarpals and MCP articulations.                                       Medications   HYDROcodone-acetaminophen 5-325 mg per tablet 1 tablet (1 tablet Oral Given 1/13/23 1157)   LIDOcaine HCL 10 mg/ml (1%) injection 5 mL (5 mLs Subcutaneous Given by Provider 1/13/23 1400)     Medical Decision Making:   History:   Old Medical Records: I decided to obtain old medical records.  Clinical Tests:   Radiological Study: Ordered and Reviewed  ED Management:  Patient here for swelling of the distal tip of the right middle finger.  Swelling is isolated to the pulp of the distal tip of that finger.  No Kanavel signs - finger is not held in a flexed position, no fusiform swelling, no tenderness along the flexor sheath, no pain with passive extension of the digit.  No vesicular lesions or wounds to indicate herpetic daniel.  No signs of paronychia.  Her exam is most consistent with a felon.  X-ray negative.  I&D performed inpatient tolerated procedure well.  Patient is sent home on antibiotics and provided hand clinic referral.  Patient was counseled extensively on return precautions prior to discharge.  Patient feels comfortable discharge home.        Scribe Attestation:   Scribe #1: I performed the above scribed service and the documentation accurately describes the services I performed. I attest to the accuracy of the note.                   Clinical Impression:   Final diagnoses:  [L03.011] Felon of finger of right hand (Primary)        ED Disposition Condition    Discharge Stable          ED Prescriptions       Medication Sig Dispense Start Date End Date Auth. Provider    cephALEXin (KEFLEX) 500 MG capsule Take 1 capsule (500 mg  total) by mouth 4 (four) times daily. for 7 days 28 capsule 1/13/2023 1/20/2023 Ruby Man MD    sulfamethoxazole-trimethoprim 800-160mg (BACTRIM DS) 800-160 mg Tab Take 1 tablet by mouth 2 (two) times daily. for 7 days 14 tablet 1/13/2023 1/20/2023 Ruby Man MD    HYDROcodone-acetaminophen (NORCO) 5-325 mg per tablet Take 1 tablet by mouth every 6 (six) hours as needed for Pain. 12 tablet 1/13/2023 -- Ruby Man MD          Follow-up Information       Follow up With Specialties Details Why Contact Info Additional Information    Megha Brar, NP Family Medicine Schedule an appointment as soon as possible for a visit   [address] [address] [phone]       [facility] - Emergency Dept Emergency Medicine  As needed, If symptoms worsen [address] [facility] [id number] [phone]     [facility] - Orthopedics (Hand) Outpatient Rehab Schedule an appointment as soon as possible for a visit   [address] [facility] [id number] [phone] [facility] - 5th Floor             Ruby Man MD  01/13/23 3864

## 2023-01-13 NOTE — Clinical Note
"Patti "Patti" David was seen and treated in our emergency department on 1/13/2023.  She may return to work on 01/16/2023.       If you have any questions or concerns, please don't hesitate to call.      Ruby Man MD"

## 2023-01-13 NOTE — DISCHARGE INSTRUCTIONS
You were diagnosed with an infection of your fingertip today this infection is also sometimes referred to as a Felon.  Please take both of the antibiotics as prescribed.  You should follow-up with hand surgery within the next week.  Please follow-up with your primary care doctor as well.  If the swelling, pain, redness worsens, you should return to the emergency department for re-evaluation.  If you develop fevers, chills or any other worsening symptoms or concerns, please return to the emergency department.  You have also been prescribed Norco today for pain.  Please take this as prescribed and do not operate heavy machinery while taking this medication.

## 2023-01-16 ENCOUNTER — HOSPITAL ENCOUNTER (EMERGENCY)
Facility: HOSPITAL | Age: 48
Discharge: HOME | End: 2023-01-16
Attending: EMERGENCY MEDICINE
Payer: MEDICAID

## 2023-01-16 VITALS
HEART RATE: 106 BPM | RESPIRATION RATE: 18 BRPM | BODY MASS INDEX: 27.31 KG/M2 | SYSTOLIC BLOOD PRESSURE: 141 MMHG | TEMPERATURE: 98 F | HEIGHT: 64 IN | WEIGHT: 160 LBS | DIASTOLIC BLOOD PRESSURE: 89 MMHG | OXYGEN SATURATION: 100 %

## 2023-01-16 DIAGNOSIS — L03.011 FELON OF FINGER OF RIGHT HAND: Primary | ICD-10-CM

## 2023-01-16 LAB
GRAM STN SPEC: NORMAL
POCT GLUCOSE: 159 MG/DL (ref 70–110)

## 2023-01-16 PROCEDURE — 99283 EMERGENCY DEPT VISIT LOW MDM: CPT | Mod: 25

## 2023-01-16 PROCEDURE — 87015 SPECIMEN INFECT AGNT CONCNTJ: CPT

## 2023-01-16 PROCEDURE — 82962 GLUCOSE BLOOD TEST: CPT

## 2023-01-16 PROCEDURE — 87076 CULTURE ANAEROBE IDENT EACH: CPT

## 2023-01-16 PROCEDURE — 25000003 PHARM REV CODE 250: Performed by: PHYSICIAN ASSISTANT

## 2023-01-16 PROCEDURE — 87116 MYCOBACTERIA CULTURE: CPT

## 2023-01-16 PROCEDURE — 87205 SMEAR GRAM STAIN: CPT

## 2023-01-16 PROCEDURE — 87077 CULTURE AEROBIC IDENTIFY: CPT

## 2023-01-16 PROCEDURE — 87102 FUNGUS ISOLATION CULTURE: CPT

## 2023-01-16 PROCEDURE — 99284 EMERGENCY DEPT VISIT MOD MDM: CPT | Mod: ,,, | Performed by: PHYSICIAN ASSISTANT

## 2023-01-16 PROCEDURE — 26011 DRAINAGE OF FINGER ABSCESS: CPT | Mod: F7

## 2023-01-16 PROCEDURE — 99284 PR EMERGENCY DEPT VISIT,LEVEL IV: ICD-10-PCS | Mod: ,,, | Performed by: PHYSICIAN ASSISTANT

## 2023-01-16 PROCEDURE — 87070 CULTURE OTHR SPECIMN AEROBIC: CPT

## 2023-01-16 PROCEDURE — 87186 SC STD MICRODIL/AGAR DIL: CPT

## 2023-01-16 PROCEDURE — 87206 SMEAR FLUORESCENT/ACID STAI: CPT

## 2023-01-16 PROCEDURE — 87075 CULTR BACTERIA EXCEPT BLOOD: CPT

## 2023-01-16 RX ORDER — CLINDAMYCIN HYDROCHLORIDE 150 MG/1
450 CAPSULE ORAL 3 TIMES DAILY
Qty: 63 CAPSULE | Refills: 0 | Status: ON HOLD | OUTPATIENT
Start: 2023-01-16 | End: 2023-01-22 | Stop reason: HOSPADM

## 2023-01-16 RX ORDER — CLARITHROMYCIN 500 MG/1
TABLET, FILM COATED ORAL
Status: ON HOLD | COMMUNITY
End: 2023-01-22 | Stop reason: HOSPADM

## 2023-01-16 RX ORDER — AMOXICILLIN 500 MG/1
CAPSULE ORAL
Status: ON HOLD | COMMUNITY
End: 2023-01-22 | Stop reason: HOSPADM

## 2023-01-16 RX ORDER — LIDOCAINE HYDROCHLORIDE 10 MG/ML
10 INJECTION INFILTRATION; PERINEURAL
Status: COMPLETED | OUTPATIENT
Start: 2023-01-16 | End: 2023-01-16

## 2023-01-16 RX ADMIN — LIDOCAINE HYDROCHLORIDE 10 ML: 10 INJECTION, SOLUTION INFILTRATION; PERINEURAL at 08:01

## 2023-01-16 NOTE — Clinical Note
"Patti "Patti" David was seen and treated in our emergency department on 1/16/2023.  She may return to work on 01/19/2023.       If you have any questions or concerns, please don't hesitate to call.      Tamera POTTER    "

## 2023-01-17 ENCOUNTER — TELEPHONE (OUTPATIENT)
Dept: ORTHOPEDICS | Facility: CLINIC | Age: 48
End: 2023-01-17
Payer: MEDICAID

## 2023-01-17 ENCOUNTER — NURSE TRIAGE (OUTPATIENT)
Dept: ADMINISTRATIVE | Facility: CLINIC | Age: 48
End: 2023-01-17
Payer: MEDICAID

## 2023-01-17 NOTE — PROGRESS NOTES
Subjective:      Patient ID: Patti Hernandez is a 47 y.o. female.    Chief Complaint: Pain of the Right Hand      HPI  Patti Hernandez is a 47 y.o. female, PMH significant for right sided ITZ stroke in 10/2021, presenting today for follow-up from the ED.  She states that she had acrylic nails placed last week, and her pain began shortly after that.  Symptoms began 1/11/2023.  She went to the ED on 01/13/2023 and bedside I and D of the felon was performed, she was discharged with Keflex and Bactrim but reported only received and took the Keflex.  She was seen in the ED again 1/16/2023, right long finger felon irrigation and debridement was performed by the orthopedic resident.  Discharged on Clindamycin 450 mg TID. She presents today for recheck and removal of packing.    MICROBIOLOGY:  Gram Stain Result Rare WBC's    Gram Stain Result Few Gram positive cocci    Gram Stain Result Rare Gram negative rods      Aerobic Bacterial Culture  Abnormal   GRAM NEGATIVE SOL   Few   Identification and susceptibility pending          Anaerobic, Fungal, AFB pending      Review of patient's allergies indicates:  No Known Allergies      Current Outpatient Medications   Medication Sig Dispense Refill    amoxicillin (AMOXIL) 500 MG capsule amoxicillin 500 mg capsule      cephALEXin (KEFLEX) 500 MG capsule Take 1 capsule (500 mg total) by mouth 4 (four) times daily. for 7 days 28 capsule 0    clarithromycin (BIAXIN) 500 MG tablet clarithromycin 500 mg tablet      clindamycin (CLEOCIN) 150 MG capsule Take 3 capsules (450 mg total) by mouth 3 (three) times daily. for 7 days 63 capsule 0    ferrous sulfate 325 (65 FE) MG EC tablet Take 1 tablet (325 mg total) by mouth 2 (two) times daily. 60 tablet 0    HYDROcodone-acetaminophen (NORCO) 5-325 mg per tablet Take 1 tablet by mouth every 6 (six) hours as needed for Pain. 12 tablet 0    nicotine (NICODERM CQ) 21 mg/24 hr Place 1 patch onto the skin once daily. 14 patch 0     "sulfamethoxazole-trimethoprim 800-160mg (BACTRIM DS) 800-160 mg Tab Take 1 tablet by mouth 2 (two) times daily. for 7 days 14 tablet 0    aspirin 81 MG Chew Take 1 tablet (81 mg total) by mouth once daily. 30 tablet 11    atorvastatin (LIPITOR) 40 MG tablet Take 1 tablet (40 mg total) by mouth once daily. (Patient not taking: Reported on 2021) 90 tablet 3    omeprazole (PRILOSEC) 20 MG capsule Take 1 capsule (20 mg total) by mouth 2 (two) times a day. for 10 days 20 capsule 0    traMADoL (ULTRAM) 50 mg tablet Take 1 tablet (50 mg total) by mouth every 6 (six) hours as needed for Pain. 12 tablet 0     No current facility-administered medications for this visit.       Past Medical History:   Diagnosis Date    Encounter for blood transfusion     4 per patient report (as of 2021)    Hx of ischemic right ITZ stroke 2017    Iron deficiency anemia     Tobacco use disorder 2021    Uterine fibroid        Past Surgical History:   Procedure Laterality Date     SECTION      x2    tubal ligation           Review of Systems:  ROS:  Constitutional: no fever or chills  Skin: no rash or suspicious lesions  Musculoskeletal: See HPI.   Neurological: no headaches, lightheadedness, or dizziness. No numbness or tingling  Psychological/behavioral: no anxiety or depression      OBJECTIVE:     PHYSICAL EXAM:  Height: 5' 4" (162.6 cm) Weight: 72.6 kg (160 lb)  Vitals:    23 1339   BP: 122/84   Pulse: 105   Weight: 72.6 kg (160 lb)   Height: 5' 4" (1.626 m)   PainSc: 10-Worst pain ever     Vitals reviewed.  Constitutional: NAD. Patient appears well-developed and well-nourished.   HENT:   Head: Normocephalic and atraumatic.   Neck: Normal range of motion.   Cardiovascular: Normal rate.    Pulmonary/Chest: Effort normal. No respiratory distress.   Neurological: Patient is awake, alert, oriented.   Psychiatric: Patient has a normal mood and affect. Behavior is normal. Judgment and thought content " normal.  Musculoskeletal:  Right long finger with mild edema and mild residual erythema, no increased warmth of the skin.  Packing is in place.  Packing was removed gently, purulent discharge was expressed with packing removal and pressure over the pad of the right long finger.  Pain with even light touch over the right long finger.  Neurovascularly intact-reports equal median, ulnar, and radial sensation.  Fair to good flexion and good extension of the right long finger.  New soft bulky dressing applied    RADIOGRAPHS:  Right hand XRay, 1/13/2023  FINDINGS:  No fracture.  No malalignment.  Preserved bone density.  Preserved joint spaces.  No opaque soft tissue foreign body.  Soft tissue swelling dorsally at the level of the metacarpals and MCP articulations.     Impression:  As above    Comments: I have personally reviewed the imaging and I agree with the above radiologist's report.    ASSESSMENT/PLAN:   Patti was seen today for pain.    Diagnoses and all orders for this visit:    Felon of finger  -     Ambulatory referral/consult to Infectious Disease; Future    Other orders  -     Discontinue: traMADoL (ULTRAM) 50 mg tablet; Take 1 tablet (50 mg total) by mouth every 6 (six) hours as needed for Pain.  -     traMADoL (ULTRAM) 50 mg tablet; Take 1 tablet (50 mg total) by mouth every 6 (six) hours as needed for Pain.           - We talked at length about the anatomy and pathophysiology of   Encounter Diagnosis   Name Primary?    Hair norma finger Yes       - continue clindamycin as prescribed, awaiting culture identification and sensitivity  - Referral to ID  - tramadol sent to pharmacy for pain  - follow-up in 2 days, sooner if needed  - discussed that if pain increases or if she develops fever chills she should go to the ED  - call with any questions or concerns    Disclaimer: This note has been generated using voice-recognition software. There may be typographical errors that have been missed during  proof-reading.

## 2023-01-17 NOTE — CONSULTS
[facility]  Orthopedics  Consult Note    Patient Name: Patti Hernandez  MRN: [id number]  Admission Date: 2023  Hospital Length of Stay: 0 days  Attending Provider: No att. providers found  Primary Care Provider: Megha Brar NP    Inpatient consult to Orthopedics  Consult performed by: Tio Jenkins MD  Consult ordered by: John Berry PA-C        Subjective:     Principal Problem:<principal problem not specified>    Chief Complaint:   Chief Complaint   Patient presents with    Hand Pain     Had r middle finger lanced 2d ago , more pain and swelling        HPI: Patti Hernandez is a 47 y.o. female with PMH significant for right sided ITZ stroke in 10/2021, iron deficiency anemia, and uterine fibroids presenting with 5 day history of worsening pain and swelling of the right distal middle finger. Patient states that she had acrylic nails placed last week, and her pain began shortly after that. She states that it's a sharp 10/10 pain that keeps her from sleeping. On 23, she presented to the  ED with similar complaints, and bedside I&D of the felon was performed. She was discharged with keflex and bactrim, but she reports only receiving the keflex. Patient denies numbness and tingling, and she denies any residual deficits from prior stroke. Denies any other musculoskeletal pain or injuries. No known history of prior orthopedic injury or surgery.  They deny IV drug use.   They endorse tobacco use, pack per day hx >10 years.   They endorse alcohol use, 1 drink per week          Past Medical History:   Diagnosis Date    Encounter for blood transfusion     4 per patient report (as of 2021)    Hx of ischemic right ITZ stroke 2017    Iron deficiency anemia     Tobacco use disorder 2021    Uterine fibroid        Past Surgical History:   Procedure Laterality Date     SECTION      x2    tubal ligation         Review of patient's allergies indicates:  No Known  Allergies    No current facility-administered medications for this encounter.     Current Outpatient Medications   Medication Sig    amoxicillin (AMOXIL) 500 MG capsule amoxicillin 500 mg capsule    aspirin 81 MG Chew Take 1 tablet (81 mg total) by mouth once daily.    atorvastatin (LIPITOR) 40 MG tablet Take 1 tablet (40 mg total) by mouth once daily. (Patient not taking: Reported on 12/13/2021)    cephALEXin (KEFLEX) 500 MG capsule Take 1 capsule (500 mg total) by mouth 4 (four) times daily. for 7 days    clarithromycin (BIAXIN) 500 MG tablet clarithromycin 500 mg tablet    clindamycin (CLEOCIN) 150 MG capsule Take 3 capsules (450 mg total) by mouth 3 (three) times daily. for 7 days    ferrous sulfate 325 (65 FE) MG EC tablet Take 1 tablet (325 mg total) by mouth 2 (two) times daily.    HYDROcodone-acetaminophen (NORCO) 5-325 mg per tablet Take 1 tablet by mouth every 6 (six) hours as needed for Pain.    nicotine (NICODERM CQ) 21 mg/24 hr Place 1 patch onto the skin once daily.    omeprazole (PRILOSEC) 20 MG capsule Take 1 capsule (20 mg total) by mouth 2 (two) times a day. for 10 days    sulfamethoxazole-trimethoprim 800-160mg (BACTRIM DS) 800-160 mg Tab Take 1 tablet by mouth 2 (two) times daily. for 7 days     Family History       Problem Relation (Age of Onset)    Anemia Mother    Myasthenia gravis Child, Child    Stroke Mother    Throat cancer Father          Tobacco Use    Smoking status: Every Day     Packs/day: 1.00     Years: 20.00     Pack years: 20.00     Types: Cigarettes     Start date: 2003    Smokeless tobacco: Never   Substance and Sexual Activity    Alcohol use: Not Currently     Alcohol/week: 1.0 standard drink     Types: 1 Shots of liquor per week     Comment: Quit in 2020    Drug use: No    Sexual activity: Yes     Partners: Male     Review of Systems   Constitutional: Negative for chills and fever.   HENT:  Negative for sore throat.    Cardiovascular:  Negative for chest pain.  "       She states that she has had chest pain long ago from anxiety, but she denies current chest pain.   Respiratory:  Negative for shortness of breath.    Skin:         Endorses swelling and erythema of the right distal middle finger.   Musculoskeletal:  Negative for joint pain, muscle weakness and stiffness.   Gastrointestinal:  Negative for abdominal pain.   Genitourinary:  Negative for dysuria.   Neurological:  Negative for numbness and sensory change.   Objective:     Vital Signs (Most Recent):  Temp: 98.4 °F (36.9 °C) (01/16/23 1842)  Pulse: 106 (01/16/23 1842)  Resp: 18 (01/16/23 1842)  BP: (!) 141/89 (01/16/23 1842)  SpO2: 100 % (01/16/23 1842)   Vital Signs (24h Range):  Temp:  [98.4 °F (36.9 °C)] 98.4 °F (36.9 °C)  Pulse:  [106] 106  Resp:  [18] 18  SpO2:  [100 %] 100 %  BP: (141)/(89) 141/89     Weight: 72.6 kg (160 lb)  Height: 5' 4" (162.6 cm)  Body mass index is 27.46 kg/m².    No intake or output data in the 24 hours ending 01/16/23 2137    Ortho/SPM Exam  Gen:  Mild distress, well-developed, well nourished.  CV:  Peripherally well-perfused. 2+ radial pulses, symmetric.  Respiratory:  Normal respiratory effort. No accessory muscle use.   Alert. Oriented to person, place, time, and situation.        MSK:  R Upper extremity  Inspection  - Skin intact throughout, no open wounds  - Swelling and erythema present over distal phalanx of long finger, healing wounds from prior I&D   Palpation  - TTP over distal phalanx long finger  - Otherwise, no TTP over flexor tendons of the fingers  Range of motion  - AROM and PROM of the shoulder, elbow, wrist, and hand intact  Stability  - No evidence of joint dislocation or abnormal laxity   Neurovascular  - AIN/PIN/Radial/Median/Ulnar Nerves assessed in isolation without deficit  - Able to give thumbs up, make "OK" sign, cross IF/LF, abduct/adduct fingers, make fist  - SILT throughout  - Compartments soft  - Radial artery palpated  - Capillary Refill <3s  - Muscle " tone normal    0/4 [place] Signs    Procedure Note: Right Long Finger Felon Irrigation and Debridement  Risks, benefits, and alternatives to treatment was explained to patient at length. Patient verbalized their understanding and gave consent to proceed. Time out was performed and patient name, , site, and procedure were confirmed. 10 cc of 1% lidocaine was used for digital block of right long finger. Skin was sterilely prepared with betadine. Sterile field was prepped around the surgical site. Skin incision was made over ulnar aspect of distal long finger with scalpel. Incision was taken down bluntly with a hemostat to fluid collection. Purulent, white colored fluid was expressed. Fluid sample was obtained and sent for analysis and cultures. Incision was irrigated with 1 L of saline mixed with betadiene. Sterile packing was placed within the wound. Soft dressings were applied. Patient encouraged to keep extremity elevated. Patient tolerated the procedure well. Blood loss was minimal.      Significant Labs: All pertinent labs within the past 24 hours have been reviewed.    Significant Imaging: X-Ray: I have reviewed all pertinent results/findings and my personal findings are:  Radiograph of the right hand demonstrates soft tissue swelling without acute fracture or dislocation.    Assessment/Plan:     Felon of finger of right hand  Patti Hernandez is a 47 y.o. female who presents to the ED with re-occurrence of right long finger felon of distal phalanx.    - I&D performed at bedside, see procedure note for more detail  - Concern for anaerobic infection based on quality and odor of purulent material, recommended DC with course of clindamycin for MRSA and anaerobic coverage  - Hand staff messaged to reach out for follow up to remove packing and assess progress.             AMBROSE ARCHULETA MD  Orthopedics  [facility]

## 2023-01-17 NOTE — ED PROVIDER NOTES
Encounter Date: 2023       History     Chief Complaint   Patient presents with    Hand Pain     Had r middle finger lanced 2d ago , more pain and swelling     The history is provided by the patient and medical records. No  was used.     Patti Hernandez is a 47 y.o. female with medical history of ALO, Uterine Fibroids, R ischemia ITZ stroke on ASA presenting to the ED with the chief complaint of right middle finger pain.     Reports developing RMF distal phalanx pain and swelling about 5 days ago after getting acrylic nails placed. Seen in the ED and underwent I&D of RMF 3 days ago. Discharged on Bactrim, Keflex, Norco. Reports only taking 1 antibiotic every 6 hours and does not think she is taking the Bactrim. Reports continued swelling and pain to her finger which prompted her ED visit. Denies history of DM, skin infections, immunosuppression. No fever, chest pain, SOB, abdominal pain, vomiting, urinary or bowel movement changes.     Review of patient's allergies indicates:  No Known Allergies  Past Medical History:   Diagnosis Date    Encounter for blood transfusion     4 per patient report (as of 2021)    Hx of ischemic right ITZ stroke 2017    Iron deficiency anemia     Tobacco use disorder 2021    Uterine fibroid      Past Surgical History:   Procedure Laterality Date     SECTION      x2    tubal ligation       Family History   Problem Relation Age of Onset    Anemia Mother     Stroke Mother     Throat cancer Father     Myasthenia gravis Child     Myasthenia gravis Child      Social History     Tobacco Use    Smoking status: Every Day     Packs/day: 1.00     Years: 20.00     Pack years: 20.00     Types: Cigarettes     Start date:     Smokeless tobacco: Never   Substance Use Topics    Alcohol use: Not Currently     Alcohol/week: 1.0 standard drink     Types: 1 Shots of liquor per week     Comment: Quit in     Drug use: No     Review of Systems    Musculoskeletal:  Positive for arthralgias.     Physical Exam     Initial Vitals [01/16/23 1842]   BP Pulse Resp Temp SpO2   (!) 141/89 106 18 98.4 °F (36.9 °C) 100 %      MAP       --         Physical Exam    Constitutional: She appears well-developed and well-nourished. She is not diaphoretic. No distress.   HENT:   Head: Normocephalic and atraumatic.   Mouth/Throat: Oropharynx is clear and moist. No oropharyngeal exudate.   Eyes: Conjunctivae and EOM are normal. Pupils are equal, round, and reactive to light. No scleral icterus.   Neck: Neck supple.   Normal range of motion.  Cardiovascular:  Normal rate and regular rhythm.           Pulmonary/Chest: Breath sounds normal. No respiratory distress. She has no wheezes.   Abdominal: Abdomen is soft. She exhibits no distension. There is no abdominal tenderness. There is no rebound.   Musculoskeletal:         General: No tenderness or edema. Normal range of motion.      Cervical back: Normal range of motion and neck supple.      Comments: Orange acrylic nails. R middle finger distal phalanx swelling and TTP. Scabs over medial and lateral aspects from prior I&D. Intact DIP and PIP ROM. No streaking. No expressible discharge.     Neurological: She is alert and oriented to person, place, and time. She has normal strength. No sensory deficit.   Skin: Skin is warm and dry. No rash noted. No erythema.   Psychiatric: She has a normal mood and affect.       Right middle finger:      ED Course   Procedures  Labs Reviewed   POCT GLUCOSE - Abnormal; Notable for the following components:       Result Value    POCT Glucose 159 (*)     All other components within normal limits   GRAM STAIN   CULTURE, ANAEROBIC   CULTURE, AEROBIC  (SPECIFY SOURCE)   AFB CULTURE & SMEAR   CULTURE, FUNGUS          Imaging Results    None          Medications   LIDOcaine HCL 10 mg/ml (1%) injection 10 mL (10 mLs Infiltration Given 1/16/23 2000)     Medical Decision Making:   History:   Old Medical  Records: I decided to obtain old medical records.  Old Records Summarized: records from clinic visits and records from previous admission(s).  Clinical Tests:   Lab Tests: Ordered and Reviewed  Radiological Study: Reviewed     APC / Resident Notes:   47 y.o. female with medical history of ALO, Uterine Fibroids, R ischemia ITZ stroke on ASA, Tobacco use presenting to the ED c/o continued R middle finger pain and swelling. Underwent I&D 3 days ago in the ED. Discharged with Keflex and Bactrim, but only has been taking Keflex. DDx includes but not limited to Felon, herpetic daniel, cellulitis, abscess, septic arthritis, tenosynovitis. Discussed with Orthopedics and they will come see the patient. X-ray yesterday reviewed without evidence of foreign body.     Ortho consulted and performed I&D at bedside. Wound cultures obtained. Outpatient follow-up with hand clinic appropriate for reassessment and packing removal. RX for Clindamycin provided. Patient expresses understanding and agreeable to the plan. Return to ED precautions given for new, worsening, or concerning symptoms. I have discussed the care of this patient with my supervising physician.                        Clinical Impression:   Final diagnoses:  [L03.011] Felon of finger of right hand (Primary)        ED Disposition Condition    Discharge Stable          ED Prescriptions       Medication Sig Dispense Start Date End Date Auth. Provider    clindamycin (CLEOCIN) 150 MG capsule Take 3 capsules (450 mg total) by mouth 3 (three) times daily. for 7 days 63 capsule 1/16/2023 1/23/2023 John Berry PA-C          Follow-up Information       Follow up With Specialties Details Why Contact Info Additional Information    [facility]   [address], [address]  [facility] [address] [phone] [facility] - Main Building, 5th Floor Please park in [facility] and take [address]rium elevator             John  AZRA Berry PA-C  01/17/23 0041

## 2023-01-17 NOTE — TELEPHONE ENCOUNTER
Left detailed VM for patient to return the call in regards to getting her scheduled for tomorrow so that her packing can be removed per the ER. Per Dr. Velasquez the patient can be seen as she was on Pseudo Call when they saw the patient in the ER for right felon finger. She will be scheduled with Hermelinda Guillen on tomorrow as she has an opening. Waiting for patient to return the call. I will try and reach her again.    Tio Jenkins MD  [facility] Staff; Judi Velasquez MD  [facility],     Mrs. Hernandez has a 5 day history of distal long finger swelling consistent with a felon. She underwent I&D on the 13th, and DC with keflex and bactrim. She only took the keflex and was unaware she was prescribed bactrim.     We performed another I&D on her tonight, and she tolerated it well. We sent for cultures, and discharged her with clinda due to concern for anaerobic bacteria. Packing was placed in the wound.     She needs f/u in 2 days for packing removal and re-evaluation with Ashley or another appropriate provider.

## 2023-01-17 NOTE — TELEPHONE ENCOUNTER
Was seen in ED yesterday for swollen finger and it was lanced. Now she is in so much pain and the hand is burning.  Reason for Disposition   [1] Recent medical visit within 24 hours AND [2] NEW symptom AND [3] that sounds mild    Additional Information   Negative: Shock suspected (e.g., cold/pale/clammy skin, too weak to stand, low BP, rapid pulse)   Negative: Difficult to awaken or acting confused (e.g., disoriented, slurred speech)   Negative: Sounds like a life-threatening emergency to the triager   Negative: Recent (within last 24 hours) medical visit for an injury   Negative: Recent surgery or surgical procedure   Negative: Recent discharge from the hospital   Negative: Asthma attack diagnosed recently   Negative: Flu (influenza) diagnosed recently   Negative: Ear infection (otitis media, middle ear infection) diagnosed recently   Negative: Ear infection (otitis externa, swimmer's ear) diagnosed recently   Negative: [1] Sinus infection AND [2] taking an antibiotic   Negative: Skin infection (cellulitis) diagnosed recently   Negative: Strep throat (strep pharyngitis) diagnosed recently   Negative: Strep throat test result   Negative: Threatened miscarriage (threatened ) recently diagnosed   Negative: Urine infection (FEMALE; cystitis, pyelonephritis, urethritis) diagnosed recently   Negative: Urine infection (MALE; cystitis, pyelonephritis, prostatitis, epididymitis, orchitis, urethritis) diagnosed recently   Negative: Taking antibiotic for other infection   Negative: More than 24 hours since medical visit   Negative: [1] Drinking very little AND [2] dehydration suspected (e.g., no urine > 12 hours, very dry mouth, very lightheaded)   Negative: Patient sounds very sick or weak to the triager   Negative: Fever > 104 F (40 C)   Negative: [1] SEVERE pain (e.g., excruciating, pain scale 8-10) AND [2] not improved after pain medications   Negative: [1] Recent medical visit within 24  hours AND [2] condition / symptoms WORSE   Negative: [1] Recent medical visit within 24 hours AND [2] NEW symptom AND [3] that could be serious   Negative: [1] Caller has URGENT question (includes prescribed medication questions) AND [2] triager unable to answer question   Negative: [1] Recent medical visit within 24 hours AND [2] new-onset of fever AND [3] doctor (or NP/PA) said to call if this occurred   Negative: [1] Caller has NON-URGENT question (includes prescribed medication questions) AND [2] triager unable to answer   Negative: [1] Recent medical visit within 24 hours AND [2] condition / symptoms SAME (unchanged) AND [3] caller has additional questions triager can answer   Negative: [1] Recent medical visit within 24 hours AND [2] condition / symptoms BETTER (improving) AND [3] caller has additional questions triager can answer    Protocols used: Recent Medical Visit for Illness Follow-up Call-A-

## 2023-01-17 NOTE — ASSESSMENT & PLAN NOTE
Patti Hernandez is a 47 y.o. female who presents to the ED with re-occurrence of right long finger felon of distal phalanx.    - I&D performed at bedside, see procedure note for more detail  - Concern for anaerobic infection based on quality and odor of purulent material, recommended DC with course of clindamycin for MRSA and anaerobic coverage  - Hand staff messaged to reach out for follow up to remove packing and assess progress.

## 2023-01-17 NOTE — HPI
Patti Hernandez is a 47 y.o. female with PMH significant for right sided ITZ stroke in 10/2021, iron deficiency anemia, and uterine fibroids presenting with 5 day history of worsening pain and swelling of the right distal middle finger. Patient states that she had acrylic nails placed last week, and her pain began shortly after that. She states that it's a sharp 10/10 pain that keeps her from sleeping. On 01/13/23, she presented to the  ED with similar complaints, and bedside I&D of the felon was performed. She was discharged with keflex and bactrim, but she reports only receiving the keflex. Patient denies numbness and tingling, and she denies any residual deficits from prior stroke. Denies any other musculoskeletal pain or injuries. No known history of prior orthopedic injury or surgery.  They deny IV drug use.   They endorse tobacco use, pack per day hx >10 years.   They endorse alcohol use, 1 drink per week

## 2023-01-17 NOTE — ED TRIAGE NOTES
Patti Hernandez, a 47 y.o. female presents to the ED w/ complaint of hand pain       Triage note:Patient states that she was seen 2 days ago in ER for pain and swelling to the finger of right hand. Patient states they tried to drain it and she was sent home on antibiotics. Patient states the pain and swelling have become worse.  Chief Complaint   Patient presents with    Hand Pain     Had r middle finger lanced 2d ago , more pain and swelling     Review of patient's allergies indicates:  No Known Allergies  Past Medical History:   Diagnosis Date    Encounter for blood transfusion     4 per patient report (as of Feb 2021)    Hx of ischemic right ITZ stroke 7/1/2017    Iron deficiency anemia     Tobacco use disorder 2/12/2021    Uterine fibroid

## 2023-01-17 NOTE — DISCHARGE INSTRUCTIONS
Take the prescribed Clindamycin as directed for your finger infection. Continue your [place]xe at home for pain.     Follow-up with our Orthopedic clinic for further management. Use the below contact information to obtain an appointment.     Return to the emergency room for new, worsening, or concerning symptoms.     Future Appointments   Date Time Provider Department Center   1/24/2023 10:15 AM Adam Lopez MD [facility]

## 2023-01-17 NOTE — SUBJECTIVE & OBJECTIVE
Past Medical History:   Diagnosis Date    Encounter for blood transfusion     4 per patient report (as of 2021)    Hx of ischemic right ITZ stroke 2017    Iron deficiency anemia     Tobacco use disorder 2021    Uterine fibroid        Past Surgical History:   Procedure Laterality Date     SECTION      x2    tubal ligation         Review of patient's allergies indicates:  No Known Allergies    No current facility-administered medications for this encounter.     Current Outpatient Medications   Medication Sig    amoxicillin (AMOXIL) 500 MG capsule amoxicillin 500 mg capsule    aspirin 81 MG Chew Take 1 tablet (81 mg total) by mouth once daily.    atorvastatin (LIPITOR) 40 MG tablet Take 1 tablet (40 mg total) by mouth once daily. (Patient not taking: Reported on 2021)    cephALEXin (KEFLEX) 500 MG capsule Take 1 capsule (500 mg total) by mouth 4 (four) times daily. for 7 days    clarithromycin (BIAXIN) 500 MG tablet clarithromycin 500 mg tablet    clindamycin (CLEOCIN) 150 MG capsule Take 3 capsules (450 mg total) by mouth 3 (three) times daily. for 7 days    ferrous sulfate 325 (65 FE) MG EC tablet Take 1 tablet (325 mg total) by mouth 2 (two) times daily.    HYDROcodone-acetaminophen (NORCO) 5-325 mg per tablet Take 1 tablet by mouth every 6 (six) hours as needed for Pain.    nicotine (NICODERM CQ) 21 mg/24 hr Place 1 patch onto the skin once daily.    omeprazole (PRILOSEC) 20 MG capsule Take 1 capsule (20 mg total) by mouth 2 (two) times a day. for 10 days    sulfamethoxazole-trimethoprim 800-160mg (BACTRIM DS) 800-160 mg Tab Take 1 tablet by mouth 2 (two) times daily. for 7 days     Family History       Problem Relation (Age of Onset)    Anemia Mother    Myasthenia gravis Child, Child    Stroke Mother    Throat cancer Father          Tobacco Use    Smoking status: Every Day     Packs/day: 1.00     Years: 20.00     Pack years: 20.00     Types: Cigarettes     Start date:     Smokeless  "tobacco: Never   Substance and Sexual Activity    Alcohol use: Not Currently     Alcohol/week: 1.0 standard drink     Types: 1 Shots of liquor per week     Comment: Quit in 2020    Drug use: No    Sexual activity: Yes     Partners: Male     Review of Systems   Constitutional: Negative for chills and fever.   HENT:  Negative for sore throat.    Cardiovascular:  Negative for chest pain.        She states that she has had chest pain long ago from anxiety, but she denies current chest pain.   Respiratory:  Negative for shortness of breath.    Skin:         Endorses swelling and erythema of the right distal middle finger.   Musculoskeletal:  Negative for joint pain, muscle weakness and stiffness.   Gastrointestinal:  Negative for abdominal pain.   Genitourinary:  Negative for dysuria.   Neurological:  Negative for numbness and sensory change.   Objective:     Vital Signs (Most Recent):  Temp: 98.4 °F (36.9 °C) (01/16/23 1842)  Pulse: 106 (01/16/23 1842)  Resp: 18 (01/16/23 1842)  BP: (!) 141/89 (01/16/23 1842)  SpO2: 100 % (01/16/23 1842)   Vital Signs (24h Range):  Temp:  [98.4 °F (36.9 °C)] 98.4 °F (36.9 °C)  Pulse:  [106] 106  Resp:  [18] 18  SpO2:  [100 %] 100 %  BP: (141)/(89) 141/89     Weight: 72.6 kg (160 lb)  Height: 5' 4" (162.6 cm)  Body mass index is 27.46 kg/m².    No intake or output data in the 24 hours ending 01/16/23 2137    Ortho/SPM Exam  Gen:  Mild distress, well-developed, well nourished.  CV:  Peripherally well-perfused. 2+ radial pulses, symmetric.  Respiratory:  Normal respiratory effort. No accessory muscle use.   Alert. Oriented to person, place, time, and situation.        MSK:  R Upper extremity  Inspection  - Skin intact throughout, no open wounds  - Swelling and erythema present over distal phalanx of long finger, healing wounds from prior I&D   Palpation  - TTP over distal phalanx long finger  - Otherwise, no TTP over flexor tendons of the fingers  Range of motion  - AROM and PROM of the " "shoulder, elbow, wrist, and hand intact  Stability  - No evidence of joint dislocation or abnormal laxity   Neurovascular  - AIN/PIN/Radial/Median/Ulnar Nerves assessed in isolation without deficit  - Able to give thumbs up, make "OK" sign, cross IF/LF, abduct/adduct fingers, make fist  - SILT throughout  - Compartments soft  - Radial artery palpated  - Capillary Refill <3s  - Muscle tone normal    0/4 [place] Signs    Procedure Note: Right Long Finger Felon Irrigation and Debridement  Risks, benefits, and alternatives to treatment was explained to patient at length. Patient verbalized their understanding and gave consent to proceed. Time out was performed and patient name, , site, and procedure were confirmed. 10 cc of 1% lidocaine was used for digital block of right long finger. Skin was sterilely prepared with betadine. Sterile field was prepped around the surgical site. Skin incision was made over ulnar aspect of distal long finger with scalpel. Incision was taken down bluntly with a hemostat to fluid collection. Purulent, white colored fluid was expressed. Fluid sample was obtained and sent for analysis and cultures. Incision was irrigated with 1 L of saline mixed with betadiene. Sterile packing was placed within the wound. Soft dressings were applied. Patient encouraged to keep extremity elevated. Patient tolerated the procedure well. Blood loss was minimal.      Significant Labs: All pertinent labs within the past 24 hours have been reviewed.    Significant Imaging: X-Ray: I have reviewed all pertinent results/findings and my personal findings are:  Radiograph of the right hand demonstrates soft tissue swelling without acute fracture or dislocation.  "

## 2023-01-17 NOTE — TELEPHONE ENCOUNTER
Spoke to patient and scheduled her for tomorrow 1/18/2023 at 1:00 p.m. with Hermelinda Guillen as an ER f/u per Dr. Velasquez.    MD SINCERE Cantor Staff; Judi Velasquez MD  [facility],     Mrs. Hernandez has a 5 day history of distal long finger swelling consistent with a felon. She underwent I&D on the 13th, and DC with keflex and bactrim. She only took the keflex and was unaware she was prescribed bactrim.     We performed another I&D on her tonight, and she tolerated it well. We sent for cultures, and discharged her with clinda due to concern for anaerobic bacteria. Packing was placed in the wound.     She needs f/u in 2 days for packing removal and re-evaluation

## 2023-01-18 ENCOUNTER — OFFICE VISIT (OUTPATIENT)
Dept: ORTHOPEDICS | Facility: CLINIC | Age: 48
End: 2023-01-18
Payer: MEDICAID

## 2023-01-18 ENCOUNTER — TELEPHONE (OUTPATIENT)
Dept: INFECTIOUS DISEASES | Facility: CLINIC | Age: 48
End: 2023-01-18
Payer: MEDICAID

## 2023-01-18 VITALS
BODY MASS INDEX: 27.31 KG/M2 | HEART RATE: 105 BPM | WEIGHT: 160 LBS | HEIGHT: 64 IN | DIASTOLIC BLOOD PRESSURE: 84 MMHG | SYSTOLIC BLOOD PRESSURE: 122 MMHG

## 2023-01-18 DIAGNOSIS — L03.019 FELON OF FINGER: Primary | ICD-10-CM

## 2023-01-18 PROCEDURE — 99024 PR POST-OP FOLLOW-UP VISIT: ICD-10-PCS | Mod: ,,, | Performed by: PHYSICIAN ASSISTANT

## 2023-01-18 PROCEDURE — 99999 PR PBB SHADOW E&M-EST. PATIENT-LVL IV: CPT | Mod: PBBFAC,,, | Performed by: PHYSICIAN ASSISTANT

## 2023-01-18 PROCEDURE — 1159F MED LIST DOCD IN RCRD: CPT | Mod: CPTII,,, | Performed by: PHYSICIAN ASSISTANT

## 2023-01-18 PROCEDURE — 3008F BODY MASS INDEX DOCD: CPT | Mod: CPTII,,, | Performed by: PHYSICIAN ASSISTANT

## 2023-01-18 PROCEDURE — 99024 POSTOP FOLLOW-UP VISIT: CPT | Mod: ,,, | Performed by: PHYSICIAN ASSISTANT

## 2023-01-18 PROCEDURE — 99999 PR PBB SHADOW E&M-EST. PATIENT-LVL IV: ICD-10-PCS | Mod: PBBFAC,,, | Performed by: PHYSICIAN ASSISTANT

## 2023-01-18 PROCEDURE — 3074F SYST BP LT 130 MM HG: CPT | Mod: CPTII,,, | Performed by: PHYSICIAN ASSISTANT

## 2023-01-18 PROCEDURE — 99214 OFFICE O/P EST MOD 30 MIN: CPT | Mod: PBBFAC | Performed by: PHYSICIAN ASSISTANT

## 2023-01-18 PROCEDURE — 1160F PR REVIEW ALL MEDS BY PRESCRIBER/CLIN PHARMACIST DOCUMENTED: ICD-10-PCS | Mod: CPTII,,, | Performed by: PHYSICIAN ASSISTANT

## 2023-01-18 PROCEDURE — 1159F PR MEDICATION LIST DOCUMENTED IN MEDICAL RECORD: ICD-10-PCS | Mod: CPTII,,, | Performed by: PHYSICIAN ASSISTANT

## 2023-01-18 PROCEDURE — 3079F DIAST BP 80-89 MM HG: CPT | Mod: CPTII,,, | Performed by: PHYSICIAN ASSISTANT

## 2023-01-18 PROCEDURE — 1160F RVW MEDS BY RX/DR IN RCRD: CPT | Mod: CPTII,,, | Performed by: PHYSICIAN ASSISTANT

## 2023-01-18 PROCEDURE — 3074F PR MOST RECENT SYSTOLIC BLOOD PRESSURE < 130 MM HG: ICD-10-PCS | Mod: CPTII,,, | Performed by: PHYSICIAN ASSISTANT

## 2023-01-18 PROCEDURE — 3079F PR MOST RECENT DIASTOLIC BLOOD PRESSURE 80-89 MM HG: ICD-10-PCS | Mod: CPTII,,, | Performed by: PHYSICIAN ASSISTANT

## 2023-01-18 PROCEDURE — 3008F PR BODY MASS INDEX (BMI) DOCUMENTED: ICD-10-PCS | Mod: CPTII,,, | Performed by: PHYSICIAN ASSISTANT

## 2023-01-18 RX ORDER — TRAMADOL HYDROCHLORIDE 50 MG/1
50 TABLET ORAL EVERY 6 HOURS PRN
Qty: 12 TABLET | Refills: 0 | Status: SHIPPED | OUTPATIENT
Start: 2023-01-18 | End: 2023-01-18

## 2023-01-18 RX ORDER — TRAMADOL HYDROCHLORIDE 50 MG/1
50 TABLET ORAL EVERY 6 HOURS PRN
Qty: 12 TABLET | Refills: 0 | Status: ON HOLD | OUTPATIENT
Start: 2023-01-18 | End: 2023-01-22 | Stop reason: HOSPADM

## 2023-01-18 NOTE — TELEPHONE ENCOUNTER
Called patient and left message on her voicemail stating that NP Priscilla Jones is still out on maternity leave, however if patient would like to be seen sooner, a different provider may be available. Left number for patient to call back. ----- Message from Ella Mills LPN sent at 1/18/2023  4:04 PM CST -----  Regarding: Sooner Appointment  Good Afternoon,    This patient has an appointment in March however the provider is requesting the patient to be seen sooner. Are you able to assist with scheduling a sooner appointment.      Thanks,  Ella

## 2023-01-19 LAB — BACTERIA SPEC AEROBE CULT: ABNORMAL

## 2023-01-20 ENCOUNTER — OFFICE VISIT (OUTPATIENT)
Dept: ORTHOPEDICS | Facility: CLINIC | Age: 48
End: 2023-01-20
Payer: MEDICAID

## 2023-01-20 ENCOUNTER — HOSPITAL ENCOUNTER (OUTPATIENT)
Facility: HOSPITAL | Age: 48
Discharge: HOME | End: 2023-01-22
Attending: EMERGENCY MEDICINE | Admitting: ORTHOPAEDIC SURGERY
Payer: MEDICAID

## 2023-01-20 ENCOUNTER — DOCUMENTATION ONLY (OUTPATIENT)
Dept: ORTHOPEDICS | Facility: CLINIC | Age: 48
End: 2023-01-20

## 2023-01-20 VITALS — HEIGHT: 64 IN | WEIGHT: 160 LBS | BODY MASS INDEX: 27.31 KG/M2

## 2023-01-20 DIAGNOSIS — L03.011 FELON OF FINGER OF RIGHT HAND: Primary | ICD-10-CM

## 2023-01-20 DIAGNOSIS — L03.019 FELON OF FINGER: Primary | ICD-10-CM

## 2023-01-20 LAB
ALBUMIN SERPL BCP-MCNC: 3.5 G/DL (ref 3.5–5.2)
ALP SERPL-CCNC: 84 U/L (ref 55–135)
ALT SERPL W/O P-5'-P-CCNC: 23 U/L (ref 10–44)
ANION GAP SERPL CALC-SCNC: 7 MMOL/L (ref 8–16)
AST SERPL-CCNC: 19 U/L (ref 10–40)
B-HCG UR QL: NEGATIVE
BASOPHILS # BLD AUTO: 0.02 K/UL (ref 0–0.2)
BASOPHILS NFR BLD: 0.3 % (ref 0–1.9)
BILIRUB SERPL-MCNC: 0.2 MG/DL (ref 0.1–1)
BUN SERPL-MCNC: 18 MG/DL (ref 6–20)
CALCIUM SERPL-MCNC: 9.6 MG/DL (ref 8.7–10.5)
CHLORIDE SERPL-SCNC: 106 MMOL/L (ref 95–110)
CO2 SERPL-SCNC: 24 MMOL/L (ref 23–29)
CREAT SERPL-MCNC: 0.8 MG/DL (ref 0.5–1.4)
CRP SERPL-MCNC: 3.5 MG/L (ref 0–8.2)
CTP QC/QA: YES
DIFFERENTIAL METHOD: ABNORMAL
EOSINOPHIL # BLD AUTO: 0.1 K/UL (ref 0–0.5)
EOSINOPHIL NFR BLD: 1.5 % (ref 0–8)
ERYTHROCYTE [DISTWIDTH] IN BLOOD BY AUTOMATED COUNT: 26.7 % (ref 11.5–14.5)
ERYTHROCYTE [SEDIMENTATION RATE] IN BLOOD BY PHOTOMETRIC METHOD: 95 MM/HR (ref 0–36)
EST. GFR  (NO RACE VARIABLE): >60 ML/MIN/1.73 M^2
GLUCOSE SERPL-MCNC: 103 MG/DL (ref 70–110)
HCT VFR BLD AUTO: 27.3 % (ref 37–48.5)
HGB BLD-MCNC: 7.8 G/DL (ref 12–16)
IMM GRANULOCYTES # BLD AUTO: 0.02 K/UL (ref 0–0.04)
IMM GRANULOCYTES NFR BLD AUTO: 0.3 % (ref 0–0.5)
LYMPHOCYTES # BLD AUTO: 2.5 K/UL (ref 1–4.8)
LYMPHOCYTES NFR BLD: 32.1 % (ref 18–48)
MCH RBC QN AUTO: 21 PG (ref 27–31)
MCHC RBC AUTO-ENTMCNC: 28.6 G/DL (ref 32–36)
MCV RBC AUTO: 73 FL (ref 82–98)
MONOCYTES # BLD AUTO: 0.5 K/UL (ref 0.3–1)
MONOCYTES NFR BLD: 5.7 % (ref 4–15)
NEUTROPHILS # BLD AUTO: 4.8 K/UL (ref 1.8–7.7)
NEUTROPHILS NFR BLD: 60.1 % (ref 38–73)
NRBC BLD-RTO: 0 /100 WBC
PLATELET # BLD AUTO: 252 K/UL (ref 150–450)
PMV BLD AUTO: ABNORMAL FL (ref 9.2–12.9)
POTASSIUM SERPL-SCNC: 4.1 MMOL/L (ref 3.5–5.1)
PROT SERPL-MCNC: 7.4 G/DL (ref 6–8.4)
RBC # BLD AUTO: 3.72 M/UL (ref 4–5.4)
SODIUM SERPL-SCNC: 137 MMOL/L (ref 136–145)
WBC # BLD AUTO: 7.91 K/UL (ref 3.9–12.7)

## 2023-01-20 PROCEDURE — 99214 OFFICE O/P EST MOD 30 MIN: CPT | Mod: S$PBB,,, | Performed by: PHYSICIAN ASSISTANT

## 2023-01-20 PROCEDURE — 99285 EMERGENCY DEPT VISIT HI MDM: CPT | Mod: FS,,, | Performed by: EMERGENCY MEDICINE

## 2023-01-20 PROCEDURE — 25000003 PHARM REV CODE 250

## 2023-01-20 PROCEDURE — 80053 COMPREHEN METABOLIC PANEL: CPT | Performed by: PHYSICIAN ASSISTANT

## 2023-01-20 PROCEDURE — 85025 COMPLETE CBC W/AUTO DIFF WBC: CPT | Performed by: PHYSICIAN ASSISTANT

## 2023-01-20 PROCEDURE — 99999 PR PBB SHADOW E&M-EST. PATIENT-LVL III: CPT | Mod: PBBFAC,,, | Performed by: PHYSICIAN ASSISTANT

## 2023-01-20 PROCEDURE — 86140 C-REACTIVE PROTEIN: CPT | Performed by: PHYSICIAN ASSISTANT

## 2023-01-20 PROCEDURE — 99285 PR EMERGENCY DEPT VISIT,LEVEL V: ICD-10-PCS | Mod: FS,,, | Performed by: EMERGENCY MEDICINE

## 2023-01-20 PROCEDURE — 63600175 PHARM REV CODE 636 W HCPCS

## 2023-01-20 PROCEDURE — 99285 INCISION AND DRAINAGE: ICD-10-PCS | Mod: ,,, | Performed by: PHYSICIAN ASSISTANT

## 2023-01-20 PROCEDURE — 96366 THER/PROPH/DIAG IV INF ADDON: CPT | Mod: 59

## 2023-01-20 PROCEDURE — 96375 TX/PRO/DX INJ NEW DRUG ADDON: CPT

## 2023-01-20 PROCEDURE — 3008F BODY MASS INDEX DOCD: CPT | Mod: CPTII,,, | Performed by: PHYSICIAN ASSISTANT

## 2023-01-20 PROCEDURE — 1159F MED LIST DOCD IN RCRD: CPT | Mod: CPTII,,, | Performed by: PHYSICIAN ASSISTANT

## 2023-01-20 PROCEDURE — 99285 EMERGENCY DEPT VISIT HI MDM: CPT | Mod: 27,25

## 2023-01-20 PROCEDURE — 99999 PR PBB SHADOW E&M-EST. PATIENT-LVL III: ICD-10-PCS | Mod: PBBFAC,,, | Performed by: PHYSICIAN ASSISTANT

## 2023-01-20 PROCEDURE — 3008F PR BODY MASS INDEX (BMI) DOCUMENTED: ICD-10-PCS | Mod: CPTII,,, | Performed by: PHYSICIAN ASSISTANT

## 2023-01-20 PROCEDURE — 96366 THER/PROPH/DIAG IV INF ADDON: CPT

## 2023-01-20 PROCEDURE — 96365 THER/PROPH/DIAG IV INF INIT: CPT

## 2023-01-20 PROCEDURE — G0378 HOSPITAL OBSERVATION PER HR: HCPCS

## 2023-01-20 PROCEDURE — 81025 URINE PREGNANCY TEST: CPT

## 2023-01-20 PROCEDURE — 63600175 PHARM REV CODE 636 W HCPCS: Performed by: PHYSICIAN ASSISTANT

## 2023-01-20 PROCEDURE — 99213 OFFICE O/P EST LOW 20 MIN: CPT | Mod: PBBFAC | Performed by: PHYSICIAN ASSISTANT

## 2023-01-20 PROCEDURE — 26011 DRAINAGE OF FINGER ABSCESS: CPT | Mod: F7

## 2023-01-20 PROCEDURE — 1159F PR MEDICATION LIST DOCUMENTED IN MEDICAL RECORD: ICD-10-PCS | Mod: CPTII,,, | Performed by: PHYSICIAN ASSISTANT

## 2023-01-20 PROCEDURE — 99214 PR OFFICE/OUTPT VISIT, EST, LEVL IV, 30-39 MIN: ICD-10-PCS | Mod: S$PBB,,, | Performed by: PHYSICIAN ASSISTANT

## 2023-01-20 PROCEDURE — 85652 RBC SED RATE AUTOMATED: CPT | Performed by: PHYSICIAN ASSISTANT

## 2023-01-20 PROCEDURE — 99285 EMERGENCY DEPT VISIT HI MDM: CPT | Mod: ,,, | Performed by: PHYSICIAN ASSISTANT

## 2023-01-20 RX ORDER — LIDOCAINE HYDROCHLORIDE 10 MG/ML
1 INJECTION INFILTRATION; PERINEURAL ONCE
Status: DISCONTINUED | OUTPATIENT
Start: 2023-01-20 | End: 2023-01-20

## 2023-01-20 RX ORDER — SUCRALFATE 1 G/10ML
1 SUSPENSION ORAL EVERY 6 HOURS
Status: DISCONTINUED | OUTPATIENT
Start: 2023-01-20 | End: 2023-01-22 | Stop reason: HOSPADM

## 2023-01-20 RX ORDER — TALC
6 POWDER (GRAM) TOPICAL NIGHTLY PRN
Status: DISCONTINUED | OUTPATIENT
Start: 2023-01-20 | End: 2023-01-22 | Stop reason: HOSPADM

## 2023-01-20 RX ORDER — ATORVASTATIN CALCIUM 40 MG/1
40 TABLET, FILM COATED ORAL DAILY
Status: DISCONTINUED | OUTPATIENT
Start: 2023-01-21 | End: 2023-01-22 | Stop reason: HOSPADM

## 2023-01-20 RX ORDER — NAPROXEN SODIUM 220 MG/1
81 TABLET, FILM COATED ORAL DAILY
Status: DISCONTINUED | OUTPATIENT
Start: 2023-01-21 | End: 2023-01-22 | Stop reason: HOSPADM

## 2023-01-20 RX ORDER — IBUPROFEN 400 MG/1
400 TABLET ORAL EVERY 4 HOURS PRN
Status: DISCONTINUED | OUTPATIENT
Start: 2023-01-20 | End: 2023-01-22 | Stop reason: HOSPADM

## 2023-01-20 RX ORDER — LANOLIN ALCOHOL/MO/W.PET/CERES
1 CREAM (GRAM) TOPICAL 2 TIMES DAILY
Status: DISCONTINUED | OUTPATIENT
Start: 2023-01-20 | End: 2023-01-22 | Stop reason: HOSPADM

## 2023-01-20 RX ORDER — PANTOPRAZOLE SODIUM 40 MG/1
40 TABLET, DELAYED RELEASE ORAL DAILY
Status: DISCONTINUED | OUTPATIENT
Start: 2023-01-21 | End: 2023-01-22 | Stop reason: HOSPADM

## 2023-01-20 RX ORDER — METHOCARBAMOL 750 MG/1
750 TABLET, FILM COATED ORAL 3 TIMES DAILY
Status: DISCONTINUED | OUTPATIENT
Start: 2023-01-20 | End: 2023-01-22 | Stop reason: HOSPADM

## 2023-01-20 RX ORDER — SODIUM CHLORIDE 0.9 % (FLUSH) 0.9 %
10 SYRINGE (ML) INJECTION
Status: DISCONTINUED | OUTPATIENT
Start: 2023-01-20 | End: 2023-01-22 | Stop reason: HOSPADM

## 2023-01-20 RX ORDER — HYDROCODONE BITARTRATE AND ACETAMINOPHEN 5; 325 MG/1; MG/1
1 TABLET ORAL EVERY 4 HOURS PRN
Status: DISCONTINUED | OUTPATIENT
Start: 2023-01-20 | End: 2023-01-22 | Stop reason: HOSPADM

## 2023-01-20 RX ORDER — ONDANSETRON 8 MG/1
8 TABLET, ORALLY DISINTEGRATING ORAL EVERY 8 HOURS PRN
Status: DISCONTINUED | OUTPATIENT
Start: 2023-01-20 | End: 2023-01-22 | Stop reason: HOSPADM

## 2023-01-20 RX ORDER — ACETAMINOPHEN 325 MG/1
650 TABLET ORAL 4 TIMES DAILY
Status: DISCONTINUED | OUTPATIENT
Start: 2023-01-20 | End: 2023-01-22 | Stop reason: HOSPADM

## 2023-01-20 RX ORDER — MORPHINE SULFATE 4 MG/ML
4 INJECTION, SOLUTION INTRAMUSCULAR; INTRAVENOUS
Status: COMPLETED | OUTPATIENT
Start: 2023-01-20 | End: 2023-01-20

## 2023-01-20 RX ORDER — MAG HYDROX/ALUMINUM HYD/SIMETH 200-200-20
30 SUSPENSION, ORAL (FINAL DOSE FORM) ORAL
Status: DISCONTINUED | OUTPATIENT
Start: 2023-01-20 | End: 2023-01-22 | Stop reason: HOSPADM

## 2023-01-20 RX ADMIN — ALUMINUM HYDROXIDE, MAGNESIUM HYDROXIDE, AND SIMETHICONE 30 ML: 200; 200; 20 SUSPENSION ORAL at 09:01

## 2023-01-20 RX ADMIN — HYDROCODONE BITARTRATE AND ACETAMINOPHEN 1 TABLET: 5; 325 TABLET ORAL at 06:01

## 2023-01-20 RX ADMIN — MORPHINE SULFATE 4 MG: 4 INJECTION INTRAVENOUS at 01:01

## 2023-01-20 RX ADMIN — HYDROCODONE BITARTRATE AND ACETAMINOPHEN 1 TABLET: 5; 325 TABLET ORAL at 10:01

## 2023-01-20 RX ADMIN — METHOCARBAMOL 750 MG: 750 TABLET, FILM COATED ORAL at 09:01

## 2023-01-20 RX ADMIN — PIPERACILLIN SODIUM AND TAZOBACTAM SODIUM 4.5 G: 4; .5 INJECTION, POWDER, LYOPHILIZED, FOR SOLUTION INTRAVENOUS at 04:01

## 2023-01-20 RX ADMIN — Medication 6 MG: at 08:01

## 2023-01-20 RX ADMIN — FERROUS SULFATE TAB 325 MG (65 MG ELEMENTAL FE) 1 EACH: 325 (65 FE) TAB at 09:01

## 2023-01-20 RX ADMIN — ALUMINUM HYDROXIDE, MAGNESIUM HYDROXIDE, AND SIMETHICONE 30 ML: 200; 200; 20 SUSPENSION ORAL at 05:01

## 2023-01-20 RX ADMIN — SUCRALFATE 1 G: 1 SUSPENSION ORAL at 05:01

## 2023-01-20 RX ADMIN — ACETAMINOPHEN 650 MG: 325 TABLET ORAL at 05:01

## 2023-01-20 RX ADMIN — ACETAMINOPHEN 650 MG: 325 TABLET ORAL at 09:01

## 2023-01-20 NOTE — HPI
47-year-old female with past medical history of previous CVA, uterine fibroids, menorrhagia Fe-deficient anemia, presenting to [facility] ED with referral from hand clinic with non-healing felon of the right third phalanx.     Patient initially noted swelling 1/11 and was seen in [facility] ED 1/13 where bedside I&D was performed by ED and she was discharged on Keflex and Bactrim but reports only receiving Keflex. She presented back to [facility] ED 1/16 where she was seen by orthopedics at and underwent right long finger felon irrigation and debridement, cultures grew Bacteroides and Klebsiella, and she was sent home with on Clindamycin. Pt presented to hand clinic for ED follow up 1/18 at which time packing was removed, purulent discharge was noted with packing removal, some additional purulence was expressed following packing removal. Clindamycin was extended. Patient was seen at hand clinic today and referred to [facility] ED for further management due to poor impovement of felon.     Patient reports compliance with antibiotics and has kept finger covered with soft dressing since her recent clinic visit.     Denies chemotherapy, radiation therapy, or immunosuppressant use. She ambulates without a walker at baseline. She denies anticoagulation use at baseline.

## 2023-01-20 NOTE — SUBJECTIVE & OBJECTIVE
Past Medical History:   Diagnosis Date    Encounter for blood transfusion     4 per patient report (as of 2021)    Hx of ischemic right ITZ stroke 2017    Iron deficiency anemia     Tobacco use disorder 2021    Uterine fibroid        Past Surgical History:   Procedure Laterality Date     SECTION      x2    tubal ligation         Review of patient's allergies indicates:  No Known Allergies    Current Facility-Administered Medications   Medication    LIDOcaine HCL 10 mg/ml (1%) injection 1 mL     Current Outpatient Medications   Medication Sig    cephALEXin (KEFLEX) 500 MG capsule Take 1 capsule (500 mg total) by mouth 4 (four) times daily. for 7 days    clindamycin (CLEOCIN) 150 MG capsule Take 3 capsules (450 mg total) by mouth 3 (three) times daily. for 7 days    amoxicillin (AMOXIL) 500 MG capsule amoxicillin 500 mg capsule    aspirin 81 MG Chew Take 1 tablet (81 mg total) by mouth once daily.    atorvastatin (LIPITOR) 40 MG tablet Take 1 tablet (40 mg total) by mouth once daily. (Patient not taking: Reported on 2021)    clarithromycin (BIAXIN) 500 MG tablet clarithromycin 500 mg tablet    ferrous sulfate 325 (65 FE) MG EC tablet Take 1 tablet (325 mg total) by mouth 2 (two) times daily.    HYDROcodone-acetaminophen (NORCO) 5-325 mg per tablet Take 1 tablet by mouth every 6 (six) hours as needed for Pain.    nicotine (NICODERM CQ) 21 mg/24 hr Place 1 patch onto the skin once daily.    omeprazole (PRILOSEC) 20 MG capsule Take 1 capsule (20 mg total) by mouth 2 (two) times a day. for 10 days    sulfamethoxazole-trimethoprim 800-160mg (BACTRIM DS) 800-160 mg Tab Take 1 tablet by mouth 2 (two) times daily. for 7 days    traMADoL (ULTRAM) 50 mg tablet Take 1 tablet (50 mg total) by mouth every 6 (six) hours as needed for Pain.     Family History       Problem Relation (Age of Onset)    Anemia Mother    Myasthenia gravis Child, Child    Stroke Mother    Throat cancer Father          Tobacco  "Use    Smoking status: Every Day     Packs/day: 1.00     Years: 20.00     Pack years: 20.00     Types: Cigarettes     Start date: 2003    Smokeless tobacco: Never   Substance and Sexual Activity    Alcohol use: Not Currently     Alcohol/week: 1.0 standard drink     Types: 1 Shots of liquor per week     Comment: Quit in 2020    Drug use: No    Sexual activity: Yes     Partners: Male     Review of Systems   Constitutional: Negative for chills, decreased appetite, fever and night sweats.   HENT:  Negative for sore throat.    Eyes:  Negative for redness and visual disturbance.   Cardiovascular:  Negative for chest pain, palpitations and syncope.   Skin:  Positive for color change and poor wound healing. Negative for itching, rash and suspicious lesions.   Musculoskeletal:  Negative for arthritis, joint pain, joint swelling and stiffness.   Gastrointestinal:  Negative for bloating, abdominal pain, nausea and vomiting.   Genitourinary:  Negative for flank pain and urgency.   Neurological:  Negative for dizziness, headaches, numbness, paresthesias, vertigo and weakness.   Psychiatric/Behavioral:  Negative for altered mental status and depression.    Objective:     Vital Signs (Most Recent):  Temp: 98.8 °F (37.1 °C) (01/20/23 1052)  Pulse: 70 (01/20/23 1319)  Resp: 16 (01/20/23 1319)  BP: 130/84 (01/20/23 1319)  SpO2: 100 % (01/20/23 1319) Vital Signs (24h Range):  Temp:  [98.8 °F (37.1 °C)] 98.8 °F (37.1 °C)  Pulse:  [70-91] 70  Resp:  [16] 16  SpO2:  [99 %-100 %] 100 %  BP: (130)/(73-84) 130/84     Weight: 72.6 kg (160 lb)  Height: 5' 4" (162.6 cm)  Body mass index is 27.46 kg/m².    No intake or output data in the 24 hours ending 01/20/23 1557    General    Nursing note and vitals reviewed.        Gen:  No acute distress, well-developed, well nourished.  CV:  Peripherally well-perfused. 2+ radial pulses, symmetric.  Respiratory:  Normal respiratory effort. No accessory muscle use.   Head/Neck:  Normocephalic.  " "Atraumatic. Sclera anicteric. TM. Neck supple.  Neuro: CN 2-12 grossly intact. No FND. Awake. Alert. Oriented to person, place, time, and situation.  Abdomen: Soft, NTND.      MSK:  Right Upper extremity  Inspection  - Skin lesion over pulp of 3rd digit with swelling and pulp draining  - No ecchymosis, erythema, or signs of cellulitis  Palpation  - NonTTP throughout, no palpable abnormality   Range of motion  - AROM and PROM of the shoulder, elbow, wrist, and hand intact including 3rd digit without pain  Stability  - No evidence of joint dislocation or abnormal laxity   Neurovascular  - AIN/PIN/Radial/Median/Ulnar Nerves assessed in isolation without deficit  - Able to give thumbs up, make "OK" sign, cross IF/LF, abduct/adduct fingers, make fist  - SILT throughout  - Compartments soft  - Radial artery palpated  - Capillary Refill <3s  - Muscle tone normal    Left Upper extremity  Inspection  - Skin intact throughout, no open wounds  - No swelling  - No ecchymosis, erythema, or signs of cellulitis  Palpation  - NonTTP throughout, no palpable abnormality   Range of motion  - AROM and PROM of the shoulder, elbow, wrist, and hand intact  Stability  - No evidence of joint dislocation or abnormal laxity   Neurovascular  - AIN/PIN/Radial/Median/Ulnar Nerves assessed in isolation without deficit  - Able to give thumbs up, make "OK" sign, cross IF/LF, abduct/adduct fingers, make fist  - SILT throughout  - Compartments soft  - Radial artery palpated  - Capillary Refill <3s  - Muscle tone normal    Right Lower Extremity  Inspection  - Skin intact throughout, no open wounds  - No swelling  - No ecchymosis, erythema, or signs of cellulitis  Palpation  - NonTTP throughout, no palpable abnormality    Range of motion  - AROM and PROM of the hip, knee, ankle, and foot intact  Stability  - No evidence of joint dislocation or abnormal laxity,    Neurovascular  - TA/EHL/Gastroc/FHL assessed in isolation without deficit  - SILT " throughout  - Compartments soft  - DP palpated   - Capillary Refill <3s  - Negative Log roll  - Negative Stinchfield  - Muscle tone normal    Spine/pelvis/axial body:  No tenderness to palpation of cervical, thoracic, or lumbar spine  No pain with compression of pelvis  No chest wall or abdominal tenderness  No decubitus ulcers    Left Lower Extremity  Inspection  - Skin intact throughout, no open wounds  - No swelling  - No ecchymosis, erythema, or signs of cellulitis  Palpation  - NonTTP throughout, no palpable abnormality   Range of motion  - AROM and PROM of the hip, knee, ankle, and foot intact  Stability  - No evidence of joint dislocation or abnormal laxity,   Neurovascular  - TA/EHL/Gastroc/FHL assessed in isolation without deficit  - SILT throughout  - Compartments soft  - DP palpated   - Capillary Refill <3s  - Negative Log roll  - Negative Stinchfield  - Muscle tone normal    Spine/pelvis/axial body:  No tenderness to palpation of cervical, thoracic, or lumbar spine  No pain with compression of pelvis  No chest wall or abdominal tenderness  No decubitus ulcers  Muscle tone normal      Significant Labs: All pertinent labs within the past 24 hours have been reviewed.    Significant Imaging: I have reviewed and interpreted all pertinent imaging results/findings.

## 2023-01-20 NOTE — PROGRESS NOTES
Abx recs from infectious disease:     Is skin and soft tissue then augmentin 500mg po tid if renal function allows and if bone cipro 750mg po bid and flagyl 500mg po tid (make sure no cipro contarindications - ie hx of aneurysm or prolonged qt or taking other wt prolonging agents)     Pt currently in the ED, will defer abx selection to current providers at this time.

## 2023-01-20 NOTE — ED NOTES
Patient identifiers verified and correct for  MS Hernandez  C/C:  Swelling to right hand middle finger SEE NN  APPEARANCE: awake and alert in NAD. PAIN  10/10  SKIN: warm, dry, swelling to right hand middle finger with white swelling to outer top finger, small amount drainage  MUSCULOSKELETAL: Patient moving all extremities spontaneously, no obvious swelling or deformities noted. Ambulates independently.  RESPIRATORY: Denies shortness of breath.Respirations unlabored.   CARDIAC: Denies CP, 2+ distal pulses; no peripheral edema  ABDOMEN: S/ND/NT, Denies nausea  : voids spontaneously, denies difficulty  Neurologic: AAO x 4; follows commands equal strength in all extremities; denies numbness/tingling. Denies dizziness  Denies new weakness

## 2023-01-20 NOTE — PROCEDURES
"Patti Hernandez is a 47 y.o. female patient.    Temp: 98.8 °F (37.1 °C) (01/20/23 1052)  Pulse: 70 (01/20/23 1319)  Resp: 16 (01/20/23 1319)  BP: 130/84 (01/20/23 1319)  SpO2: 100 % (01/20/23 1319)  Weight: 72.6 kg (160 lb) (01/20/23 1052)  Height: 5' 4" (162.6 cm) (01/20/23 1052)       Incision and Drainage    Date/Time: 1/20/2023 4:07 PM  Location procedure was performed: [facility] EMERGENCY DEPARTMENT  Performed by: Bruce Toney MD  Authorized by: Bruce Toney MD   Consent Done: Yes  Consent: Verbal consent obtained.  Consent given by: patient  Patient understanding: patient states understanding of the procedure being performed  Patient consent: the patient's understanding of the procedure matches consent given  Procedure consent: procedure consent matches procedure scheduled  Relevant documents: relevant documents present and verified  Test results: test results available and properly labeled  Site marked: the operative site was marked  Imaging studies: imaging studies available  Type: abscess  Body area: upper extremity  Location details: right long finger  Anesthesia: digital block    Anesthesia:  Local Anesthetic: lidocaine 1% without epinephrine  Anesthetic total: 5 mL  Scalpel size: 11  Incision type: single straight  Complexity: simple  Drainage: purulent  Wound treatment: deloculation, incision, wound left open, drainage, expression of material and wound packed  Packing material: 1/2 in iodoform gauze  Complications: No  Estimated blood loss (mL): 4  Specimens: No  Implants: No        1/20/2023    "

## 2023-01-20 NOTE — ASSESSMENT & PLAN NOTE
Pt with right long finger felon onset 1/11 s/p I&D by ED staff 1/13 and s/p repeat I&D with cultures by orthopedics 1/16 with persistent felon, active purulent drainage.     Sent to [facility] ED from hand clinic for repeat irrigation and debridement by orthopedics (see procedure note)  New soft dressing applied to finger, packing placed  Admission for IV abx, observation for clinical improvement  Ortho will follow clinical exam closely, pull packing Sunday  NPO midnight prior to packing pull as a precaution  If showing improvement on Sunday, can transition to PO abx per ID recs    Dispo: Pending clinical improvement, may be okay for discharge Sunday if finger looks better when packing is pulled    Patient was scheduled for follow up in hand clinic next week

## 2023-01-20 NOTE — ED PROVIDER NOTES
"Encounter Date: 2023       History     Chief Complaint   Patient presents with    Finger Pain     Right hand third digit--per ortho note, "Pt with right long finger felon onset  s/p I&D by ED staff  and s/p repeat I&D with cultures by orthopedics  with persistent felon, active purulent drainage. Treatment options discussed. Recommend patient present back to main [place] ED for repeat irrigation and debridement by orthopedics, possible IV abx. New soft dressing applied to finger."     47-year-old female with past medical history of iron deficiency anemia, uterine fibroid, CVA who presents to the emergency department with chief complaint of finger pain.  She initially started with finger pain and swelling around .  She was evaluated in the emergency department on .  Incision and drainage performed.  Discharged with Keflex and Bactrim, but only took Keflex.  She came back to the emergency department on .  Orthopedic resident formed incision and drainage.  She was discharged with Keflex.  She presented to the hand clinic on .  Additional purulence was expressed and her clindamycin was extended.  She presented to the hand clinic again today, and was sent to the emergency department for further evaluation. Has been compliant with clindamycin. She reports that her pain has mildly improved, from 10/10 to 8/10 for the last two days. Worse with palpation and movement. No fever, chills, other systemic symptoms.     Review of patient's allergies indicates:  No Known Allergies  Past Medical History:   Diagnosis Date    Encounter for blood transfusion     4 per patient report (as of 2021)    Hx of ischemic right ITZ stroke 2017    Iron deficiency anemia     Tobacco use disorder 2021    Uterine fibroid      Past Surgical History:   Procedure Laterality Date     SECTION      x2    tubal ligation       Family History   Problem Relation Age of Onset    Anemia Mother     Stroke " Mother     Throat cancer Father     Myasthenia gravis Child     Myasthenia gravis Child      Social History     Tobacco Use    Smoking status: Every Day     Packs/day: 1.00     Years: 20.00     Pack years: 20.00     Types: Cigarettes     Start date: 2003    Smokeless tobacco: Never   Substance Use Topics    Alcohol use: Not Currently     Alcohol/week: 1.0 standard drink     Types: 1 Shots of liquor per week     Comment: Quit in 2020    Drug use: No     Review of Systems   Constitutional:  Negative for fever.   HENT:  Negative for sore throat.    Respiratory:  Negative for shortness of breath.    Cardiovascular:  Negative for chest pain.   Gastrointestinal:  Negative for nausea.   Genitourinary:  Negative for dysuria.   Musculoskeletal:  Negative for back pain.   Skin:  Positive for wound. Negative for rash.   Neurological:  Negative for weakness.   Hematological:  Does not bruise/bleed easily.     Physical Exam     Initial Vitals [01/20/23 1052]   BP Pulse Resp Temp SpO2   130/73 91 16 98.8 °F (37.1 °C) 99 %      MAP       --         Physical Exam    Nursing note and vitals reviewed.  Constitutional: She appears well-developed and well-nourished. She is not diaphoretic. No distress.   HENT:   Head: Normocephalic and atraumatic.   Mouth/Throat: Oropharynx is clear and moist.   Eyes: Conjunctivae and EOM are normal. Pupils are equal, round, and reactive to light.   Neck: Neck supple.   Normal range of motion.  Cardiovascular:  Normal rate.           Pulmonary/Chest: No respiratory distress.   Abdominal: She exhibits no distension. There is no guarding.   Musculoskeletal:         General: Normal range of motion.      Cervical back: Normal range of motion and neck supple.      Comments: Open felon noted to the distal right third digit. Purulence on dressing. Full ROM of MCP, PIP joints. Decreased ROM at DIP joint. See images taken in clinic below.      Neurological: She is alert and oriented to person, place, and time.  She has normal strength. No cranial nerve deficit or sensory deficit. GCS score is 15. GCS eye subscore is 4. GCS verbal subscore is 5. GCS motor subscore is 6.   Skin: Skin is warm and dry. Capillary refill takes less than 2 seconds.   Psychiatric: She has a normal mood and affect. Her behavior is normal. Judgment and thought content normal.                 ED Course   Procedures  Labs Reviewed   CBC W/ AUTO DIFFERENTIAL - Abnormal; Notable for the following components:       Result Value    RBC 3.72 (*)     Hemoglobin 7.8 (*)     Hematocrit 27.3 (*)     MCV 73 (*)     MCH 21.0 (*)     MCHC 28.6 (*)     RDW 26.7 (*)     All other components within normal limits   COMPREHENSIVE METABOLIC PANEL - Abnormal; Notable for the following components:    Anion Gap 7 (*)     All other components within normal limits   SEDIMENTATION RATE - Abnormal; Notable for the following components:    Sed Rate 95 (*)     All other components within normal limits   C-REACTIVE PROTEIN   POCT URINE PREGNANCY          Imaging Results              X-Ray Finger 2 or More Views Right (Final result)  Result time 01/20/23 13:50:43      Final result by Juarez Christine MD (01/20/23 13:50:43)                   Impression:      Significant soft tissue edema and emphysema with questionable lucency in the 3rd distal phalangeal tuft suggestive of infectious process with early osteomyelitis difficult to exclude.      Electronically signed by: Juarez Christine MD  Date:    01/20/2023  Time:    13:50               Narrative:    EXAMINATION:  XR FINGER 2 OR MORE VIEWS RIGHT    CLINICAL HISTORY:  felon;    TECHNIQUE:  Three views of the right middle finger.    COMPARISON:  01/13/2023.    FINDINGS:  No acute displaced fracture.  No dislocation.  There is question minimal new lucency in the 3rd distal phalangeal tuft.  Significant soft tissue edema about the 30 digit, most pronounced distally.  Pocket of soft tissue air noted distal to the 3rd distal  phalangeal tuft.  No unexpected radiopaque foreign body.                                       Medications   piperacillin-tazobactam (ZOSYN) 4.5 g in dextrose 5 % in water (D5W) 5 % 100 mL IVPB (MB+) (4.5 g Intravenous New Bag 1/20/23 1645)   aspirin chewable tablet 81 mg (has no administration in time range)   atorvastatin tablet 40 mg (has no administration in time range)   ferrous sulfate tablet 1 each (has no administration in time range)   HYDROcodone-acetaminophen 5-325 mg per tablet 1 tablet (has no administration in time range)   pantoprazole EC tablet 40 mg (has no administration in time range)   sucralfate 100 mg/mL suspension 1 g (1 g Oral Given 1/20/23 1737)   aluminum-magnesium hydroxide-simethicone 200-200-20 mg/5 mL suspension 30 mL (30 mLs Oral Given 1/20/23 1736)   acetaminophen tablet 650 mg (650 mg Oral Given 1/20/23 1736)   methocarbamoL tablet 750 mg (has no administration in time range)   sodium chloride 0.9% flush 10 mL (has no administration in time range)   ondansetron disintegrating tablet 8 mg (has no administration in time range)   melatonin tablet 6 mg (has no administration in time range)   ibuprofen tablet 400 mg (has no administration in time range)   morphine injection 4 mg (4 mg Intravenous Given 1/20/23 1323)     Medical Decision Making:   History:   Old Medical Records: I decided to obtain old medical records.  Initial Assessment:   Emergent evaluation of a 47 y.o. female presenting to the emergency department complaining of right third pain and swelling that began on 1/11. Previously I&D x 2. On clindamycin. Sent from [facility] for I&D, admission. Patient is afebrile, hemodynamically stable, and non toxic appearing. Will order analgesia, labs, imaging, consult Orthopedic surgery.    Differential Diagnosis:   Differential diagnosis includes but isn't limited to cellulitis, abscess, felon, osteomyelitis, septic arthritis.  ED Management:  Patient is presenting with pain and swelling to  the right 3rd digit that began on 01/11.  She has had incision and drainage is performed.  She has been compliant with her prescribed clindamycin.  She said that her pain is somewhat improving, but the wound continues to drain.  Orthopedic surgery has evaluated the patient.  They have performed incision and drainage at bedside.   No severe metabolic or hematologic derangements.  Hemoglobin 7.8.  This is near patient's baseline.  ESR elevated.  CRP within normal limits.  X-ray with: Significant soft tissue edema and emphysema with questionable lucency in the 3rd distal phalangeal tuft suggestive of infectious process with early osteomyelitis difficult to exclude.     Orthopedic surgery will admit to their service for IV antibiotics.  Patient is agreeable.  All questions answered.  The patient's history, physical exam, and plan of care was discussed with and agreed upon with my supervising physician.                ED Course as of 01/20/23 1738 Fri Jan 20, 2023   1403 WBC: 7.91 [JM]   1403 Hemoglobin(!): 7.8 [JM]   1403 Hematocrit(!): 27.3 [JM]   1403 Platelets: 252 [JM]   1403 Sed Rate(!): 95 [JM]   1435 BUN: 18 [JM]   1435 Creatinine: 0.8 [JM]   1435 CRP: 3.5 [JM]      ED Course User Index  [JM] Ciera Mulligan PA-C                 Clinical Impression:   Final diagnoses:  [L03.011] Felon of finger of right hand (Primary)        ED Disposition Condition    Observation                 Ciera Mulligan PA-C  01/20/23 1738

## 2023-01-20 NOTE — CONSULTS
"[facility] - Emergency Dept  Orthopedics  Consult Note    Patient Name: Patti Hernandez  MRN: [id number]  Admission Date: 1/20/2023  Hospital Length of Stay: 0 days  Attending Provider: Alysia Craig MD  Primary Care Provider: Megha Brar NP        Inpatient consult to Orthopedic Surgery  Consult performed by: Chiki Villatoro MD  Consult ordered by: Ciera Mulligan PA-C        Subjective:     Principal Problem:<principal problem not specified>    Chief Complaint:   Chief Complaint   Patient presents with    Finger Pain     Right hand third digit--per ortho note, "Pt with right long finger felon onset 1/11 s/p I&D by ED staff 1/13 and s/p repeat I&D with cultures by orthopedics 1/16 with persistent felon, active purulent drainage. Treatment options discussed. Recommend patient present back to [facility] ED for repeat irrigation and debridement by orthopedics, possible IV abx. New soft dressing applied to finger."        HPI: 47-year-old female with past medical history of previous CVA, uterine fibroids, menorrhagia Fe-deficient anemia, presenting [place] [facility] [place] with referral from hand clinic with non-healing felon of the right third phalanx.     Patient initially noted swelling 1/11 and was seen in [facility] 1/13 where bedside I&D was performed by ED and she was discharged on Keflex and Bactrim but reports only receiving Keflex. She presented back to [facility] 1/16 where she was seen by orthopedics at and underwent right long finger felon irrigation and debridement, cultures grew Bacteroides and Klebsiella, and she was sent home with on Clindamycin. Pt presented to hand clinic for ED follow up 1/18 at which time packing was removed, purulent discharge was noted with packing removal, some additional purulence was expressed following packing removal. Clindamycin was extended. Patient was seen at hand clinic today and referred to [facility] for further management due to poor impovement of felon.     Patient reports compliance " with antibiotics and has kept finger covered with soft dressing since her recent clinic visit.     Denies chemotherapy, radiation therapy, or immunosuppressant use. She ambulates without a walker at baseline. She denies anticoagulation use at baseline.       Past Medical History:   Diagnosis Date    Encounter for blood transfusion     4 per patient report (as of 2021)    Hx of ischemic right ITZ stroke 2017    Iron deficiency anemia     Tobacco use disorder 2021    Uterine fibroid        Past Surgical History:   Procedure Laterality Date     SECTION      x2    tubal ligation         Review of patient's allergies indicates:  No Known Allergies    Current Facility-Administered Medications   Medication    LIDOcaine HCL 10 mg/ml (1%) injection 1 mL     Current Outpatient Medications   Medication Sig    cephALEXin (KEFLEX) 500 MG capsule Take 1 capsule (500 mg total) by mouth 4 (four) times daily. for 7 days    clindamycin (CLEOCIN) 150 MG capsule Take 3 capsules (450 mg total) by mouth 3 (three) times daily. for 7 days    amoxicillin (AMOXIL) 500 MG capsule amoxicillin 500 mg capsule    aspirin 81 MG Chew Take 1 tablet (81 mg total) by mouth once daily.    atorvastatin (LIPITOR) 40 MG tablet Take 1 tablet (40 mg total) by mouth once daily. (Patient not taking: Reported on 2021)    clarithromycin (BIAXIN) 500 MG tablet clarithromycin 500 mg tablet    ferrous sulfate 325 (65 FE) MG EC tablet Take 1 tablet (325 mg total) by mouth 2 (two) times daily.    HYDROcodone-acetaminophen (NORCO) 5-325 mg per tablet Take 1 tablet by mouth every 6 (six) hours as needed for Pain.    nicotine (NICODERM CQ) 21 mg/24 hr Place 1 patch onto the skin once daily.    omeprazole (PRILOSEC) 20 MG capsule Take 1 capsule (20 mg total) by mouth 2 (two) times a day. for 10 days    sulfamethoxazole-trimethoprim 800-160mg (BACTRIM DS) 800-160 mg Tab Take 1 tablet by mouth 2 (two) times daily. for 7 days  "   traMADoL (ULTRAM) 50 mg tablet Take 1 tablet (50 mg total) by mouth every 6 (six) hours as needed for Pain.     Family History       Problem Relation (Age of Onset)    Anemia Mother    Myasthenia gravis Child, Child    Stroke Mother    Throat cancer Father          Tobacco Use    Smoking status: Every Day     Packs/day: 1.00     Years: 20.00     Pack years: 20.00     Types: Cigarettes     Start date: 2003    Smokeless tobacco: Never   Substance and Sexual Activity    Alcohol use: Not Currently     Alcohol/week: 1.0 standard drink     Types: 1 Shots of liquor per week     Comment: Quit in 2020    Drug use: No    Sexual activity: Yes     Partners: Male     Review of Systems   Constitutional: Negative for chills, decreased appetite, fever and night sweats.   HENT:  Negative for sore throat.    Eyes:  Negative for redness and visual disturbance.   Cardiovascular:  Negative for chest pain, palpitations and syncope.   Skin:  Positive for color change and poor wound healing. Negative for itching, rash and suspicious lesions.   Musculoskeletal:  Negative for arthritis, joint pain, joint swelling and stiffness.   Gastrointestinal:  Negative for bloating, abdominal pain, nausea and vomiting.   Genitourinary:  Negative for flank pain and urgency.   Neurological:  Negative for dizziness, headaches, numbness, paresthesias, vertigo and weakness.   Psychiatric/Behavioral:  Negative for altered mental status and depression.    Objective:     Vital Signs (Most Recent):  Temp: 98.8 °F (37.1 °C) (01/20/23 1052)  Pulse: 70 (01/20/23 1319)  Resp: 16 (01/20/23 1319)  BP: 130/84 (01/20/23 1319)  SpO2: 100 % (01/20/23 1319) Vital Signs (24h Range):  Temp:  [98.8 °F (37.1 °C)] 98.8 °F (37.1 °C)  Pulse:  [70-91] 70  Resp:  [16] 16  SpO2:  [99 %-100 %] 100 %  BP: (130)/(73-84) 130/84     Weight: 72.6 kg (160 lb)  Height: 5' 4" (162.6 cm)  Body mass index is 27.46 kg/m².    No intake or output data in the 24 hours ending 01/20/23 " "1557    General    Nursing note and vitals reviewed.        Gen:  No acute distress, well-developed, well nourished.  CV:  Peripherally well-perfused. 2+ radial pulses, symmetric.  Respiratory:  Normal respiratory effort. No accessory muscle use.   Head/Neck:  Normocephalic.  Atraumatic. Sclera anicteric. TM. Neck supple.  Neuro: CN 2-12 grossly intact. No FND. Awake. Alert. Oriented to person, place, time, and situation.  Abdomen: Soft, NTND.      MSK:  Right Upper extremity  Inspection  - Skin lesion over pulp of 3rd digit with swelling and pulp draining  - No ecchymosis, erythema, or signs of cellulitis  Palpation  - NonTTP throughout, no palpable abnormality   Range of motion  - AROM and PROM of the shoulder, elbow, wrist, and hand intact including 3rd digit without pain  Stability  - No evidence of joint dislocation or abnormal laxity   Neurovascular  - AIN/PIN/Radial/Median/Ulnar Nerves assessed in isolation without deficit  - Able to give thumbs up, make "OK" sign, cross IF/LF, abduct/adduct fingers, make fist  - SILT throughout  - Compartments soft  - Radial artery palpated  - Capillary Refill <3s  - Muscle tone normal    Left Upper extremity  Inspection  - Skin intact throughout, no open wounds  - No swelling  - No ecchymosis, erythema, or signs of cellulitis  Palpation  - NonTTP throughout, no palpable abnormality   Range of motion  - AROM and PROM of the shoulder, elbow, wrist, and hand intact  Stability  - No evidence of joint dislocation or abnormal laxity   Neurovascular  - AIN/PIN/Radial/Median/Ulnar Nerves assessed in isolation without deficit  - Able to give thumbs up, make "OK" sign, cross IF/LF, abduct/adduct fingers, make fist  - SILT throughout  - Compartments soft  - Radial artery palpated  - Capillary Refill <3s  - Muscle tone normal    Right Lower Extremity  Inspection  - Skin intact throughout, no open wounds  - No swelling  - No ecchymosis, erythema, or signs of cellulitis  Palpation  - " NonTTP throughout, no palpable abnormality    Range of motion  - AROM and PROM of the hip, knee, ankle, and foot intact  Stability  - No evidence of joint dislocation or abnormal laxity,    Neurovascular  - TA/EHL/Gastroc/FHL assessed in isolation without deficit  - SILT throughout  - Compartments soft  - DP palpated   - Capillary Refill <3s  - Negative Log roll  - Negative Stinchfield  - Muscle tone normal    Spine/pelvis/axial body:  No tenderness to palpation of cervical, thoracic, or lumbar spine  No pain with compression of pelvis  No chest wall or abdominal tenderness  No decubitus ulcers    Left Lower Extremity  Inspection  - Skin intact throughout, no open wounds  - No swelling  - No ecchymosis, erythema, or signs of cellulitis  Palpation  - NonTTP throughout, no palpable abnormality   Range of motion  - AROM and PROM of the hip, knee, ankle, and foot intact  Stability  - No evidence of joint dislocation or abnormal laxity,   Neurovascular  - TA/EHL/Gastroc/FHL assessed in isolation without deficit  - SILT throughout  - Compartments soft  - DP palpated   - Capillary Refill <3s  - Negative Log roll  - Negative Stinchfield  - Muscle tone normal    Spine/pelvis/axial body:  No tenderness to palpation of cervical, thoracic, or lumbar spine  No pain with compression of pelvis  No chest wall or abdominal tenderness  No decubitus ulcers  Muscle tone normal      Significant Labs: All pertinent labs within the past 24 hours have been reviewed.    Significant Imaging: I have reviewed and interpreted all pertinent imaging results/findings.    Assessment/Plan:     Felon of finger of right hand  Pt with right long finger felon onset 1/11 s/p I&D by ED staff 1/13 and s/p repeat I&D with cultures by orthopedics 1/16 with persistent felon, active purulent drainage.     Sent to [facility] ED from hand clinic for repeat irrigation and debridement by orthopedics (see procedure note)  New soft dressing applied to finger,  packing placed  Admission for IV abx, observation for clinical improvement  Ortho will follow clinical exam closely, pull packing Sunday  NPO midnight prior to packing pull as a precaution  If showing improvement on Sunday, can transition to PO abx per ID recs    Dispo: Pending clinical improvement, may be okay for discharge Sunday if finger looks better when packing is pulled    Patient was scheduled for follow up in hand clinic next week             Chiki Villatoro MD  Orthopedics  [facility] - Emergency Dept

## 2023-01-20 NOTE — PROGRESS NOTES
Subjective:      Patient ID: Patti Hernandez is a 47 y.o. female.    Chief Complaint: Injury and Pain of the Right Hand    1/20/23   Pt presents for follow up right long finger felon. Symptom onset 1/11. Pt seen in ED 1/13, beside I&D performed by ED staff, d/c on Keflex and Bactrim but reports only received and took Keflex. She presented back to the ED 1/16, she was seen by orthopedics at this time and underwent right long finger felon irrigation and debridement, cultures obtained. D/c on Clindamycin. Pt presented to hand clinic for ED follow up 1/18 at which time packing was removed, purulent discharge was noted with packing removal, some additional purulence was expressed following packing removal. Clindamycin was extended. Infectious Disease referral placed, she was scheduled for an appt in March. She reports compliance with abx. She has kept finger covered with soft dressing since her recent clinic visit. She notes minimal improvement in pain. Cultures and sensitivities below.      Review of patient's allergies indicates:  No Known Allergies      Current Outpatient Medications   Medication Sig Dispense Refill    amoxicillin (AMOXIL) 500 MG capsule amoxicillin 500 mg capsule      cephALEXin (KEFLEX) 500 MG capsule Take 1 capsule (500 mg total) by mouth 4 (four) times daily. for 7 days 28 capsule 0    clarithromycin (BIAXIN) 500 MG tablet clarithromycin 500 mg tablet      clindamycin (CLEOCIN) 150 MG capsule Take 3 capsules (450 mg total) by mouth 3 (three) times daily. for 7 days 63 capsule 0    ferrous sulfate 325 (65 FE) MG EC tablet Take 1 tablet (325 mg total) by mouth 2 (two) times daily. 60 tablet 0    HYDROcodone-acetaminophen (NORCO) 5-325 mg per tablet Take 1 tablet by mouth every 6 (six) hours as needed for Pain. 12 tablet 0    nicotine (NICODERM CQ) 21 mg/24 hr Place 1 patch onto the skin once daily. 14 patch 0    sulfamethoxazole-trimethoprim 800-160mg (BACTRIM DS) 800-160 mg Tab Take 1 tablet by  "mouth 2 (two) times daily. for 7 days 14 tablet 0    traMADoL (ULTRAM) 50 mg tablet Take 1 tablet (50 mg total) by mouth every 6 (six) hours as needed for Pain. 12 tablet 0    aspirin 81 MG Chew Take 1 tablet (81 mg total) by mouth once daily. 30 tablet 11    atorvastatin (LIPITOR) 40 MG tablet Take 1 tablet (40 mg total) by mouth once daily. (Patient not taking: Reported on 2021) 90 tablet 3    omeprazole (PRILOSEC) 20 MG capsule Take 1 capsule (20 mg total) by mouth 2 (two) times a day. for 10 days 20 capsule 0     No current facility-administered medications for this visit.       Past Medical History:   Diagnosis Date    Encounter for blood transfusion     4 per patient report (as of 2021)    Hx of ischemic right ITZ stroke 2017    Iron deficiency anemia     Tobacco use disorder 2021    Uterine fibroid        Past Surgical History:   Procedure Laterality Date     SECTION      x2    tubal ligation           Review of Systems:  ROS:  Constitutional: no fever or chills  Skin: no rash or suspicious lesions  Musculoskeletal: See HPI.   Neurological: no headaches, lightheadedness, or dizziness. No numbness or tingling  Psychological/behavioral: no anxiety or depression      OBJECTIVE:     PHYSICAL EXAM:  Height: 5' 4" (162.6 cm) Weight: 72.6 kg (160 lb)  Vitals:    23 0906   Weight: 72.6 kg (160 lb)   Height: 5' 4" (1.626 m)   PainSc:   8       Vitals reviewed.  Constitutional: NAD. Patient appears well-developed and well-nourished.   HENT:   Head: Normocephalic and atraumatic.   Neck: Normal range of motion.   Cardiovascular: Normal rate.    Pulmonary/Chest: Effort normal. No respiratory distress.   Neurological: Patient is awake, alert, oriented.   Psychiatric: Patient has a normal mood and affect. Behavior is normal. Judgment and thought content normal.  Musculoskeletal:    Right long finger  Soft dressing removed, there is foul smelling purulent drainage on the bandage. There is " additional purulence expressed with light pressure to the distal palmar finger. Acrylic nail overlies nail plate. She has continued evidence of finger felon, distal finger with erythema, edema, warmth, fluctuance, and purulent drainage. Prior incision from I&D is remains open with no notable signs of healing. She remains tender to palpation throughout the distal long palmar finger. Decreased full finger flexion secondary to pain, edema. NVI.                  RADIOGRAPHS:  Right hand XRay, 1/13/2023  FINDINGS:  No fracture.  No malalignment.  Preserved bone density.  Preserved joint spaces.  No opaque soft tissue foreign body.  Soft tissue swelling dorsally at the level of the metacarpals and MCP articulations.     Impression:  As above    Comments: I have personally reviewed the imaging and I agree with the above radiologist's report.    MICROBIOLOGY:  Anaerobic Culture  Abnormal   BACTEROIDES UNIFORMIS   Moderate         Aerobic Bacterial Culture  Abnormal   KLEBSIELLA OXYTOCA   Few     Resulting Agency [facility]        Susceptibility     Klebsiella oxytoca     CULTURE, AEROBIC  (SPECIFY SOURCE)     Amox/K Clav'ate <=8/4 mcg/mL Sensitive     Amp/Sulbactam <=8/4 mcg/mL Sensitive     Ampicillin >16 mcg/mL Resistant     Cefazolin >16 mcg/mL Resistant     Cefepime <=2 mcg/mL Sensitive     Ceftriaxone <=1 mcg/mL Sensitive     Ciprofloxacin <=1 mcg/mL Sensitive     Ertapenem <=0.5 mcg/mL Sensitive     Gentamicin <=4 mcg/mL Sensitive     Levofloxacin <=2 mcg/mL Sensitive     Meropenem <=1 mcg/mL Sensitive     Piperacillin/Tazo <=16 mcg/mL Sensitive     Tetracycline <=4 mcg/mL Sensitive     Tobramycin <=4 mcg/mL Sensitive     Trimeth/Sulfa <=2/38 mcg/mL Sensitive                         ASSESSMENT/PLAN:   Patti was seen today for injury and pain.    Diagnoses and all orders for this visit:    Felon of finger       - We talked at length about the anatomy and pathophysiology of   Encounter Diagnosis   Name Primary?    Hair green  finger Yes     Pt with right long finger felon onset 1/11 s/p I&D by ED staff 1/13 and s/p repeat I&D with cultures by orthopedics 1/16 with persistent felon, active purulent drainage.     Treatment options discussed. Recommend patient present back to main campus ED for repeat irrigation and debridement by orthopedics, possible IV abx.   New soft dressing applied to finger   Will reach out to infectious disease for abx recommendations. Msg previously sent for sooner appt.  Patient was scheduled for follow up in hand clinic next week

## 2023-01-20 NOTE — ED NOTES
Patient states she has had swelling to right hand middle finger x 7 days, has been seen x 2 and lanced x 2 PTA, currently on Clindamycin Patient  in [facility] eating a large meal

## 2023-01-21 PROCEDURE — 25000003 PHARM REV CODE 250

## 2023-01-21 PROCEDURE — G0378 HOSPITAL OBSERVATION PER HR: HCPCS

## 2023-01-21 PROCEDURE — 63600175 PHARM REV CODE 636 W HCPCS

## 2023-01-21 PROCEDURE — 96366 THER/PROPH/DIAG IV INF ADDON: CPT

## 2023-01-21 RX ADMIN — SUCRALFATE 1 G: 1 SUSPENSION ORAL at 05:01

## 2023-01-21 RX ADMIN — Medication 6 MG: at 08:01

## 2023-01-21 RX ADMIN — ALUMINUM HYDROXIDE, MAGNESIUM HYDROXIDE, AND SIMETHICONE 30 ML: 200; 200; 20 SUSPENSION ORAL at 08:01

## 2023-01-21 RX ADMIN — FERROUS SULFATE TAB 325 MG (65 MG ELEMENTAL FE) 1 EACH: 325 (65 FE) TAB at 08:01

## 2023-01-21 RX ADMIN — ALUMINUM HYDROXIDE, MAGNESIUM HYDROXIDE, AND SIMETHICONE 30 ML: 200; 200; 20 SUSPENSION ORAL at 05:01

## 2023-01-21 RX ADMIN — PIPERACILLIN SODIUM AND TAZOBACTAM SODIUM 4.5 G: 4; .5 INJECTION, POWDER, LYOPHILIZED, FOR SOLUTION INTRAVENOUS at 08:01

## 2023-01-21 RX ADMIN — SUCRALFATE 1 G: 1 SUSPENSION ORAL at 11:01

## 2023-01-21 RX ADMIN — ACETAMINOPHEN 650 MG: 325 TABLET ORAL at 08:01

## 2023-01-21 RX ADMIN — SUCRALFATE 1 G: 1 SUSPENSION ORAL at 06:01

## 2023-01-21 RX ADMIN — PIPERACILLIN SODIUM AND TAZOBACTAM SODIUM 4.5 G: 4; .5 INJECTION, POWDER, LYOPHILIZED, FOR SOLUTION INTRAVENOUS at 05:01

## 2023-01-21 RX ADMIN — ATORVASTATIN CALCIUM 40 MG: 40 TABLET, FILM COATED ORAL at 08:01

## 2023-01-21 RX ADMIN — SUCRALFATE 1 G: 1 SUSPENSION ORAL at 12:01

## 2023-01-21 RX ADMIN — ALUMINUM HYDROXIDE, MAGNESIUM HYDROXIDE, AND SIMETHICONE 30 ML: 200; 200; 20 SUSPENSION ORAL at 07:01

## 2023-01-21 RX ADMIN — PIPERACILLIN SODIUM AND TAZOBACTAM SODIUM 4.5 G: 4; .5 INJECTION, POWDER, LYOPHILIZED, FOR SOLUTION INTRAVENOUS at 01:01

## 2023-01-21 RX ADMIN — ASPIRIN 81 MG: 81 TABLET, CHEWABLE ORAL at 08:01

## 2023-01-21 RX ADMIN — PANTOPRAZOLE SODIUM 40 MG: 40 TABLET, DELAYED RELEASE ORAL at 08:01

## 2023-01-21 RX ADMIN — ALUMINUM HYDROXIDE, MAGNESIUM HYDROXIDE, AND SIMETHICONE 30 ML: 200; 200; 20 SUSPENSION ORAL at 11:01

## 2023-01-21 RX ADMIN — IBUPROFEN 400 MG: 400 TABLET ORAL at 04:01

## 2023-01-21 RX ADMIN — METHOCARBAMOL 750 MG: 750 TABLET, FILM COATED ORAL at 08:01

## 2023-01-21 RX ADMIN — HYDROCODONE BITARTRATE AND ACETAMINOPHEN 1 TABLET: 5; 325 TABLET ORAL at 08:01

## 2023-01-21 RX ADMIN — METHOCARBAMOL 750 MG: 750 TABLET, FILM COATED ORAL at 02:01

## 2023-01-21 RX ADMIN — HYDROCODONE BITARTRATE AND ACETAMINOPHEN 1 TABLET: 5; 325 TABLET ORAL at 02:01

## 2023-01-21 RX ADMIN — HYDROCODONE BITARTRATE AND ACETAMINOPHEN 1 TABLET: 5; 325 TABLET ORAL at 04:01

## 2023-01-21 RX ADMIN — ACETAMINOPHEN 650 MG: 325 TABLET ORAL at 05:01

## 2023-01-21 NOTE — PROGRESS NOTES
Patient arrived to floor via stretcher. Patient is awake, alert, and oriented. Skin is warm and dry. Lungs clear in all fields. Abdomen is soft and nontender. Ambulates and voids. Fingers on right hand splinted, and painful to touch. No skin breakdown noted. Oriented to environment. Will continue to monitor.

## 2023-01-21 NOTE — PROGRESS NOTES
Nurses Note -- 4 Eyes      1/21/2023   1:09 AM      Skin assessed during: Admit      [x] No Pressure Injuries Present    [x]Prevention Measures Documented      [] Yes- Altered Skin Integrity Present or Discovered   [] LDA Added if Not in Epic (Describe Wound)   [] New Altered Skin Integrity was Present on Admit and Documented in LDA   [] Wound Image Taken    Wound Care Consulted? No    Attending Nurse:  Rachel Castelan RN     Second RN/Staff Member:  Shaun Dominguez RN

## 2023-01-22 VITALS
WEIGHT: 160 LBS | SYSTOLIC BLOOD PRESSURE: 181 MMHG | HEART RATE: 67 BPM | BODY MASS INDEX: 27.31 KG/M2 | RESPIRATION RATE: 18 BRPM | OXYGEN SATURATION: 100 % | HEIGHT: 64 IN | DIASTOLIC BLOOD PRESSURE: 86 MMHG | TEMPERATURE: 97 F

## 2023-01-22 PROCEDURE — 25000003 PHARM REV CODE 250

## 2023-01-22 PROCEDURE — 96366 THER/PROPH/DIAG IV INF ADDON: CPT | Mod: 59

## 2023-01-22 PROCEDURE — 99223 PR INITIAL HOSPITAL CARE,LEVL III: ICD-10-PCS | Mod: ,,, | Performed by: INTERNAL MEDICINE

## 2023-01-22 PROCEDURE — 99223 1ST HOSP IP/OBS HIGH 75: CPT | Mod: ,,, | Performed by: INTERNAL MEDICINE

## 2023-01-22 PROCEDURE — G0378 HOSPITAL OBSERVATION PER HR: HCPCS

## 2023-01-22 PROCEDURE — 63600175 PHARM REV CODE 636 W HCPCS

## 2023-01-22 PROCEDURE — 94761 N-INVAS EAR/PLS OXIMETRY MLT: CPT

## 2023-01-22 RX ORDER — IBUPROFEN 400 MG/1
400 TABLET ORAL EVERY 4 HOURS PRN
Qty: 60 TABLET | Refills: 0 | Status: SHIPPED | OUTPATIENT
Start: 2023-01-22

## 2023-01-22 RX ORDER — AMOXICILLIN AND CLAVULANATE POTASSIUM 875; 125 MG/1; MG/1
1 TABLET, FILM COATED ORAL EVERY 12 HOURS
Qty: 24 TABLET | Refills: 0 | Status: SHIPPED | OUTPATIENT
Start: 2023-01-22 | End: 2023-02-03

## 2023-01-22 RX ORDER — OXYCODONE HYDROCHLORIDE 5 MG/1
5 TABLET ORAL EVERY 6 HOURS PRN
Qty: 10 TABLET | Refills: 0 | Status: SHIPPED | OUTPATIENT
Start: 2023-01-22 | End: 2023-02-07

## 2023-01-22 RX ORDER — AMOXICILLIN AND CLAVULANATE POTASSIUM 875; 125 MG/1; MG/1
1 TABLET, FILM COATED ORAL EVERY 12 HOURS
Status: DISCONTINUED | OUTPATIENT
Start: 2023-01-22 | End: 2023-01-22 | Stop reason: HOSPADM

## 2023-01-22 RX ORDER — ACETAMINOPHEN 325 MG/1
650 TABLET ORAL 4 TIMES DAILY
Qty: 60 TABLET | Refills: 0 | Status: SHIPPED | OUTPATIENT
Start: 2023-01-22

## 2023-01-22 RX ADMIN — METHOCARBAMOL 750 MG: 750 TABLET, FILM COATED ORAL at 08:01

## 2023-01-22 RX ADMIN — SUCRALFATE 1 G: 1 SUSPENSION ORAL at 12:01

## 2023-01-22 RX ADMIN — HYDROCODONE BITARTRATE AND ACETAMINOPHEN 1 TABLET: 5; 325 TABLET ORAL at 05:01

## 2023-01-22 RX ADMIN — ASPIRIN 81 MG: 81 TABLET, CHEWABLE ORAL at 08:01

## 2023-01-22 RX ADMIN — SUCRALFATE 1 G: 1 SUSPENSION ORAL at 05:01

## 2023-01-22 RX ADMIN — PIPERACILLIN SODIUM AND TAZOBACTAM SODIUM 4.5 G: 4; .5 INJECTION, POWDER, LYOPHILIZED, FOR SOLUTION INTRAVENOUS at 12:01

## 2023-01-22 RX ADMIN — IBUPROFEN 400 MG: 400 TABLET ORAL at 04:01

## 2023-01-22 RX ADMIN — SUCRALFATE 1 G: 1 SUSPENSION ORAL at 11:01

## 2023-01-22 RX ADMIN — ACETAMINOPHEN 650 MG: 325 TABLET ORAL at 12:01

## 2023-01-22 RX ADMIN — HYDROCODONE BITARTRATE AND ACETAMINOPHEN 1 TABLET: 5; 325 TABLET ORAL at 11:01

## 2023-01-22 RX ADMIN — PANTOPRAZOLE SODIUM 40 MG: 40 TABLET, DELAYED RELEASE ORAL at 08:01

## 2023-01-22 RX ADMIN — ALUMINUM HYDROXIDE, MAGNESIUM HYDROXIDE, AND SIMETHICONE 30 ML: 200; 200; 20 SUSPENSION ORAL at 05:01

## 2023-01-22 RX ADMIN — PIPERACILLIN SODIUM AND TAZOBACTAM SODIUM 4.5 G: 4; .5 INJECTION, POWDER, LYOPHILIZED, FOR SOLUTION INTRAVENOUS at 10:01

## 2023-01-22 RX ADMIN — METHOCARBAMOL 750 MG: 750 TABLET, FILM COATED ORAL at 03:01

## 2023-01-22 RX ADMIN — ACETAMINOPHEN 650 MG: 325 TABLET ORAL at 08:01

## 2023-01-22 RX ADMIN — HYDROCODONE BITARTRATE AND ACETAMINOPHEN 1 TABLET: 5; 325 TABLET ORAL at 12:01

## 2023-01-22 RX ADMIN — ALUMINUM HYDROXIDE, MAGNESIUM HYDROXIDE, AND SIMETHICONE 30 ML: 200; 200; 20 SUSPENSION ORAL at 11:01

## 2023-01-22 RX ADMIN — FERROUS SULFATE TAB 325 MG (65 MG ELEMENTAL FE) 1 EACH: 325 (65 FE) TAB at 08:01

## 2023-01-22 RX ADMIN — ATORVASTATIN CALCIUM 40 MG: 40 TABLET, FILM COATED ORAL at 08:01

## 2023-01-22 NOTE — PROGRESS NOTES
"[facility]  Orthopedics  Progress Note    Patient Name: Patti Hernandez  MRN: [id number]  Admission Date: 1/20/2023  Hospital Length of Stay: 0 days  Attending Provider: Mohinder Herron MD  Primary Care Provider: Megha Brar NP    Subjective:     Principal Problem:Felon of finger of right hand    Principal Orthopedic Problem: same as above    Interval History: Patient seen and examined at bedside. NAEON. Packing pulled this am. Wound appearance improved from prior, minimal purulence. Okay for diet    Review of patient's allergies indicates:  No Known Allergies    Current Facility-Administered Medications   Medication    acetaminophen tablet 650 mg    aluminum-magnesium hydroxide-simethicone 200-200-20 mg/5 mL suspension 30 mL    aspirin chewable tablet 81 mg    atorvastatin tablet 40 mg    ferrous sulfate tablet 1 each    HYDROcodone-acetaminophen 5-325 mg per tablet 1 tablet    ibuprofen tablet 400 mg    melatonin tablet 6 mg    methocarbamoL tablet 750 mg    ondansetron disintegrating tablet 8 mg    pantoprazole EC tablet 40 mg    piperacillin-tazobactam (ZOSYN) 4.5 g in dextrose 5 % in water (D5W) 5 % 100 mL IVPB (MB+)    sodium chloride 0.9% flush 10 mL    sucralfate 100 mg/mL suspension 1 g     Objective:     Vital Signs (Most Recent):  Temp: 98.3 °F (36.8 °C) (01/22/23 0401)  Pulse: 78 (01/22/23 0401)  Resp: 16 (01/22/23 0528)  BP: 128/73 (01/22/23 0401)  SpO2: 100 % (01/22/23 0401) Vital Signs (24h Range):  Temp:  [97.5 °F (36.4 °C)-98.3 °F (36.8 °C)] 98.3 °F (36.8 °C)  Pulse:  [62-78] 78  Resp:  [16-18] 16  SpO2:  [95 %-100 %] 100 %  BP: (119-150)/(71-88) 128/73     Weight: 72.6 kg (160 lb)  Height: 5' 4" (162.6 cm)  Body mass index is 27.46 kg/m².    No intake or output data in the 24 hours ending 01/22/23 0646    Physical exam:  Gen:  No acute distress, well-developed, well nourished.  CV:  Peripherally well-perfused. 2+ radial pulses, symmetric.  Respiratory:  Normal respiratory " "effort. No accessory muscle use.   Head/Neck:  Normocephalic.  Atraumatic. Sclera anicteric. TM. Neck supple.  Neuro: CN 2-12 grossly intact. No FND. Awake. Alert. Oriented to person, place, time, and situation.  Abdomen: Soft, NTND.        MSK:  Right Upper extremity  Inspection  - Skin lesion over pulp of 3rd digit with swelling  - Appears well-decompressed, no expressible purulence  Palpation  - TTP over felon  Range of motion  - AROM and PROM of the shoulder, elbow, wrist, and hand intact including 3rd digit without pain  Stability  - No evidence of joint dislocation or abnormal laxity   Neurovascular  - AIN/PIN/Radial/Median/Ulnar Nerves assessed in isolation without deficit  - Able to give thumbs up, make "OK" sign, cross IF/LF, abduct/adduct fingers, make fist  - SILT throughout  - Compartments soft  - Radial artery palpated  - Capillary Refill <3s  - Muscle tone normal       Significant Labs: All pertinent labs within the past 24 hours have been reviewed.    Significant Imaging: I have reviewed all pertinent imaging results/findings.    Assessment/Plan:     * Felon of finger of right hand  Pt with right long finger felon onset 1/11 s/p I&D by ED staff 1/13 and s/p repeat I&D with cultures by orthopedics 1/16 with persistent felon, active purulent drainage. Sent to [facility] ED from hand clinic for repeat irrigation and debridement by orthopedics 1/20/22. Packing pulled 1/22/22. Improved appearance of clinical exam.    -Begin betadine soaks today  -Cont IV abx  -Following clinical exam  -If continues to improve, can transition to PO abx per ID recs    Dispo: Pending clinical improvement, anticipate dc with PO abx    Patient was scheduled for follow up in hand clinic next week           Chiki Villatoro MD  Orthopedics  [facility]  "

## 2023-01-22 NOTE — HOSPITAL COURSE
On 1/20/23, the patient arrived to the [facility] ED. Repeat I&D was performed for right long finger felon, without complication and the patient was admitted for IV antibiotics. The interim of the hospital stay from arrival on the floor up to discharge has been uncomplicated. The patient has tolerated regular diet.  The patient's pain has been controlled using a multimodal approach. Currently, the patient's pain is well controlled on an oral regimen.  The patient has been voiding without difficulty. Wound packing was pulled and the wound was clinically improved with proper antibiotic coverage. The patient was transitioned to appropriate PO antibiotics per ID recommendations.  Currently, the patient's progress is sufficient to allow the them to be discharged to home safely.  The patient agrees with this assessment and desires a discharge today.

## 2023-01-22 NOTE — ASSESSMENT & PLAN NOTE
Pt with right long finger felon onset 1/11 s/p I&D by ED staff 1/13 and s/p repeat I&D with cultures by orthopedics 1/16 with persistent felon, active purulent drainage. Sent to [facility] ED from hand clinic for repeat irrigation and debridement by orthopedics 1/20/22. Packing pulled 1/22/22. Improved appearance of clinical exam.    -Begin betadine soaks today  -Cont IV abx  -Following clinical exam  -If continues to improve, can transition to PO abx per ID recs    Dispo: Pending clinical improvement, anticipate dc with PO abx    Patient was scheduled for follow up in hand clinic next week

## 2023-01-22 NOTE — SUBJECTIVE & OBJECTIVE
"Principal Problem:Felon of finger of right hand    Principal Orthopedic Problem: same as above    Interval History: Patient seen and examined at bedside. DIANAParminder Kalyan pulled this am. Wound appearance improved from prior, minimal purulence. Okay for diet    Review of patient's allergies indicates:  No Known Allergies    Current Facility-Administered Medications   Medication    acetaminophen tablet 650 mg    aluminum-magnesium hydroxide-simethicone 200-200-20 mg/5 mL suspension 30 mL    aspirin chewable tablet 81 mg    atorvastatin tablet 40 mg    ferrous sulfate tablet 1 each    HYDROcodone-acetaminophen 5-325 mg per tablet 1 tablet    ibuprofen tablet 400 mg    melatonin tablet 6 mg    methocarbamoL tablet 750 mg    ondansetron disintegrating tablet 8 mg    pantoprazole EC tablet 40 mg    piperacillin-tazobactam (ZOSYN) 4.5 g in dextrose 5 % in water (D5W) 5 % 100 mL IVPB (MB+)    sodium chloride 0.9% flush 10 mL    sucralfate 100 mg/mL suspension 1 g     Objective:     Vital Signs (Most Recent):  Temp: 98.3 °F (36.8 °C) (01/22/23 0401)  Pulse: 78 (01/22/23 0401)  Resp: 16 (01/22/23 0528)  BP: 128/73 (01/22/23 0401)  SpO2: 100 % (01/22/23 0401) Vital Signs (24h Range):  Temp:  [97.5 °F (36.4 °C)-98.3 °F (36.8 °C)] 98.3 °F (36.8 °C)  Pulse:  [62-78] 78  Resp:  [16-18] 16  SpO2:  [95 %-100 %] 100 %  BP: (119-150)/(71-88) 128/73     Weight: 72.6 kg (160 lb)  Height: 5' 4" (162.6 cm)  Body mass index is 27.46 kg/m².    No intake or output data in the 24 hours ending 01/22/23 0646    Physical exam:  Gen:  No acute distress, well-developed, well nourished.  CV:  Peripherally well-perfused. 2+ radial pulses, symmetric.  Respiratory:  Normal respiratory effort. No accessory muscle use.   Head/Neck:  Normocephalic.  Atraumatic. Sclera anicteric. TM. Neck supple.  Neuro: CN 2-12 grossly intact. No FND. Awake. Alert. Oriented to person, place, time, and situation.  Abdomen: Soft, NTND.        MSK:  Right Upper " "extremity  Inspection  - Skin lesion over pulp of 3rd digit with swelling  - Appears well-decompressed, no expressible purulence  Palpation  - TTP over felon  Range of motion  - AROM and PROM of the shoulder, elbow, wrist, and hand intact including 3rd digit without pain  Stability  - No evidence of joint dislocation or abnormal laxity   Neurovascular  - AIN/PIN/Radial/Median/Ulnar Nerves assessed in isolation without deficit  - Able to give thumbs up, make "OK" sign, cross IF/LF, abduct/adduct fingers, make fist  - SILT throughout  - Compartments soft  - Radial artery palpated  - Capillary Refill <3s  - Muscle tone normal       Significant Labs: All pertinent labs within the past 24 hours have been reviewed.    Significant Imaging: I have reviewed all pertinent imaging results/findings.  "

## 2023-01-22 NOTE — CONSULTS
[facility] - Surgery  Infectious Disease  Consult Note    Patient Name: Patti Hernandez  MRN: [id number]  Admission Date: 1/20/2023  Hospital Length of Stay: 0 days  Attending Physician: Mohinder Herron MD  Primary Care Provider: Megha Brar NP     Isolation Status: No active isolations    Patient information was obtained from patient and ER records.      Inpatient consult to Infectious Diseases  Consult performed by: Timur Magana MD  Consult ordered by: Chiki Villatoro MD      Assessment/Plan:     * Felon of finger of right hand  Patient is a 46 yo female with a persistent right hand felon. She has has I&D 3 times and has taken courses of Keflex and Clinda.   Afebrile. Vitals stable.  Cultures from 01/16 grew Klebsiella and Bacteroides, gram negative rods.    Patient has felon growing gram negative rods and has had inadequate antibiotic coverage for cultured organisms.    Recommend:  -Transition to Augmentin 875mg q12h (Stop date: 02/03)  -Stop zosyn        Thank you for your consult. I will follow-up with patient. Please contact us if you have any additional questions.    Juarez Schulz, DO  Infectious Disease  [facility] - [facility]    Subjective:     Principal Problem: Felon of finger of right hand    HPI: Patient is a 46 yo women with a PMH of previous CVA, uterine fibroids, and menorrhagia Fe-deficient anemia, who presented to [facility] [place] with referral from hand clinic with non-healing felon of the right third phalanx. She originally developed symptoms of swelling and pain of her finger on 01/11. She was seen in the ED 01/13 where she had I&D performed and was prescribed Keflex and Bactrim but for unknown reasons only received the Keflex which she was taking but symptoms of pain and swelling persisted until 01/16 when she represented to the ED. She again had the felon drained and cultured and was prescribed Clinda. Cultures eventually grew Klebsiella and Bacteroides. She was seen at the hand clinic 01/18 and  Clinda was extended. Seen again  at the hand clinic and was sent to the ED for further evaluation of persistent pain and swelling.     Upon arrival to the ED: She had incision and drainage performed bedside and was started on Zosyn.    Infectious disease consulted for management of antibiotics in persistent felon.      Past Medical History:   Diagnosis Date    Encounter for blood transfusion     4 per patient report (as of 2021)    Hx of ischemic right ITZ stroke 2017    Iron deficiency anemia     Tobacco use disorder 2021    Uterine fibroid        Past Surgical History:   Procedure Laterality Date     SECTION      x2    tubal ligation         Review of patient's allergies indicates:  No Known Allergies    Medications:  Medications Prior to Admission   Medication Sig    [] cephALEXin (KEFLEX) 500 MG capsule Take 1 capsule (500 mg total) by mouth 4 (four) times daily. for 7 days    clindamycin (CLEOCIN) 150 MG capsule Take 3 capsules (450 mg total) by mouth 3 (three) times daily. for 7 days    amoxicillin (AMOXIL) 500 MG capsule amoxicillin 500 mg capsule    aspirin 81 MG Chew Take 1 tablet (81 mg total) by mouth once daily.    atorvastatin (LIPITOR) 40 MG tablet Take 1 tablet (40 mg total) by mouth once daily. (Patient not taking: Reported on 2021)    clarithromycin (BIAXIN) 500 MG tablet clarithromycin 500 mg tablet    ferrous sulfate 325 (65 FE) MG EC tablet Take 1 tablet (325 mg total) by mouth 2 (two) times daily.    HYDROcodone-acetaminophen (NORCO) 5-325 mg per tablet Take 1 tablet by mouth every 6 (six) hours as needed for Pain.    nicotine (NICODERM CQ) 21 mg/24 hr Place 1 patch onto the skin once daily.    omeprazole (PRILOSEC) 20 MG capsule Take 1 capsule (20 mg total) by mouth 2 (two) times a day. for 10 days    [] sulfamethoxazole-trimethoprim 800-160mg (BACTRIM DS) 800-160 mg Tab Take 1 tablet by mouth 2 (two) times daily. for 7 days    traMADoL (ULTRAM) 50  mg tablet Take 1 tablet (50 mg total) by mouth every 6 (six) hours as needed for Pain.     Antibiotics (From admission, onward)      Start     Stop Route Frequency Ordered    01/20/23 1715  piperacillin-tazobactam (ZOSYN) 4.5 g in dextrose 5 % in water (D5W) 5 % 100 mL IVPB (MB+)         -- IV Every 8 hours (non-standard times) 01/20/23 1614          Antifungals (From admission, onward)      None          Antivirals (From admission, onward)      None               There is no immunization history on file for this patient.    Family History       Problem Relation (Age of Onset)    Anemia Mother    Myasthenia gravis Child, Child    Stroke Mother    Throat cancer Father          Social History     Socioeconomic History    Marital status: Single   Tobacco Use    Smoking status: Every Day     Packs/day: 1.00     Years: 20.00     Pack years: 20.00     Types: Cigarettes     Start date: 2003    Smokeless tobacco: Never   Substance and Sexual Activity    Alcohol use: Not Currently     Alcohol/week: 1.0 standard drink     Types: 1 Shots of liquor per week     Comment: Quit in 2020    Drug use: No    Sexual activity: Yes     Partners: Male     Review of Systems   Constitutional:  Negative for activity change, appetite change, chills, fatigue and fever.   HENT:  Negative for congestion and sore throat.    Eyes:  Negative for visual disturbance.   Respiratory:  Negative for cough and shortness of breath.    Cardiovascular:  Negative for chest pain, palpitations and leg swelling.   Gastrointestinal:  Negative for abdominal pain, constipation, diarrhea, nausea and vomiting.   Genitourinary:  Negative for dysuria, frequency and urgency.   Musculoskeletal:  Negative for back pain.   Skin:  Positive for wound.   Neurological:  Negative for dizziness and headaches.   Objective:     Vital Signs (Most Recent):  Temp: 98.3 °F (36.8 °C) (01/22/23 0401)  Pulse: 78 (01/22/23 0401)  Resp: 16 (01/22/23 0528)  BP: 128/73 (01/22/23 0401)  SpO2:  100 % (01/22/23 0401) Vital Signs (24h Range):  Temp:  [97.5 °F (36.4 °C)-98.3 °F (36.8 °C)] 98.3 °F (36.8 °C)  Pulse:  [68-78] 78  Resp:  [16-18] 16  SpO2:  [95 %-100 %] 100 %  BP: (125-150)/(71-88) 128/73     Weight: 72.6 kg (160 lb)  Body mass index is 27.46 kg/m².    Estimated Creatinine Clearance: 86.8 mL/min (based on SCr of 0.8 mg/dL).    Physical Exam  Vitals and nursing note reviewed.   Constitutional:       Appearance: Normal appearance.   HENT:      Head: Normocephalic and atraumatic.      Nose: Nose normal.      Mouth/Throat:      Mouth: Mucous membranes are moist.   Eyes:      Extraocular Movements: Extraocular movements intact.      Pupils: Pupils are equal, round, and reactive to light.   Cardiovascular:      Rate and Rhythm: Normal rate and regular rhythm.      Pulses: Normal pulses.      Heart sounds: Normal heart sounds. No murmur heard.  Pulmonary:      Effort: Pulmonary effort is normal. No respiratory distress.      Breath sounds: Normal breath sounds. No wheezing, rhonchi or rales.   Abdominal:      General: Abdomen is flat. Bowel sounds are normal. There is no distension.      Palpations: Abdomen is soft.      Tenderness: There is no abdominal tenderness.   Musculoskeletal:      Right lower leg: No edema.      Left lower leg: No edema.   Skin:     General: Skin is warm and dry.      Capillary Refill: Capillary refill takes less than 2 seconds.      Findings: Lesion (On third phalynx of right hand) present.   Neurological:      Mental Status: She is alert and oriented to person, place, and time.   Psychiatric:         Mood and Affect: Mood normal.         Behavior: Behavior normal.                     Significant Labs: CBC:   Recent Labs   Lab 01/20/23  1323   WBC 7.91   HGB 7.8*   HCT 27.3*        CMP:   Recent Labs   Lab 01/20/23  1323      K 4.1      CO2 24      BUN 18   CREATININE 0.8   CALCIUM 9.6   PROT 7.4   ALBUMIN 3.5   BILITOT 0.2   ALKPHOS 84   AST 19   ALT  23   ANIONGAP 7*       Significant Imaging: I have reviewed all pertinent imaging results/findings within the past 24 hours.

## 2023-01-22 NOTE — SUBJECTIVE & OBJECTIVE
Past Medical History:   Diagnosis Date    Encounter for blood transfusion     4 per patient report (as of 2021)    Hx of ischemic right ITZ stroke 2017    Iron deficiency anemia     Tobacco use disorder 2021    Uterine fibroid        Past Surgical History:   Procedure Laterality Date     SECTION      x2    tubal ligation         Review of patient's allergies indicates:  No Known Allergies    Medications:  Medications Prior to Admission   Medication Sig    [] cephALEXin (KEFLEX) 500 MG capsule Take 1 capsule (500 mg total) by mouth 4 (four) times daily. for 7 days    clindamycin (CLEOCIN) 150 MG capsule Take 3 capsules (450 mg total) by mouth 3 (three) times daily. for 7 days    amoxicillin (AMOXIL) 500 MG capsule amoxicillin 500 mg capsule    aspirin 81 MG Chew Take 1 tablet (81 mg total) by mouth once daily.    atorvastatin (LIPITOR) 40 MG tablet Take 1 tablet (40 mg total) by mouth once daily. (Patient not taking: Reported on 2021)    clarithromycin (BIAXIN) 500 MG tablet clarithromycin 500 mg tablet    ferrous sulfate 325 (65 FE) MG EC tablet Take 1 tablet (325 mg total) by mouth 2 (two) times daily.    HYDROcodone-acetaminophen (NORCO) 5-325 mg per tablet Take 1 tablet by mouth every 6 (six) hours as needed for Pain.    nicotine (NICODERM CQ) 21 mg/24 hr Place 1 patch onto the skin once daily.    omeprazole (PRILOSEC) 20 MG capsule Take 1 capsule (20 mg total) by mouth 2 (two) times a day. for 10 days    [] sulfamethoxazole-trimethoprim 800-160mg (BACTRIM DS) 800-160 mg Tab Take 1 tablet by mouth 2 (two) times daily. for 7 days    traMADoL (ULTRAM) 50 mg tablet Take 1 tablet (50 mg total) by mouth every 6 (six) hours as needed for Pain.     Antibiotics (From admission, onward)      Start     Stop Route Frequency Ordered    23 4308  piperacillin-tazobactam (ZOSYN) 4.5 g in dextrose 5 % in water (D5W) 5 % 100 mL IVPB (MB+)         -- IV Every 8 hours (non-standard  times) 01/20/23 1614          Antifungals (From admission, onward)      None          Antivirals (From admission, onward)      None               There is no immunization history on file for this patient.    Family History       Problem Relation (Age of Onset)    Anemia Mother    Myasthenia gravis Child, Child    Stroke Mother    Throat cancer Father          Social History     Socioeconomic History    Marital status: Single   Tobacco Use    Smoking status: Every Day     Packs/day: 1.00     Years: 20.00     Pack years: 20.00     Types: Cigarettes     Start date: 2003    Smokeless tobacco: Never   Substance and Sexual Activity    Alcohol use: Not Currently     Alcohol/week: 1.0 standard drink     Types: 1 Shots of liquor per week     Comment: Quit in 2020    Drug use: No    Sexual activity: Yes     Partners: Male     Review of Systems   Constitutional:  Negative for activity change, appetite change, chills, fatigue and fever.   HENT:  Negative for congestion and sore throat.    Eyes:  Negative for visual disturbance.   Respiratory:  Negative for cough and shortness of breath.    Cardiovascular:  Negative for chest pain, palpitations and leg swelling.   Gastrointestinal:  Negative for abdominal pain, constipation, diarrhea, nausea and vomiting.   Genitourinary:  Negative for dysuria, frequency and urgency.   Musculoskeletal:  Negative for back pain.   Skin:  Positive for wound.   Neurological:  Negative for dizziness and headaches.   Objective:     Vital Signs (Most Recent):  Temp: 98.3 °F (36.8 °C) (01/22/23 0401)  Pulse: 78 (01/22/23 0401)  Resp: 16 (01/22/23 0528)  BP: 128/73 (01/22/23 0401)  SpO2: 100 % (01/22/23 0401) Vital Signs (24h Range):  Temp:  [97.5 °F (36.4 °C)-98.3 °F (36.8 °C)] 98.3 °F (36.8 °C)  Pulse:  [68-78] 78  Resp:  [16-18] 16  SpO2:  [95 %-100 %] 100 %  BP: (125-150)/(71-88) 128/73     Weight: 72.6 kg (160 lb)  Body mass index is 27.46 kg/m².    Estimated Creatinine Clearance: 86.8 mL/min (based  on SCr of 0.8 mg/dL).    Physical Exam  Vitals and nursing note reviewed.   Constitutional:       Appearance: Normal appearance.   HENT:      Head: Normocephalic and atraumatic.      Nose: Nose normal.      Mouth/Throat:      Mouth: Mucous membranes are moist.   Eyes:      Extraocular Movements: Extraocular movements intact.      Pupils: Pupils are equal, round, and reactive to light.   Cardiovascular:      Rate and Rhythm: Normal rate and regular rhythm.      Pulses: Normal pulses.      Heart sounds: Normal heart sounds. No murmur heard.  Pulmonary:      Effort: Pulmonary effort is normal. No respiratory distress.      Breath sounds: Normal breath sounds. No wheezing, rhonchi or rales.   Abdominal:      General: Abdomen is flat. Bowel sounds are normal. There is no distension.      Palpations: Abdomen is soft.      Tenderness: There is no abdominal tenderness.   Musculoskeletal:      Right lower leg: No edema.      Left lower leg: No edema.   Skin:     General: Skin is warm and dry.      Capillary Refill: Capillary refill takes less than 2 seconds.      Findings: Lesion (On third phalynx of right hand) present.   Neurological:      Mental Status: She is alert and oriented to person, place, and time.   Psychiatric:         Mood and Affect: Mood normal.         Behavior: Behavior normal.                     Significant Labs: CBC:   Recent Labs   Lab 01/20/23  1323   WBC 7.91   HGB 7.8*   HCT 27.3*        CMP:   Recent Labs   Lab 01/20/23  1323      K 4.1      CO2 24      BUN 18   CREATININE 0.8   CALCIUM 9.6   PROT 7.4   ALBUMIN 3.5   BILITOT 0.2   ALKPHOS 84   AST 19   ALT 23   ANIONGAP 7*       Significant Imaging: I have reviewed all pertinent imaging results/findings within the past 24 hours.

## 2023-01-22 NOTE — HPI
Patient is a 46 yo women with a PMH of previous CVA, uterine fibroids, and menorrhagia Fe-deficient anemia, who presented to [facility] ED with referral from hand clinic with non-healing felon of the right third phalanx. She originally developed symptoms of swelling and pain of her finger on 01/11. She was seen in the ED 01/13 where she had I&D performed and was prescribed Keflex and Bactrim but for unknown reasons only received the Keflex which she was taking but symptoms of pain and swelling persisted until 01/16 when she represented to the ED. She again had the felon drained and cultured and was prescribed Clinda. Cultures eventually grew Klebsiella and Bacteroides. She was seen at the hand clinic 01/18 and Clinda was extended. Seen again 01/20 at the hand clinic and was sent to the ED for further evaluation of persistent pain and swelling.     Upon arrival to the ED: She had incision and drainage performed bedside and was started on Zosyn.    Infectious disease consulted for management of antibiotics in persistent felon.

## 2023-01-22 NOTE — DISCHARGE INSTRUCTIONS
Perform twice daily soaks in 50/50 betadine and normal saline mixture. Apply clean dressing after each soak.     Keep your follow up at [facility] clinic at 9:30 AM on January 25

## 2023-01-22 NOTE — ASSESSMENT & PLAN NOTE
Pt with right long finger felon onset 1/11 s/p I&D by ED staff 1/13 and s/p repeat I&D with cultures by orthopedics 1/16 with persistent felon, active purulent drainage. Sent to [facility] ED from hand clinic for repeat irrigation and debridement by orthopedics 1/20/22. Packing pulled 1/22/22. Improved appearance of clinical exam.    -Begin betadine soaks today  -Cont IV abx  -Following clinical exam  -If continues to improve, can transition to PO abx per ID recs    Dispo: Pending clinical improvement, anticipate dc with PO abx    Patient was scheduled for follow up in hand clinic next week

## 2023-01-22 NOTE — ASSESSMENT & PLAN NOTE
Patient is a 46 yo female with a persistent right hand felon. She has has I&D 3 times and has taken courses of Keflex and Clinda.   Afebrile. Vitals stable.  Cultures from 01/16 grew Klebsiella and Bacteroides, gram negative rods.    Patient has felon growing gram negative rods and has had inadequate antibiotic coverage for cultured organisms.    Recommend:  -Transition to Augmentin 875mg q12h (Stop date: 02/03)  -Stop zosyn

## 2023-01-22 NOTE — DISCHARGE SUMMARY
[facility]  Orthopedics  Discharge Summary      Patient Name: Patti Hernandez  MRN: [id number]  Admission Date: 1/20/2023  Hospital Length of Stay: 0 days  Discharge Date and Time:  01/22/2023 3:39 PM  Attending Physician: Mohinder Herron MD   Discharging Provider: Chiki Villatoro MD  Primary Care Provider: Megha Brar NP    HPI:   47-year-old female with past medical history of previous CVA, uterine fibroids, menorrhagia Fe-deficient anemia, presenting to [facility] ED with referral from hand clinic with non-healing felon of the right third phalanx.     Patient initially noted swelling 1/11 and was seen in [facility] ED 1/13 where bedside I&D was performed by ED and she was discharged on Keflex and Bactrim but reports only receiving Keflex. She presented back to [facility] ED 1/16 where she was seen by orthopedics at and underwent right long finger felon irrigation and debridement, cultures grew Bacteroides and Klebsiella, and she was sent home with on Clindamycin. Pt presented to hand clinic for ED follow up 1/18 at which time packing was removed, purulent discharge was noted with packing removal, some additional purulence was expressed following packing removal. Clindamycin was extended. Patient was seen at hand clinic today and referred to [facility] ED for further management due to poor impovement of felon.     Patient reports compliance with antibiotics and has kept finger covered with soft dressing since her recent clinic visit.     Denies chemotherapy, radiation therapy, or immunosuppressant use. She ambulates without a walker at baseline. She denies anticoagulation use at baseline.       * No surgery found *      Hospital Course:  On 1/20/23, the patient arrived to the [facility] ED. Repeat I&D was performed for right long finger felon, without complication and the patient was admitted for IV antibiotics. The interim of the hospital stay from arrival on the floor up to discharge has been uncomplicated. The patient has  tolerated regular diet.  The patient's pain has been controlled using a multimodal approach. Currently, the patient's pain is well controlled on an oral regimen.  The patient has been voiding without difficulty. Wound packing was pulled and the wound was clinically improved with proper antibiotic coverage. The patient was transitioned to appropriate PO antibiotics per ID recommendations.  Currently, the patient's progress is sufficient to allow the them to be discharged to home safely.  The patient agrees with this assessment and desires a discharge today.        Goals of Care Treatment Preferences:  Code Status: Full Code      Consults (From admission, onward)        Status Ordering Provider     Inpatient consult to Infectious Diseases  Once        Provider:  (Not yet assigned)    Completed SILVINA SMALLS     Inpatient consult to Orthopedic Surgery  Once        Provider:  (Not yet assigned)    Completed DONNA RENO          Significant Diagnostic Studies: No pertinent studies.    Pending Diagnostic Studies:     None        Final Active Diagnoses:    Diagnosis Date Noted POA    PRINCIPAL PROBLEM:  Felon of finger of right hand [L03.011] 01/16/2023 Yes      Problems Resolved During this Admission:      Discharged Condition: good    Disposition: Home or Self Care    Follow Up:   Follow-up Information     Ashley Elias PA-C Follow up in 3 day(s).    Specialties: Hand Surgery, Orthopedic Surgery  Contact information:  [address] [place] [address] [id number] [phone]                       Patient Instructions:      Diet general     Call MD for:  temperature >100.4     Call MD for:  persistent nausea and vomiting     Call MD for:  severe uncontrolled pain     Call MD for:  difficulty breathing, headache or visual disturbances     Call MD for:  hives     Call MD for:  persistent dizziness or light-headedness     Call MD for:  extreme fatigue     No driving, operating heavy equipment or signing legal  documents while taking pain medication.     Wound care routine (specify)   Order Comments: Wound care routine: Perform twice daily soaks in 50/50 betadine and normal saline mixture. Apply clean gauze dressing afterwards.     Medications:  Reconciled Home Medications:      Medication List      START taking these medications    acetaminophen 325 MG tablet  Commonly known as: TYLENOL  Take 2 tablets (650 mg total) by mouth 4 (four) times daily.     amoxicillin-clavulanate 875-125mg 875-125 mg per tablet  Commonly known as: AUGMENTIN  Take 1 tablet by mouth every 12 (twelve) hours. for 12 days     ibuprofen 400 MG tablet  Commonly known as: ADVIL,MOTRIN  Take 1 tablet (400 mg total) by mouth every 4 (four) hours as needed for Other (Pain).     oxyCODONE 5 MG immediate release tablet  Commonly known as: ROXICODONE  Take 1 tablet (5 mg total) by mouth every 6 (six) hours as needed for Pain.        CONTINUE taking these medications    aspirin 81 MG Chew  Take 1 tablet (81 mg total) by mouth once daily.     atorvastatin 40 MG tablet  Commonly known as: LIPITOR  Take 1 tablet (40 mg total) by mouth once daily.     ferrous sulfate 325 (65 FE) MG EC tablet  Take 1 tablet (325 mg total) by mouth 2 (two) times daily.     nicotine 21 mg/24 hr  Commonly known as: NICODERM CQ  Place 1 patch onto the skin once daily.     omeprazole 20 MG capsule  Commonly known as: PRILOSEC  Take 1 capsule (20 mg total) by mouth 2 (two) times a day. for 10 days        STOP taking these medications    amoxicillin 500 MG capsule  Commonly known as: AMOXIL     cephALEXin 500 MG capsule  Commonly known as: KEFLEX     clarithromycin 500 MG tablet  Commonly known as: BIAXIN     clindamycin 150 MG capsule  Commonly known as: CLEOCIN     HYDROcodone-acetaminophen 5-325 mg per tablet  Commonly known as: NORCO     sulfamethoxazole-trimethoprim 800-160mg 800-160 mg Tab  Commonly known as: BACTRIM DS     traMADoL 50 mg tablet  Commonly known as: ULTRAM             Chiki Villatoro MD  Orthopedics  [facility] - [facility]

## 2023-01-23 LAB
BACTERIA SPEC ANAEROBE CULT: ABNORMAL
BACTERIA SPEC ANAEROBE CULT: ABNORMAL

## 2023-01-24 ENCOUNTER — TELEPHONE (OUTPATIENT)
Dept: ORTHOPEDICS | Facility: CLINIC | Age: 48
End: 2023-01-24
Payer: MEDICAID

## 2023-01-25 ENCOUNTER — OFFICE VISIT (OUTPATIENT)
Dept: ORTHOPEDICS | Facility: CLINIC | Age: 48
End: 2023-01-25
Payer: MEDICAID

## 2023-01-25 VITALS — BODY MASS INDEX: 27.31 KG/M2 | WEIGHT: 160 LBS | HEIGHT: 64 IN

## 2023-01-25 DIAGNOSIS — L03.019 FELON OF FINGER: Primary | ICD-10-CM

## 2023-01-25 PROCEDURE — 99999 PR PBB SHADOW E&M-EST. PATIENT-LVL III: CPT | Mod: PBBFAC,,, | Performed by: PHYSICIAN ASSISTANT

## 2023-01-25 PROCEDURE — 99214 OFFICE O/P EST MOD 30 MIN: CPT | Mod: S$PBB,,, | Performed by: PHYSICIAN ASSISTANT

## 2023-01-25 PROCEDURE — 3008F PR BODY MASS INDEX (BMI) DOCUMENTED: ICD-10-PCS | Mod: CPTII,,, | Performed by: PHYSICIAN ASSISTANT

## 2023-01-25 PROCEDURE — 1159F PR MEDICATION LIST DOCUMENTED IN MEDICAL RECORD: ICD-10-PCS | Mod: CPTII,,, | Performed by: PHYSICIAN ASSISTANT

## 2023-01-25 PROCEDURE — 99999 PR PBB SHADOW E&M-EST. PATIENT-LVL III: ICD-10-PCS | Mod: PBBFAC,,, | Performed by: PHYSICIAN ASSISTANT

## 2023-01-25 PROCEDURE — 3008F BODY MASS INDEX DOCD: CPT | Mod: CPTII,,, | Performed by: PHYSICIAN ASSISTANT

## 2023-01-25 PROCEDURE — 99213 OFFICE O/P EST LOW 20 MIN: CPT | Mod: PBBFAC | Performed by: PHYSICIAN ASSISTANT

## 2023-01-25 PROCEDURE — 1159F MED LIST DOCD IN RCRD: CPT | Mod: CPTII,,, | Performed by: PHYSICIAN ASSISTANT

## 2023-01-25 PROCEDURE — 99214 PR OFFICE/OUTPT VISIT, EST, LEVL IV, 30-39 MIN: ICD-10-PCS | Mod: S$PBB,,, | Performed by: PHYSICIAN ASSISTANT

## 2023-01-25 NOTE — PROGRESS NOTES
Subjective:      Patient ID: Patti Hernandez is a 47 y.o. female.    Chief Complaint: Pain of the Right Hand    1/20/23   Pt presents for follow up right long finger felon. Symptom onset 1/11. Pt seen in ED 1/13, beside I&D performed by ED staff, d/c on Keflex and Bactrim but reports only received and took Keflex. She presented back to the ED 1/16, she was seen by orthopedics at this time and underwent right long finger felon irrigation and debridement, cultures obtained. D/c on Clindamycin. Pt presented to hand clinic for ED follow up 1/18 at which time packing was removed, purulent discharge was noted with packing removal, some additional purulence was expressed following packing removal. Clindamycin was extended. Infectious Disease referral placed, she was scheduled for an appt in March. She reports compliance with abx. She has kept finger covered with soft dressing since her recent clinic visit. She notes minimal improvement in pain. Cultures and sensitivities below.    1/25/23   Pt presents for follow up right long finger felon. Pt last seen 1/20 at which time she was s/p I&D x 2 with persistent purulence. She was referred back to the ED and underwent repeat I&D by orthopedics 1/20, she was admitted for IV abx. Prior to d/c she was transitioned to oral abx, Augmentin BID, per ID recommendations.     She reports improvement in symptoms. She reports pain is better though still present. Reports compliance with abx. She has kept finger covered with soft dressing.     Review of patient's allergies indicates:  No Known Allergies      Current Outpatient Medications   Medication Sig Dispense Refill    acetaminophen (TYLENOL) 325 MG tablet Take 2 tablets (650 mg total) by mouth 4 (four) times daily. 60 tablet 0    amoxicillin-clavulanate 875-125mg (AUGMENTIN) 875-125 mg per tablet Take 1 tablet by mouth every 12 (twelve) hours. for 12 days 24 tablet 0    ferrous sulfate 325 (65 FE) MG EC tablet Take 1 tablet (325 mg  "total) by mouth 2 (two) times daily. 60 tablet 0    ibuprofen (ADVIL,MOTRIN) 400 MG tablet Take 1 tablet (400 mg total) by mouth every 4 (four) hours as needed for Other (Pain). 60 tablet 0    nicotine (NICODERM CQ) 21 mg/24 hr Place 1 patch onto the skin once daily. 14 patch 0    oxyCODONE (ROXICODONE) 5 MG immediate release tablet Take 1 tablet (5 mg total) by mouth every 6 (six) hours as needed for Pain. 10 tablet 0    aspirin 81 MG Chew Take 1 tablet (81 mg total) by mouth once daily. 30 tablet 11    atorvastatin (LIPITOR) 40 MG tablet Take 1 tablet (40 mg total) by mouth once daily. (Patient not taking: Reported on 2021) 90 tablet 3    omeprazole (PRILOSEC) 20 MG capsule Take 1 capsule (20 mg total) by mouth 2 (two) times a day. for 10 days 20 capsule 0     No current facility-administered medications for this visit.       Past Medical History:   Diagnosis Date    Encounter for blood transfusion     4 per patient report (as of 2021)    Hx of ischemic right ITZ stroke 2017    Iron deficiency anemia     Tobacco use disorder 2021    Uterine fibroid        Past Surgical History:   Procedure Laterality Date     SECTION      x2    tubal ligation           Review of Systems:  ROS:  Constitutional: no fever or chills  Skin: no rash or suspicious lesions  Musculoskeletal: See HPI.   Neurological: no headaches, lightheadedness, or dizziness. No numbness or tingling  Psychological/behavioral: no anxiety or depression      OBJECTIVE:     PHYSICAL EXAM:  Height: 5' 4" (162.6 cm) Weight: 72.6 kg (160 lb)  Vitals:    23 0942   Weight: 72.6 kg (160 lb)   Height: 5' 4" (1.626 m)   PainSc:   7         Vitals reviewed.  Constitutional: NAD. Patient appears well-developed and well-nourished.   HENT:   Head: Normocephalic and atraumatic.   Neck: Normal range of motion.   Cardiovascular: Normal rate.    Pulmonary/Chest: Effort normal. No respiratory distress.   Neurological: Patient is awake, alert, " oriented.   Psychiatric: Patient has a normal mood and affect. Behavior is normal. Judgment and thought content normal.  Musculoskeletal:    Right long finger  Soft dressing removed. There is drainage with pressure to the finger pulp, additional drainage expressed. Acrylic nail overlies nail plate. She has improved tenderness at the distal volar finger though still minimal tenderness. Improvement in erythema and warmth. Incision is open with evidence of some healing. Decreased full finger flexion secondary to pain, edema. NVI.              RADIOGRAPHS:  Right long finger xray 1/20/23   Impression:   Significant soft tissue edema and emphysema with questionable lucency in the 3rd distal phalangeal tuft suggestive of infectious process with early osteomyelitis difficult to exclude.  Comments: I have personally reviewed the imaging and I agree with the above radiologist's report.    MICROBIOLOGY:  Anaerobic Culture  Abnormal   BACTEROIDES UNIFORMIS   Moderate         Aerobic Bacterial Culture  Abnormal   KLEBSIELLA OXYTOCA   Few     Resulting Agency [facility]        Susceptibility     Klebsiella oxytoca     CULTURE, AEROBIC  (SPECIFY SOURCE)     Amox/K Clav'ate <=8/4 mcg/mL Sensitive     Amp/Sulbactam <=8/4 mcg/mL Sensitive     Ampicillin >16 mcg/mL Resistant     Cefazolin >16 mcg/mL Resistant     Cefepime <=2 mcg/mL Sensitive     Ceftriaxone <=1 mcg/mL Sensitive     Ciprofloxacin <=1 mcg/mL Sensitive     Ertapenem <=0.5 mcg/mL Sensitive     Gentamicin <=4 mcg/mL Sensitive     Levofloxacin <=2 mcg/mL Sensitive     Meropenem <=1 mcg/mL Sensitive     Piperacillin/Tazo <=16 mcg/mL Sensitive     Tetracycline <=4 mcg/mL Sensitive     Tobramycin <=4 mcg/mL Sensitive     Trimeth/Sulfa <=2/38 mcg/mL Sensitive                 ASSESSMENT/PLAN:   Patti was seen today for pain.    Diagnoses and all orders for this visit:    Hair of finger  -     MRI Hand Fingers W WO Contrast Right; Future         - We talked at length about the  anatomy and pathophysiology of   Encounter Diagnosis   Name Primary?    Felon of finger Yes       Pt with right long finger felon onset 1/11 s/p I&D by ED staff 1/13 and s/p repeat I&D with cultures by orthopedics 1/16, repeat I&D by orthopedics again on 1/20. Gradual improvement though still purulent drainage. Concern for possible osteomyelitis on xray.     Plan   Continue abx per ID   MRI of the right long finger for further evaluation   RTC following MRI. Return/ ED precautions discussed.

## 2023-01-26 ENCOUNTER — HOSPITAL ENCOUNTER (OUTPATIENT)
Dept: RADIOLOGY | Facility: HOSPITAL | Age: 48
Discharge: HOME | End: 2023-01-26
Attending: PHYSICIAN ASSISTANT
Payer: MEDICAID

## 2023-01-26 DIAGNOSIS — L03.019 FELON OF FINGER: ICD-10-CM

## 2023-01-26 PROCEDURE — A9585 GADOBUTROL INJECTION: HCPCS | Performed by: PHYSICIAN ASSISTANT

## 2023-01-26 PROCEDURE — 73220 MRI UPPR EXTREMITY W/O&W/DYE: CPT | Mod: TC,RT

## 2023-01-26 PROCEDURE — 73220 MRI HAND FINGERS W WO CONTRAST RIGHT: ICD-10-PCS | Mod: 26,RT,, | Performed by: INTERNAL MEDICINE

## 2023-01-26 PROCEDURE — 25500020 PHARM REV CODE 255: Performed by: PHYSICIAN ASSISTANT

## 2023-01-26 PROCEDURE — 73220 MRI UPPR EXTREMITY W/O&W/DYE: CPT | Mod: 26,RT,, | Performed by: INTERNAL MEDICINE

## 2023-01-26 RX ORDER — GADOBUTROL 604.72 MG/ML
8 INJECTION INTRAVENOUS
Status: COMPLETED | OUTPATIENT
Start: 2023-01-26 | End: 2023-01-26

## 2023-01-26 RX ADMIN — GADOBUTROL 8 ML: 604.72 INJECTION INTRAVENOUS at 12:01

## 2023-01-27 ENCOUNTER — TELEPHONE (OUTPATIENT)
Dept: ORTHOPEDICS | Facility: CLINIC | Age: 48
End: 2023-01-27
Payer: MEDICAID

## 2023-01-27 NOTE — TELEPHONE ENCOUNTER
YURY for pt. Requested a call back to the [facility] at [phone] for assistance r/s her MRI and f/u appt with Dr. aGrrison Bloom.

## 2023-01-31 ENCOUNTER — DOCUMENTATION ONLY (OUTPATIENT)
Dept: ORTHOPEDICS | Facility: CLINIC | Age: 48
End: 2023-01-31
Payer: MEDICAID

## 2023-01-31 ENCOUNTER — OFFICE VISIT (OUTPATIENT)
Dept: OBSTETRICS AND GYNECOLOGY | Facility: CLINIC | Age: 48
End: 2023-01-31
Payer: MEDICAID

## 2023-01-31 VITALS
DIASTOLIC BLOOD PRESSURE: 70 MMHG | BODY MASS INDEX: 30.86 KG/M2 | SYSTOLIC BLOOD PRESSURE: 118 MMHG | WEIGHT: 179.81 LBS

## 2023-01-31 DIAGNOSIS — B37.31 VULVOVAGINITIS DUE TO CANDIDA: ICD-10-CM

## 2023-01-31 DIAGNOSIS — Z01.419 WELL WOMAN EXAM: Primary | ICD-10-CM

## 2023-01-31 DIAGNOSIS — N93.9 VAGINAL BLEEDING: ICD-10-CM

## 2023-01-31 DIAGNOSIS — D50.0 ANEMIA DUE TO BLOOD LOSS: ICD-10-CM

## 2023-01-31 DIAGNOSIS — N92.1 MENORRHAGIA WITH IRREGULAR CYCLE: ICD-10-CM

## 2023-01-31 DIAGNOSIS — D21.9 FIBROIDS: ICD-10-CM

## 2023-01-31 PROCEDURE — 81514 NFCT DS BV&VAGINITIS DNA ALG: CPT | Performed by: STUDENT IN AN ORGANIZED HEALTH CARE EDUCATION/TRAINING PROGRAM

## 2023-01-31 PROCEDURE — 3078F PR MOST RECENT DIASTOLIC BLOOD PRESSURE < 80 MM HG: ICD-10-PCS | Mod: CPTII,,, | Performed by: OBSTETRICS & GYNECOLOGY

## 2023-01-31 PROCEDURE — 3008F BODY MASS INDEX DOCD: CPT | Mod: CPTII,,, | Performed by: OBSTETRICS & GYNECOLOGY

## 2023-01-31 PROCEDURE — 88175 CYTOPATH C/V AUTO FLUID REDO: CPT | Performed by: STUDENT IN AN ORGANIZED HEALTH CARE EDUCATION/TRAINING PROGRAM

## 2023-01-31 PROCEDURE — 87591 N.GONORRHOEAE DNA AMP PROB: CPT | Performed by: STUDENT IN AN ORGANIZED HEALTH CARE EDUCATION/TRAINING PROGRAM

## 2023-01-31 PROCEDURE — 3074F PR MOST RECENT SYSTOLIC BLOOD PRESSURE < 130 MM HG: ICD-10-PCS | Mod: CPTII,,, | Performed by: OBSTETRICS & GYNECOLOGY

## 2023-01-31 PROCEDURE — 99999 PR PBB SHADOW E&M-EST. PATIENT-LVL IV: CPT | Mod: PBBFAC,,, | Performed by: OBSTETRICS & GYNECOLOGY

## 2023-01-31 PROCEDURE — 3008F PR BODY MASS INDEX (BMI) DOCUMENTED: ICD-10-PCS | Mod: CPTII,,, | Performed by: OBSTETRICS & GYNECOLOGY

## 2023-01-31 PROCEDURE — 1159F MED LIST DOCD IN RCRD: CPT | Mod: CPTII,,, | Performed by: OBSTETRICS & GYNECOLOGY

## 2023-01-31 PROCEDURE — 99999 PR PBB SHADOW E&M-EST. PATIENT-LVL IV: ICD-10-PCS | Mod: PBBFAC,,, | Performed by: OBSTETRICS & GYNECOLOGY

## 2023-01-31 PROCEDURE — 1160F RVW MEDS BY RX/DR IN RCRD: CPT | Mod: CPTII,,, | Performed by: OBSTETRICS & GYNECOLOGY

## 2023-01-31 PROCEDURE — 3074F SYST BP LT 130 MM HG: CPT | Mod: CPTII,,, | Performed by: OBSTETRICS & GYNECOLOGY

## 2023-01-31 PROCEDURE — 1160F PR REVIEW ALL MEDS BY PRESCRIBER/CLIN PHARMACIST DOCUMENTED: ICD-10-PCS | Mod: CPTII,,, | Performed by: OBSTETRICS & GYNECOLOGY

## 2023-01-31 PROCEDURE — 99386 PREV VISIT NEW AGE 40-64: CPT | Mod: S$PBB,,, | Performed by: OBSTETRICS & GYNECOLOGY

## 2023-01-31 PROCEDURE — 99214 OFFICE O/P EST MOD 30 MIN: CPT | Mod: PBBFAC,PO | Performed by: OBSTETRICS & GYNECOLOGY

## 2023-01-31 PROCEDURE — 1159F PR MEDICATION LIST DOCUMENTED IN MEDICAL RECORD: ICD-10-PCS | Mod: CPTII,,, | Performed by: OBSTETRICS & GYNECOLOGY

## 2023-01-31 PROCEDURE — 99386 PR PREVENTIVE VISIT,NEW,40-64: ICD-10-PCS | Mod: S$PBB,,, | Performed by: OBSTETRICS & GYNECOLOGY

## 2023-01-31 PROCEDURE — 3078F DIAST BP <80 MM HG: CPT | Mod: CPTII,,, | Performed by: OBSTETRICS & GYNECOLOGY

## 2023-01-31 RX ORDER — ASPIRIN 325 MG
1 TABLET, DELAYED RELEASE (ENTERIC COATED) ORAL NIGHTLY
Qty: 45 G | Refills: 3 | Status: SHIPPED | OUTPATIENT
Start: 2023-01-31 | End: 2023-02-07

## 2023-01-31 RX ORDER — FERROUS SULFATE 325(65) MG
325 TABLET, DELAYED RELEASE (ENTERIC COATED) ORAL 2 TIMES DAILY
Qty: 90 TABLET | Refills: 5 | Status: SHIPPED | OUTPATIENT
Start: 2023-01-31

## 2023-01-31 NOTE — PROGRESS NOTES
SPoke with pt. Reviewed MRI results concerning for osteomyelitis. Msg sent to Infectious Disease for sooner appt. We will also schedule her a clinic follow up.

## 2023-01-31 NOTE — PROGRESS NOTES
CC: Annual check-up    SUBJECTIVE:   47 y.o. female   for annual routine Pap and checkup. Patient's last menstrual period was 2023..  She She is complaining of heavy cycles. She endorses regularity and has cycles monthly; however, they can vary from 5-10 days at a time. She reports a hx of being on OCPs for this but that was several years ago.  She states she was worked up at [facility] and had a us w/ fibroids. She reports having gone to the ED in  for this issue and had a blood transfusion as she was having to go through 1 pack of pads in 2days. She denies any bleeding at this time and states her last cycle was on 1/15 and only lasted 5 days. She is endorsing some vaginal itchiness and states she is currently on abx for a recent finger infection. She denies any other issues at this time.       Past Medical History:   Diagnosis Date    Encounter for blood transfusion     4 per patient report (as of 2021)    Hx of ischemic right ITZ stroke 2017    Iron deficiency anemia     Tobacco use disorder 2021    Uterine fibroid      Past Surgical History:   Procedure Laterality Date     SECTION      x2    tubal ligation       Social History     Socioeconomic History    Marital status: Single   Tobacco Use    Smoking status: Every Day     Packs/day: 1.00     Years: 20.00     Pack years: 20.00     Types: Cigarettes     Start date:     Smokeless tobacco: Never   Substance and Sexual Activity    Alcohol use: Not Currently     Alcohol/week: 1.0 standard drink     Types: 1 Shots of liquor per week     Comment: Quit in     Drug use: No    Sexual activity: Yes     Partners: Male     Family History   Problem Relation Age of Onset    Anemia Mother     Stroke Mother     Throat cancer Father     Myasthenia gravis Child     Myasthenia gravis Child      OB History    Para Term  AB Living   6 6 6     6   SAB IAB Ectopic Multiple Live Births                  # Outcome Date GA  Lbr Jalen/2nd Weight Sex Delivery Anes PTL Lv   6 Term            5 Term            4 Term            3 Term            2 Term            1 Term                  Current Outpatient Medications   Medication Sig Dispense Refill    acetaminophen (TYLENOL) 325 MG tablet Take 2 tablets (650 mg total) by mouth 4 (four) times daily. (Patient not taking: Reported on 1/31/2023) 60 tablet 0    amoxicillin-clavulanate 875-125mg (AUGMENTIN) 875-125 mg per tablet Take 1 tablet by mouth every 12 (twelve) hours. for 12 days (Patient not taking: Reported on 1/31/2023) 24 tablet 0    aspirin 81 MG Chew Take 1 tablet (81 mg total) by mouth once daily. 30 tablet 11    atorvastatin (LIPITOR) 40 MG tablet Take 1 tablet (40 mg total) by mouth once daily. 90 tablet 3    ferrous sulfate 325 (65 FE) MG EC tablet Take 1 tablet (325 mg total) by mouth 2 (two) times daily. (Patient not taking: Reported on 1/31/2023) 60 tablet 0    ibuprofen (ADVIL,MOTRIN) 400 MG tablet Take 1 tablet (400 mg total) by mouth every 4 (four) hours as needed for Other (Pain). (Patient not taking: Reported on 1/31/2023) 60 tablet 0    nicotine (NICODERM CQ) 21 mg/24 hr Place 1 patch onto the skin once daily. (Patient not taking: Reported on 1/31/2023) 14 patch 0    omeprazole (PRILOSEC) 20 MG capsule Take 1 capsule (20 mg total) by mouth 2 (two) times a day. for 10 days 20 capsule 0    oxyCODONE (ROXICODONE) 5 MG immediate release tablet Take 1 tablet (5 mg total) by mouth every 6 (six) hours as needed for Pain. (Patient not taking: Reported on 1/31/2023) 10 tablet 0     No current facility-administered medications for this visit.     Allergies: Patient has no known allergies.     ROS:  Constitutional: no weight loss, weight gain, fever, fatigue  Eyes:  No vision changes, glasses/contacts  ENT/Mouth: No ulcers, sinus problems, ears ringing, headache  Cardiovascular: No inability to lie flat, chest pain, exercise intolerance, swelling, heart palpitations  Respiratory:  No wheezing, coughing blood, shortness of breath, or cough  Gastrointestinal: No diarrhea, bloody stool, nausea/vomiting, constipation, gas, hemorrhoids  Genitourinary: No blood in urine, painful urination, urgency of urination, frequency of urination, incomplete emptying, incontinence, abnormal bleeding, painful periods, heavy periods, vaginal discharge, vaginal odor, painful intercourse, sexual problems, bleeding after intercourse.  Musculoskeletal: No muscle weakness  Skin/Breast: No painful breasts, nipple discharge, masses, rash, ulcers  Neurological: No passing out, seizures, numbness, headache  Endocrine: No diabetes, hypothyroid, hyperthyroid, hot flashes, hair loss, abnormal hair growth, ance  Psychiatric: No depression, crying  Hematologic: No bruises, bleeding, swollen lymph nodes, anemia.      OBJECTIVE:   The patient appears well, alert, oriented x 3, in no distress.  /70   Wt 81.5 kg (179 lb 12.6 oz)   LMP 01/18/2023   BMI 30.86 kg/m²   NECK: no thyromegaly, trachea midline  SKIN: no acne, striae, hirsutism  BREAST EXAM: breasts appear normal, no suspicious masses, no skin or nipple changes or axillary nodes  ABDOMEN: no hernias, masses, or hepatosplenomegaly  GENITALIA: normal external genitalia, no erythema, no discharge  URETHRA: normal urethra, normal urethral meatus  VAGINA: Normal  CERVIX: no lesions but some cervical motion tenderness  UTERUS: normal size, contour, position, consistency, mobility, non-tender  ADNEXA: no mass, fullness, tenderness      ASSESSMENT:   well woman  1. Vaginal bleeding        PLAN:   Mammogram ordered today  Pap smear, GC, and Affirm completed today  Discussed pt's hx of AUB and current vaginal itch. For itch will send clotrimazole cream to pharmacy and instructed on use. For her bleeding we will complete pelvic US to further evaluate hx of fibroids. On chart review pt significant for anemia. Encouraged pt to continue to take her Fe supplement. Discussed  possible future interventions including d/c and potential hysterectomy. Will have pt return following US for further evaluation.

## 2023-02-01 ENCOUNTER — PATIENT MESSAGE (OUTPATIENT)
Dept: ORTHOPEDICS | Facility: CLINIC | Age: 48
End: 2023-02-01
Payer: MEDICAID

## 2023-02-02 LAB
C TRACH DNA SPEC QL NAA+PROBE: NOT DETECTED
N GONORRHOEA DNA SPEC QL NAA+PROBE: NOT DETECTED

## 2023-02-06 ENCOUNTER — TELEPHONE (OUTPATIENT)
Dept: OBSTETRICS AND GYNECOLOGY | Facility: CLINIC | Age: 48
End: 2023-02-06
Payer: MEDICAID

## 2023-02-06 LAB
BACTERIAL VAGINOSIS DNA: NEGATIVE
CANDIDA GLABRATA DNA: POSITIVE
CANDIDA KRUSEI DNA: NEGATIVE
CANDIDA RRNA VAG QL PROBE: NEGATIVE
T VAGINALIS RRNA GENITAL QL PROBE: NEGATIVE

## 2023-02-06 RX ORDER — TERCONAZOLE 8 MG/G
1 CREAM VAGINAL NIGHTLY
Qty: 20 G | Refills: 1 | Status: SHIPPED | OUTPATIENT
Start: 2023-02-06

## 2023-02-07 ENCOUNTER — OFFICE VISIT (OUTPATIENT)
Dept: INFECTIOUS DISEASES | Facility: CLINIC | Age: 48
End: 2023-02-07
Payer: MEDICAID

## 2023-02-07 VITALS
BODY MASS INDEX: 30.11 KG/M2 | HEIGHT: 64 IN | TEMPERATURE: 99 F | HEART RATE: 89 BPM | DIASTOLIC BLOOD PRESSURE: 73 MMHG | WEIGHT: 176.38 LBS | SYSTOLIC BLOOD PRESSURE: 108 MMHG

## 2023-02-07 DIAGNOSIS — L03.019 FELON OF FINGER: ICD-10-CM

## 2023-02-07 PROCEDURE — 1159F MED LIST DOCD IN RCRD: CPT | Mod: CPTII,,, | Performed by: INTERNAL MEDICINE

## 2023-02-07 PROCEDURE — 1159F PR MEDICATION LIST DOCUMENTED IN MEDICAL RECORD: ICD-10-PCS | Mod: CPTII,,, | Performed by: INTERNAL MEDICINE

## 2023-02-07 PROCEDURE — 3078F PR MOST RECENT DIASTOLIC BLOOD PRESSURE < 80 MM HG: ICD-10-PCS | Mod: CPTII,,, | Performed by: INTERNAL MEDICINE

## 2023-02-07 PROCEDURE — 99213 OFFICE O/P EST LOW 20 MIN: CPT | Mod: PBBFAC | Performed by: INTERNAL MEDICINE

## 2023-02-07 PROCEDURE — 99214 PR OFFICE/OUTPT VISIT, EST, LEVL IV, 30-39 MIN: ICD-10-PCS | Mod: S$PBB,,, | Performed by: INTERNAL MEDICINE

## 2023-02-07 PROCEDURE — 3078F DIAST BP <80 MM HG: CPT | Mod: CPTII,,, | Performed by: INTERNAL MEDICINE

## 2023-02-07 PROCEDURE — 3008F PR BODY MASS INDEX (BMI) DOCUMENTED: ICD-10-PCS | Mod: CPTII,,, | Performed by: INTERNAL MEDICINE

## 2023-02-07 PROCEDURE — 99214 OFFICE O/P EST MOD 30 MIN: CPT | Mod: S$PBB,,, | Performed by: INTERNAL MEDICINE

## 2023-02-07 PROCEDURE — 3008F BODY MASS INDEX DOCD: CPT | Mod: CPTII,,, | Performed by: INTERNAL MEDICINE

## 2023-02-07 PROCEDURE — 3074F PR MOST RECENT SYSTOLIC BLOOD PRESSURE < 130 MM HG: ICD-10-PCS | Mod: CPTII,,, | Performed by: INTERNAL MEDICINE

## 2023-02-07 PROCEDURE — 99999 PR PBB SHADOW E&M-EST. PATIENT-LVL III: ICD-10-PCS | Mod: PBBFAC,,, | Performed by: INTERNAL MEDICINE

## 2023-02-07 PROCEDURE — 3074F SYST BP LT 130 MM HG: CPT | Mod: CPTII,,, | Performed by: INTERNAL MEDICINE

## 2023-02-07 PROCEDURE — 99999 PR PBB SHADOW E&M-EST. PATIENT-LVL III: CPT | Mod: PBBFAC,,, | Performed by: INTERNAL MEDICINE

## 2023-02-07 RX ORDER — LEVOFLOXACIN 750 MG/1
750 TABLET ORAL DAILY
Qty: 30 TABLET | Refills: 1 | Status: SHIPPED | OUTPATIENT
Start: 2023-02-07 | End: 2023-03-09

## 2023-02-07 RX ORDER — METRONIDAZOLE 500 MG/1
500 TABLET ORAL EVERY 8 HOURS
Qty: 90 TABLET | Refills: 1 | Status: SHIPPED | OUTPATIENT
Start: 2023-02-07 | End: 2023-03-09

## 2023-02-07 NOTE — PROGRESS NOTES
[facility]  02/07/2023 3:16 PM    Subjective:      Chief Complaint: follow up finger infection      History of Present Illness:    Patient Patti Hernandez is a 47 y.o. female who presents today for hospital follow up. Last saw Dr Magana 1/22.     Reports Shooting pain in finger  Stopped abx last Friday (augmentin)  Area leaking, puss  Saw ortho 1/25  Doesn't recall how injury happened  Finger swollen since having nails done        MRI 1/25  Bone marrow signal changes and cortical indistinctness in the 3rd distal phalanx, concerning for osteomyelitis.     Inflammatory changes in the surrounding soft tissues.  No residual fluid collection.      Specimen Information: Finger, Right Hand; Abscess   1 Result Note  Component 3 wk ago    Aerobic Bacterial Culture  Abnormal   KLEBSIELLA OXYTOCA   Few     Resulting Agency [facility]        Susceptibility     Klebsiella oxytoca     CULTURE, AEROBIC  (SPECIFY SOURCE)     Amox/K Clav'ate <=8/4 mcg/mL Sensitive     Amp/Sulbactam <=8/4 mcg/mL Sensitive     Ampicillin >16 mcg/mL Resistant     Cefazolin >16 mcg/mL Resistant     Cefepime <=2 mcg/mL Sensitive     Ceftriaxone <=1 mcg/mL Sensitive     Ciprofloxacin <=1 mcg/mL Sensitive     Ertapenem <=0.5 mcg/mL Sensitive     Gentamicin <=4 mcg/mL Sensitive     Levofloxacin <=2 mcg/mL Sensitive     Meropenem <=1 mcg/mL Sensitive     Piperacillin/Tazo <=16 mcg/mL Sensitive     Tetracycline <=4 mcg/mL Sensitive     Tobramycin <=4 mcg/mL Sensitive     Trimeth/Sulfa <=2/38 mcg/mL Sensitive               Specimen Information: Finger, Right Hand; Abscess   1 Result Note  Component 3 wk ago    Anaerobic Culture  Abnormal   BACTEROIDES UNIFORMIS   Moderate     Anaerobic Culture  Abnormal   ANAEROBIC COCCI   Moderate   further identified as Ezakiella coagulans             Crp normal 3.5  Esr elevated 95        Review of Symptoms:  Constitutional: Denies fevers, chills, or weakness.  ENT: Denies dysphagia, nasal discharge, ear pain or  "discharge.  Cardiovascular: Denies chest pain, palpitations, orthopnea, or claudication.  Respiratory: Denies shortness of breath, cough, hemoptysis, or wheezing.  GI: Denies nausea/vomitting, hematochezia, melena, abd pain, or changes in appetite.  : Denies dysuria, incontinence, or hematuria.  Musculoskeletal: Denies joint pain or myalgias.  Skin/breast: Denies rashes, lumps, lesions, or discharge.  Neurologic: Denies headache, dizziness, vertigo, or paresthesias.    Past Medical History:   Diagnosis Date    Encounter for blood transfusion     4 per patient report (as of 2021)    Hx of ischemic right ITZ stroke 2017    Iron deficiency anemia     Tobacco use disorder 2021    Uterine fibroid        Past Surgical History:   Procedure Laterality Date     SECTION      x2    tubal ligation         Family History   Problem Relation Age of Onset    Anemia Mother     Stroke Mother     Throat cancer Father     Myasthenia gravis Child     Myasthenia gravis Child        Social History     Socioeconomic History    Marital status: Single   Tobacco Use    Smoking status: Every Day     Packs/day: 1.00     Years: 20.00     Pack years: 20.00     Types: Cigarettes     Start date:     Smokeless tobacco: Never   Substance and Sexual Activity    Alcohol use: Not Currently     Alcohol/week: 1.0 standard drink     Types: 1 Shots of liquor per week     Comment: Quit in     Drug use: No    Sexual activity: Yes     Partners: Male       Review of patient's allergies indicates:  No Known Allergies      Objective:   /73 (BP Location: Left arm)   Pulse 89   Temp 98.5 °F (36.9 °C) (Oral)   Ht 5' 4" (1.626 m)   Wt 80 kg (176 lb 5.9 oz)   LMP 2023   BMI 30.27 kg/m²     General: Afebrile, alert, comfortable, no acute distress.   HEENT: SHIVA. EOMI, no scleral icterus.   Pulmonary: Non labored,clear to auscultation A/P/L.   Cardiac: normal S1 & S2 w/o rubs/murmurs/gallops.   Abdominal: Non-tender, " non-distended.  Extremities: Moves all extremities x 4. No peripheral edema. 2+ pulses.  Skin: clear fluid filled blister at tip of finger, no puss  Neurological:  Alert and oriented x 4.     Labs:    Glucose   Date Value Ref Range Status   01/20/2023 103 70 - 110 mg/dL Final   09/12/2022 101 70 - 110 mg/dL Final   05/31/2022 95 70 - 110 mg/dL Final       Calcium   Date Value Ref Range Status   01/20/2023 9.6 8.7 - 10.5 mg/dL Final   09/12/2022 8.9 8.7 - 10.5 mg/dL Final   05/31/2022 8.9 8.7 - 10.5 mg/dL Final       Albumin   Date Value Ref Range Status   01/20/2023 3.5 3.5 - 5.2 g/dL Final   09/12/2022 3.6 3.5 - 5.2 g/dL Final   05/31/2022 3.6 3.5 - 5.2 g/dL Final       Total Protein   Date Value Ref Range Status   01/20/2023 7.4 6.0 - 8.4 g/dL Final   09/12/2022 7.1 6.0 - 8.4 g/dL Final   05/31/2022 7.4 6.0 - 8.4 g/dL Final       Sodium   Date Value Ref Range Status   01/20/2023 137 136 - 145 mmol/L Final   09/12/2022 137 136 - 145 mmol/L Final   05/31/2022 136 136 - 145 mmol/L Final       Potassium   Date Value Ref Range Status   01/20/2023 4.1 3.5 - 5.1 mmol/L Final   09/12/2022 3.8 3.5 - 5.1 mmol/L Final   05/31/2022 3.5 3.5 - 5.1 mmol/L Final       CO2   Date Value Ref Range Status   01/20/2023 24 23 - 29 mmol/L Final   09/12/2022 26 23 - 29 mmol/L Final   05/31/2022 23 23 - 29 mmol/L Final       Chloride   Date Value Ref Range Status   01/20/2023 106 95 - 110 mmol/L Final   09/12/2022 105 95 - 110 mmol/L Final   05/31/2022 106 95 - 110 mmol/L Final       BUN   Date Value Ref Range Status   01/20/2023 18 6 - 20 mg/dL Final   09/12/2022 13 6 - 20 mg/dL Final   05/31/2022 12 6 - 20 mg/dL Final       Creatinine   Date Value Ref Range Status   01/20/2023 0.8 0.5 - 1.4 mg/dL Final   09/12/2022 0.8 0.5 - 1.4 mg/dL Final   05/31/2022 0.8 0.5 - 1.4 mg/dL Final       Alkaline Phosphatase   Date Value Ref Range Status   01/20/2023 84 55 - 135 U/L Final   09/12/2022 77 55 - 135 U/L Final   05/31/2022 68 55 - 135 U/L  Final       ALT   Date Value Ref Range Status   01/20/2023 23 10 - 44 U/L Final   09/12/2022 21 10 - 44 U/L Final   05/31/2022 11 10 - 44 U/L Final       AST   Date Value Ref Range Status   01/20/2023 19 10 - 40 U/L Final   09/12/2022 34 10 - 40 U/L Final   05/31/2022 16 10 - 40 U/L Final       Total Bilirubin   Date Value Ref Range Status   01/20/2023 0.2 0.1 - 1.0 mg/dL Final     Comment:     For infants and newborns, interpretation of results should be based  on gestational age, weight and in agreement with clinical  observations.    Premature Infant recommended reference ranges:  Up to 24 hours.............<8.0 mg/dL  Up to 48 hours............<12.0 mg/dL  3-5 days..................<15.0 mg/dL  6-29 days.................<15.0 mg/dL     09/12/2022 0.3 0.1 - 1.0 mg/dL Final     Comment:     For infants and newborns, interpretation of results should be based  on gestational age, weight and in agreement with clinical  observations.    Premature Infant recommended reference ranges:  Up to 24 hours.............<8.0 mg/dL  Up to 48 hours............<12.0 mg/dL  3-5 days..................<15.0 mg/dL  6-29 days.................<15.0 mg/dL     05/31/2022 0.3 0.1 - 1.0 mg/dL Final     Comment:     For infants and newborns, interpretation of results should be based  on gestational age, weight and in agreement with clinical  observations.    Premature Infant recommended reference ranges:  Up to 24 hours.............<8.0 mg/dL  Up to 48 hours............<12.0 mg/dL  3-5 days..................<15.0 mg/dL  6-29 days.................<15.0 mg/dL         WBC   Date Value Ref Range Status   01/20/2023 7.91 3.90 - 12.70 K/uL Final   12/27/2022 9.28 3.90 - 12.70 K/uL Final   09/12/2022 7.38 3.90 - 12.70 K/uL Final       Hemoglobin   Date Value Ref Range Status   01/20/2023 7.8 (L) 12.0 - 16.0 g/dL Final   12/27/2022 6.5 (L) 12.0 - 16.0 g/dL Final   09/12/2022 8.3 (L) 12.0 - 16.0 g/dL Final       POC Hematocrit   Date Value Ref Range  Status   09/12/2022 28 (L) 36 - 54 %PCV Final   07/24/2021 22 (L) 36 - 54 %PCV Final     Hematocrit   Date Value Ref Range Status   01/20/2023 27.3 (L) 37.0 - 48.5 % Final   12/27/2022 23.5 (L) 37.0 - 48.5 % Final   09/12/2022 26.1 (L) 37.0 - 48.5 % Final       MCV   Date Value Ref Range Status   01/20/2023 73 (L) 82 - 98 fL Final   12/27/2022 70 (L) 82 - 98 fL Final   09/12/2022 79 (L) 82 - 98 fL Final       Platelets   Date Value Ref Range Status   01/20/2023 252 150 - 450 K/uL Final   12/27/2022 277 150 - 450 K/uL Final   09/12/2022 206 150 - 450 K/uL Final       Lab Results   Component Value Date    CHOL 157 10/27/2021       Lab Results   Component Value Date    HDL 34 (L) 10/27/2021       Lab Results   Component Value Date    LDLCALC 90.6 10/27/2021       Lab Results   Component Value Date    TRIG 162 (H) 10/27/2021       Lab Results   Component Value Date    CHOLHDL 21.7 10/27/2021     No results found for: RPR  No results found for: QUANTIFERON    Medications:  Current Outpatient Medications on File Prior to Visit   Medication Sig Dispense Refill    acetaminophen (TYLENOL) 325 MG tablet Take 2 tablets (650 mg total) by mouth 4 (four) times daily. (Patient not taking: Reported on 1/31/2023) 60 tablet 0    aspirin 81 MG Chew Take 1 tablet (81 mg total) by mouth once daily. 30 tablet 11    atorvastatin (LIPITOR) 40 MG tablet Take 1 tablet (40 mg total) by mouth once daily. 90 tablet 3    clotrimazole (LOTRIMIN) 1 % Crea Place 1 suppository (1 applicator total) vaginally every evening. for 7 days 45 g 3    ferrous sulfate 325 (65 FE) MG EC tablet Take 1 tablet (325 mg total) by mouth 2 (two) times daily. (Patient not taking: Reported on 1/31/2023) 60 tablet 0    ferrous sulfate 325 (65 FE) MG EC tablet Take 1 tablet (325 mg total) by mouth 2 (two) times daily. 90 tablet 5    ibuprofen (ADVIL,MOTRIN) 400 MG tablet Take 1 tablet (400 mg total) by mouth every 4 (four) hours as needed for Other (Pain). (Patient not  taking: Reported on 1/31/2023) 60 tablet 0    nicotine (NICODERM CQ) 21 mg/24 hr Place 1 patch onto the skin once daily. (Patient not taking: Reported on 1/31/2023) 14 patch 0    omeprazole (PRILOSEC) 20 MG capsule Take 1 capsule (20 mg total) by mouth 2 (two) times a day. for 10 days 20 capsule 0    oxyCODONE (ROXICODONE) 5 MG immediate release tablet Take 1 tablet (5 mg total) by mouth every 6 (six) hours as needed for Pain. (Patient not taking: Reported on 1/31/2023) 10 tablet 0    terconazole (TERAZOL 3) 0.8 % vaginal cream Place 1 applicator vaginally every evening. 20 g 1     No current facility-administered medications on file prior to visit.       Antibiotics:   Antibiotics (From admission, onward)      None            HIV: No components found for: HIV 1/2 AG/AB  Hepatitis C IgG: No components found for: HEPATITIS C  Syphilis: No results found for: RPR    Hepatitis A IgG: No components found for: HEPATITIS A IGG  Hepatitis Bc IgG: No components found for: HEPATITIS B CORE IGG  Hepatitis Bs IgG:  Quantiferon: No results found for: QUANTIFERON  VZV IgG: No components found for: VARICELLA IGG    No components found for: SEDIMENTATION RATE  No components found for: C-REACTIVE PROTEIN      Microbiology x 7d:   Microbiology Results (last 7 days)       ** No results found for the last 168 hours. **              There is no immunization history on file for this patient.      Reviewed records today as well as relevant labs, cultures, and imaging    Assessment:       Infection of finger after getting manicure. S/p I&D with cultures with klebsiella and anaerobes. Mri c/f osteomyelitis     No toxicities from antimicrobials  Extremely medically complex    Plan:     Levaquin/flagyl x 6 weeks for OM  F/u with ortho as planned- no clear abscess on exam, but she does have fluid filled blister but defer to ortho if it needs to be lanced    Discussed with patient side effects of quinolones (including but not limited to qtc  prolongation, hypoglycemia, achilles tendon rupture, worsening of aneurysms, c.diff) and these are acceptable risks and preferable to IV medication and risk of picc line complications.  Qtc = 444. Avoid milk products within 2 hours    Rtc 1 month      - Will monitor drug therapy for toxicity      - The following labs were ordered:  Orders Placed This Encounter   Procedures    Comprehensive Metabolic Panel     Standing Status:   Standing     Number of Occurrences:   5     Standing Expiration Date:   4/7/2024    Sedimentation rate     Standing Status:   Standing     Number of Occurrences:   5     Standing Expiration Date:   4/7/2024    C-reactive protein     Standing Status:   Standing     Number of Occurrences:   5     Standing Expiration Date:   4/7/2024           I have sent communication to the referring physician and/or primary care provider.     I spent a total of 30 minutes on the day of the visit. This includes face to face time and non-face to face time preparing to see the patient (eg, review of tests), obtaining and/or reviewing separately obtained history, documenting clinical information in the electronic or other health record, independently interpreting results, and communicating results to the patient/family/caregiver, or care coordination.        The total time for evaluation and management services performed on 02/07/2023 was greater than 30 minutes.       Daniella Sesay MD, MPH  Infectious Disease

## 2023-02-22 ENCOUNTER — LAB VISIT (OUTPATIENT)
Dept: LAB | Facility: HOSPITAL | Age: 48
End: 2023-02-22
Attending: INTERNAL MEDICINE
Payer: MEDICAID

## 2023-02-22 DIAGNOSIS — L03.019 FELON OF FINGER: ICD-10-CM

## 2023-02-22 LAB
ALBUMIN SERPL BCP-MCNC: 3.5 G/DL (ref 3.5–5.2)
ALP SERPL-CCNC: 81 U/L (ref 55–135)
ALT SERPL W/O P-5'-P-CCNC: 13 U/L (ref 10–44)
ANION GAP SERPL CALC-SCNC: 8 MMOL/L (ref 8–16)
AST SERPL-CCNC: 14 U/L (ref 10–40)
BILIRUB SERPL-MCNC: 0.3 MG/DL (ref 0.1–1)
BUN SERPL-MCNC: 11 MG/DL (ref 6–20)
CALCIUM SERPL-MCNC: 9.1 MG/DL (ref 8.7–10.5)
CHLORIDE SERPL-SCNC: 109 MMOL/L (ref 95–110)
CO2 SERPL-SCNC: 25 MMOL/L (ref 23–29)
CREAT SERPL-MCNC: 0.8 MG/DL (ref 0.5–1.4)
CRP SERPL-MCNC: 1.2 MG/L (ref 0–8.2)
ERYTHROCYTE [SEDIMENTATION RATE] IN BLOOD BY PHOTOMETRIC METHOD: 63 MM/HR (ref 0–36)
EST. GFR  (NO RACE VARIABLE): >60 ML/MIN/1.73 M^2
FUNGUS SPEC CULT: NORMAL
GLUCOSE SERPL-MCNC: 139 MG/DL (ref 70–110)
POTASSIUM SERPL-SCNC: 4.1 MMOL/L (ref 3.5–5.1)
PROT SERPL-MCNC: 7.1 G/DL (ref 6–8.4)
SODIUM SERPL-SCNC: 142 MMOL/L (ref 136–145)

## 2023-02-22 PROCEDURE — 36415 COLL VENOUS BLD VENIPUNCTURE: CPT | Performed by: INTERNAL MEDICINE

## 2023-02-22 PROCEDURE — 85652 RBC SED RATE AUTOMATED: CPT | Performed by: INTERNAL MEDICINE

## 2023-02-22 PROCEDURE — 86140 C-REACTIVE PROTEIN: CPT | Performed by: INTERNAL MEDICINE

## 2023-02-22 PROCEDURE — 80053 COMPREHEN METABOLIC PANEL: CPT | Performed by: INTERNAL MEDICINE

## 2023-03-06 LAB
ACID FAST MOD KINY STN SPEC: NORMAL
MYCOBACTERIUM SPEC QL CULT: NORMAL

## 2023-03-09 ENCOUNTER — CLINICAL SUPPORT (OUTPATIENT)
Dept: INFECTIOUS DISEASES | Facility: CLINIC | Age: 48
End: 2023-03-09
Payer: MEDICAID

## 2023-03-09 ENCOUNTER — OFFICE VISIT (OUTPATIENT)
Dept: INFECTIOUS DISEASES | Facility: CLINIC | Age: 48
End: 2023-03-09
Payer: MEDICAID

## 2023-03-09 VITALS
WEIGHT: 176.81 LBS | HEART RATE: 81 BPM | SYSTOLIC BLOOD PRESSURE: 121 MMHG | HEIGHT: 64 IN | BODY MASS INDEX: 30.19 KG/M2 | DIASTOLIC BLOOD PRESSURE: 76 MMHG | TEMPERATURE: 98 F

## 2023-03-09 DIAGNOSIS — L03.019 FELON OF FINGER: Primary | ICD-10-CM

## 2023-03-09 DIAGNOSIS — Z71.85 VACCINE COUNSELING: ICD-10-CM

## 2023-03-09 DIAGNOSIS — M86.9 OSTEOMYELITIS, UNSPECIFIED SITE, UNSPECIFIED TYPE: ICD-10-CM

## 2023-03-09 PROCEDURE — 1159F MED LIST DOCD IN RCRD: CPT | Mod: CPTII,,, | Performed by: INTERNAL MEDICINE

## 2023-03-09 PROCEDURE — 90715 TDAP VACCINE 7 YRS/> IM: CPT | Mod: PBBFAC

## 2023-03-09 PROCEDURE — 99999 PR PBB SHADOW E&M-EST. PATIENT-LVL III: CPT | Mod: PBBFAC,,, | Performed by: INTERNAL MEDICINE

## 2023-03-09 PROCEDURE — 3078F PR MOST RECENT DIASTOLIC BLOOD PRESSURE < 80 MM HG: ICD-10-PCS | Mod: CPTII,,, | Performed by: INTERNAL MEDICINE

## 2023-03-09 PROCEDURE — 3008F BODY MASS INDEX DOCD: CPT | Mod: CPTII,,, | Performed by: INTERNAL MEDICINE

## 2023-03-09 PROCEDURE — 1159F PR MEDICATION LIST DOCUMENTED IN MEDICAL RECORD: ICD-10-PCS | Mod: CPTII,,, | Performed by: INTERNAL MEDICINE

## 2023-03-09 PROCEDURE — 3078F DIAST BP <80 MM HG: CPT | Mod: CPTII,,, | Performed by: INTERNAL MEDICINE

## 2023-03-09 PROCEDURE — 99213 OFFICE O/P EST LOW 20 MIN: CPT | Mod: S$PBB,,, | Performed by: INTERNAL MEDICINE

## 2023-03-09 PROCEDURE — 90471 IMMUNIZATION ADMIN: CPT | Mod: PBBFAC

## 2023-03-09 PROCEDURE — 99213 PR OFFICE/OUTPT VISIT, EST, LEVL III, 20-29 MIN: ICD-10-PCS | Mod: S$PBB,,, | Performed by: INTERNAL MEDICINE

## 2023-03-09 PROCEDURE — 3008F PR BODY MASS INDEX (BMI) DOCUMENTED: ICD-10-PCS | Mod: CPTII,,, | Performed by: INTERNAL MEDICINE

## 2023-03-09 PROCEDURE — 3074F SYST BP LT 130 MM HG: CPT | Mod: CPTII,,, | Performed by: INTERNAL MEDICINE

## 2023-03-09 PROCEDURE — 3074F PR MOST RECENT SYSTOLIC BLOOD PRESSURE < 130 MM HG: ICD-10-PCS | Mod: CPTII,,, | Performed by: INTERNAL MEDICINE

## 2023-03-09 PROCEDURE — 99999 PR PBB SHADOW E&M-EST. PATIENT-LVL III: ICD-10-PCS | Mod: PBBFAC,,, | Performed by: INTERNAL MEDICINE

## 2023-03-09 PROCEDURE — 99213 OFFICE O/P EST LOW 20 MIN: CPT | Mod: PBBFAC | Performed by: INTERNAL MEDICINE

## 2023-03-09 NOTE — PROGRESS NOTES
INFECTIOUS DISEASE CLINIC  03/09/2023 3:16 PM    Subjective:      Chief Complaint: follow up finger infection      History of Present Illness:    Patient Patti Hernandez is a 47 y.o. female who presents today for f/u finger infection. Last seen 2/7. Reports finger swelling started in January after having nails done. Area had been swollen and draining puss. Saw ortho. Treated with augmentin --> levaquin/metronidazole. Finger swelling now down. Nail feel off. Reports compliance with levaquin, but sometimes only took metronidazole twice a day instead of 3x/day. Denies f/c. Says drainage stopped and wound healed/closed.         MRI 1/25  Bone marrow signal changes and cortical indistinctness in the 3rd distal phalanx, concerning for osteomyelitis.     Inflammatory changes in the surrounding soft tissues.  No residual fluid collection.      Specimen Information: Finger, Right Hand; Abscess   1 Result Note  Component 3 wk ago    Aerobic Bacterial Culture  Abnormal   KLEBSIELLA OXYTOCA   Few     Resulting Agency [facility]        Susceptibility     Klebsiella oxytoca     CULTURE, AEROBIC  (SPECIFY SOURCE)     Amox/K Clav'ate <=8/4 mcg/mL Sensitive     Amp/Sulbactam <=8/4 mcg/mL Sensitive     Ampicillin >16 mcg/mL Resistant     Cefazolin >16 mcg/mL Resistant     Cefepime <=2 mcg/mL Sensitive     Ceftriaxone <=1 mcg/mL Sensitive     Ciprofloxacin <=1 mcg/mL Sensitive     Ertapenem <=0.5 mcg/mL Sensitive     Gentamicin <=4 mcg/mL Sensitive     Levofloxacin <=2 mcg/mL Sensitive     Meropenem <=1 mcg/mL Sensitive     Piperacillin/Tazo <=16 mcg/mL Sensitive     Tetracycline <=4 mcg/mL Sensitive     Tobramycin <=4 mcg/mL Sensitive     Trimeth/Sulfa <=2/38 mcg/mL Sensitive               Specimen Information: Finger, Right Hand; Abscess   1 Result Note  Component 3 wk ago    Anaerobic Culture  Abnormal   BACTEROIDES UNIFORMIS   Moderate     Anaerobic Culture  Abnormal   ANAEROBIC COCCI   Moderate   further identified as Ezakiella  "coagulans             Crp normal 3.5  Esr elevated 95        Review of Symptoms:  Constitutional: Denies fevers, chills, or weakness.  ENT: Denies dysphagia, nasal discharge, ear pain or discharge.  Cardiovascular: Denies chest pain, palpitations, orthopnea, or claudication.  Respiratory: Denies shortness of breath, cough, hemoptysis, or wheezing.  GI: Denies nausea/vomitting, hematochezia, melena, abd pain, or changes in appetite.  : Denies dysuria, incontinence, or hematuria.  Musculoskeletal: Denies joint pain or myalgias.  Skin/breast: Denies rashes, lumps, lesions, or discharge.  Neurologic: Denies headache, dizziness, vertigo, or paresthesias.    Past Medical History:   Diagnosis Date    Encounter for blood transfusion     4 per patient report (as of 2021)    Hx of ischemic right ITZ stroke 2017    Iron deficiency anemia     Tobacco use disorder 2021    Uterine fibroid        Past Surgical History:   Procedure Laterality Date     SECTION      x2    tubal ligation         Family History   Problem Relation Age of Onset    Anemia Mother     Stroke Mother     Throat cancer Father     Myasthenia gravis Child     Myasthenia gravis Child        Social History     Socioeconomic History    Marital status: Single   Tobacco Use    Smoking status: Every Day     Packs/day: 1.00     Years: 20.00     Pack years: 20.00     Types: Cigarettes     Start date:     Smokeless tobacco: Never   Substance and Sexual Activity    Alcohol use: Not Currently     Alcohol/week: 1.0 standard drink     Types: 1 Shots of liquor per week     Comment: Quit in     Drug use: No    Sexual activity: Yes     Partners: Male       Review of patient's allergies indicates:  No Known Allergies      Objective:   /76 (BP Location: Right arm)   Pulse 81   Temp 98.2 °F (36.8 °C) (Oral)   Ht 5' 4" (1.626 m)   Wt 80.2 kg (176 lb 12.9 oz)   BMI 30.35 kg/m²     General: Afebrile, alert, comfortable, no acute distress. "   HEENT: SHIVA. EOMI, no scleral icterus.   Pulmonary: Non labored,clear to auscultation A/P/L.   Cardiac: normal S1 & S2 w/o rubs/murmurs/gallops.   Abdominal: Non-tender, non-distended.  Extremities: Moves all extremities x 4. No peripheral edema. 2+ pulses.  Skin: see photo. No areas of drainage.   Neurological:  Alert and oriented x 4.       Labs:    Glucose   Date Value Ref Range Status   02/22/2023 139 (H) 70 - 110 mg/dL Final   01/20/2023 103 70 - 110 mg/dL Final   09/12/2022 101 70 - 110 mg/dL Final       Calcium   Date Value Ref Range Status   02/22/2023 9.1 8.7 - 10.5 mg/dL Final   01/20/2023 9.6 8.7 - 10.5 mg/dL Final   09/12/2022 8.9 8.7 - 10.5 mg/dL Final       Albumin   Date Value Ref Range Status   02/22/2023 3.5 3.5 - 5.2 g/dL Final   01/20/2023 3.5 3.5 - 5.2 g/dL Final   09/12/2022 3.6 3.5 - 5.2 g/dL Final       Total Protein   Date Value Ref Range Status   02/22/2023 7.1 6.0 - 8.4 g/dL Final   01/20/2023 7.4 6.0 - 8.4 g/dL Final   09/12/2022 7.1 6.0 - 8.4 g/dL Final       Sodium   Date Value Ref Range Status   02/22/2023 142 136 - 145 mmol/L Final   01/20/2023 137 136 - 145 mmol/L Final   09/12/2022 137 136 - 145 mmol/L Final       Potassium   Date Value Ref Range Status   02/22/2023 4.1 3.5 - 5.1 mmol/L Final   01/20/2023 4.1 3.5 - 5.1 mmol/L Final   09/12/2022 3.8 3.5 - 5.1 mmol/L Final       CO2   Date Value Ref Range Status   02/22/2023 25 23 - 29 mmol/L Final   01/20/2023 24 23 - 29 mmol/L Final   09/12/2022 26 23 - 29 mmol/L Final       Chloride   Date Value Ref Range Status   02/22/2023 109 95 - 110 mmol/L Final   01/20/2023 106 95 - 110 mmol/L Final   09/12/2022 105 95 - 110 mmol/L Final       BUN   Date Value Ref Range Status   02/22/2023 11 6 - 20 mg/dL Final   01/20/2023 18 6 - 20 mg/dL Final   09/12/2022 13 6 - 20 mg/dL Final       Creatinine   Date Value Ref Range Status   02/22/2023 0.8 0.5 - 1.4 mg/dL Final   01/20/2023 0.8 0.5 - 1.4 mg/dL Final   09/12/2022 0.8 0.5 - 1.4 mg/dL Final        Alkaline Phosphatase   Date Value Ref Range Status   02/22/2023 81 55 - 135 U/L Final   01/20/2023 84 55 - 135 U/L Final   09/12/2022 77 55 - 135 U/L Final       ALT   Date Value Ref Range Status   02/22/2023 13 10 - 44 U/L Final   01/20/2023 23 10 - 44 U/L Final   09/12/2022 21 10 - 44 U/L Final       AST   Date Value Ref Range Status   02/22/2023 14 10 - 40 U/L Final   01/20/2023 19 10 - 40 U/L Final   09/12/2022 34 10 - 40 U/L Final       Total Bilirubin   Date Value Ref Range Status   02/22/2023 0.3 0.1 - 1.0 mg/dL Final     Comment:     For infants and newborns, interpretation of results should be based  on gestational age, weight and in agreement with clinical  observations.    Premature Infant recommended reference ranges:  Up to 24 hours.............<8.0 mg/dL  Up to 48 hours............<12.0 mg/dL  3-5 days..................<15.0 mg/dL  6-29 days.................<15.0 mg/dL     01/20/2023 0.2 0.1 - 1.0 mg/dL Final     Comment:     For infants and newborns, interpretation of results should be based  on gestational age, weight and in agreement with clinical  observations.    Premature Infant recommended reference ranges:  Up to 24 hours.............<8.0 mg/dL  Up to 48 hours............<12.0 mg/dL  3-5 days..................<15.0 mg/dL  6-29 days.................<15.0 mg/dL     09/12/2022 0.3 0.1 - 1.0 mg/dL Final     Comment:     For infants and newborns, interpretation of results should be based  on gestational age, weight and in agreement with clinical  observations.    Premature Infant recommended reference ranges:  Up to 24 hours.............<8.0 mg/dL  Up to 48 hours............<12.0 mg/dL  3-5 days..................<15.0 mg/dL  6-29 days.................<15.0 mg/dL         WBC   Date Value Ref Range Status   01/20/2023 7.91 3.90 - 12.70 K/uL Final   12/27/2022 9.28 3.90 - 12.70 K/uL Final   09/12/2022 7.38 3.90 - 12.70 K/uL Final       Hemoglobin   Date Value Ref Range Status   01/20/2023 7.8 (L)  12.0 - 16.0 g/dL Final   12/27/2022 6.5 (L) 12.0 - 16.0 g/dL Final   09/12/2022 8.3 (L) 12.0 - 16.0 g/dL Final       POC Hematocrit   Date Value Ref Range Status   09/12/2022 28 (L) 36 - 54 %PCV Final   07/24/2021 22 (L) 36 - 54 %PCV Final     Hematocrit   Date Value Ref Range Status   01/20/2023 27.3 (L) 37.0 - 48.5 % Final   12/27/2022 23.5 (L) 37.0 - 48.5 % Final   09/12/2022 26.1 (L) 37.0 - 48.5 % Final       MCV   Date Value Ref Range Status   01/20/2023 73 (L) 82 - 98 fL Final   12/27/2022 70 (L) 82 - 98 fL Final   09/12/2022 79 (L) 82 - 98 fL Final       Platelets   Date Value Ref Range Status   01/20/2023 252 150 - 450 K/uL Final   12/27/2022 277 150 - 450 K/uL Final   09/12/2022 206 150 - 450 K/uL Final       Lab Results   Component Value Date    CHOL 157 10/27/2021       Lab Results   Component Value Date    HDL 34 (L) 10/27/2021       Lab Results   Component Value Date    LDLCALC 90.6 10/27/2021       Lab Results   Component Value Date    TRIG 162 (H) 10/27/2021       Lab Results   Component Value Date    CHOLHDL 21.7 10/27/2021     No results found for: RPR  No results found for: QUANTIFERON    Medications:  Current Outpatient Medications on File Prior to Visit   Medication Sig Dispense Refill    acetaminophen (TYLENOL) 325 MG tablet Take 2 tablets (650 mg total) by mouth 4 (four) times daily. 60 tablet 0    ferrous sulfate 325 (65 FE) MG EC tablet Take 1 tablet (325 mg total) by mouth 2 (two) times daily. 60 tablet 0    ferrous sulfate 325 (65 FE) MG EC tablet Take 1 tablet (325 mg total) by mouth 2 (two) times daily. 90 tablet 5    ibuprofen (ADVIL,MOTRIN) 400 MG tablet Take 1 tablet (400 mg total) by mouth every 4 (four) hours as needed for Other (Pain). 60 tablet 0    levoFLOXacin (LEVAQUIN) 750 MG tablet Take 1 tablet (750 mg total) by mouth once daily. 30 tablet 1    metroNIDAZOLE (FLAGYL) 500 MG tablet Take 1 tablet (500 mg total) by mouth every 8 (eight) hours. 90 tablet 1    terconazole (TERAZOL  3) 0.8 % vaginal cream Place 1 applicator vaginally every evening. 20 g 1    aspirin 81 MG Chew Take 1 tablet (81 mg total) by mouth once daily. 30 tablet 11    atorvastatin (LIPITOR) 40 MG tablet Take 1 tablet (40 mg total) by mouth once daily. 90 tablet 3    omeprazole (PRILOSEC) 20 MG capsule Take 1 capsule (20 mg total) by mouth 2 (two) times a day. for 10 days 20 capsule 0     No current facility-administered medications on file prior to visit.       Antibiotics:   Antibiotics (From admission, onward)      None            HIV: No components found for: HIV 1/2 AG/AB  Hepatitis C IgG: No components found for: HEPATITIS C  Syphilis: No results found for: RPR    Hepatitis A IgG: No components found for: HEPATITIS A IGG  Hepatitis Bc IgG: No components found for: HEPATITIS B CORE IGG  Hepatitis Bs IgG:  Quantiferon: No results found for: QUANTIFERON  VZV IgG: No components found for: VARICELLA IGG    No components found for: SEDIMENTATION RATE  No components found for: C-REACTIVE PROTEIN      Microbiology x 7d:   Microbiology Results (last 7 days)       ** No results found for the last 168 hours. **              There is no immunization history on file for this patient.      Reviewed records today as well as relevant labs, cultures, and imaging    Assessment:       Infection of finger after getting manicure. S/p I&D with cultures with klebsiella and anaerobes. Mri c/f osteomyelitis. Completed about 6 weeks of augmentin --> levaquin/metronidazole. Wound now closed. No swelling or drainage.     No toxicities from antimicrobials  Extremely medically complex    Plan:     Stop antibiotics    TdaP vaccine      - Will monitor drug therapy for toxicity      - The following labs were ordered:  Orders Placed This Encounter   Procedures    (In Office Administered) Tdap Vaccine     Standing Status:   Future     Number of Occurrences:   1     Standing Expiration Date:   3/9/2024         I have sent communication to the referring  physician and/or primary care provider.     I spent a total of 20 minutes on the day of the visit. This includes face to face time and non-face to face time preparing to see the patient (eg, review of tests), obtaining and/or reviewing separately obtained history, documenting clinical information in the electronic or other health record, independently interpreting results, and communicating results to the patient/family/caregiver, or care coordination.        The total time for evaluation and management services performed on 03/09/2023 was greater than 20 minutes.       Daniella Sesay MD, MPH  Infectious Disease

## 2023-04-04 ENCOUNTER — PATIENT MESSAGE (OUTPATIENT)
Dept: RESEARCH | Facility: HOSPITAL | Age: 48
End: 2023-04-04
Payer: MEDICAID

## 2023-07-25 ENCOUNTER — HOSPITAL ENCOUNTER (OUTPATIENT)
Facility: HOSPITAL | Age: 48
Discharge: HOME | End: 2023-07-26
Attending: EMERGENCY MEDICINE | Admitting: EMERGENCY MEDICINE
Payer: MEDICAID

## 2023-07-25 DIAGNOSIS — N93.9 VAGINAL BLEEDING: ICD-10-CM

## 2023-07-25 DIAGNOSIS — R07.9 CHEST PAIN: ICD-10-CM

## 2023-07-25 DIAGNOSIS — D64.9 SYMPTOMATIC ANEMIA: Primary | ICD-10-CM

## 2023-07-25 PROBLEM — F17.210 CIGARETTE NICOTINE DEPENDENCE WITHOUT COMPLICATION: Status: ACTIVE | Noted: 2023-07-25

## 2023-07-25 LAB
ABO + RH BLD: NORMAL
ALBUMIN SERPL BCP-MCNC: 3.5 G/DL (ref 3.5–5.2)
ALP SERPL-CCNC: 82 U/L (ref 55–135)
ALT SERPL W/O P-5'-P-CCNC: 13 U/L (ref 10–44)
ANION GAP SERPL CALC-SCNC: 11 MMOL/L (ref 8–16)
AST SERPL-CCNC: 17 U/L (ref 10–40)
B-HCG UR QL: NEGATIVE
BACTERIA #/AREA URNS AUTO: ABNORMAL /HPF
BASOPHILS # BLD AUTO: 0.02 K/UL (ref 0–0.2)
BASOPHILS NFR BLD: 0.2 % (ref 0–1.9)
BILIRUB SERPL-MCNC: 0.2 MG/DL (ref 0.1–1)
BILIRUB UR QL STRIP: NEGATIVE
BLD GP AB SCN CELLS X3 SERPL QL: NORMAL
BUN SERPL-MCNC: 11 MG/DL (ref 6–20)
CALCIUM SERPL-MCNC: 8.9 MG/DL (ref 8.7–10.5)
CHLORIDE SERPL-SCNC: 107 MMOL/L (ref 95–110)
CLARITY UR REFRACT.AUTO: CLEAR
CO2 SERPL-SCNC: 22 MMOL/L (ref 23–29)
COLOR UR AUTO: YELLOW
CREAT SERPL-MCNC: 0.8 MG/DL (ref 0.5–1.4)
CTP QC/QA: YES
DIFFERENTIAL METHOD: ABNORMAL
EOSINOPHIL # BLD AUTO: 0.1 K/UL (ref 0–0.5)
EOSINOPHIL NFR BLD: 1.1 % (ref 0–8)
ERYTHROCYTE [DISTWIDTH] IN BLOOD BY AUTOMATED COUNT: 23.2 % (ref 11.5–14.5)
EST. GFR  (NO RACE VARIABLE): >60 ML/MIN/1.73 M^2
GLUCOSE SERPL-MCNC: 145 MG/DL (ref 70–110)
GLUCOSE UR QL STRIP: NEGATIVE
HCT VFR BLD AUTO: 21.9 % (ref 37–48.5)
HGB BLD-MCNC: 6.1 G/DL (ref 12–16)
HGB UR QL STRIP: ABNORMAL
HYALINE CASTS UR QL AUTO: 0 /LPF
IMM GRANULOCYTES # BLD AUTO: 0.03 K/UL (ref 0–0.04)
IMM GRANULOCYTES NFR BLD AUTO: 0.3 % (ref 0–0.5)
KETONES UR QL STRIP: NEGATIVE
LEUKOCYTE ESTERASE UR QL STRIP: NEGATIVE
LYMPHOCYTES # BLD AUTO: 3.1 K/UL (ref 1–4.8)
LYMPHOCYTES NFR BLD: 35.1 % (ref 18–48)
MCH RBC QN AUTO: 18.8 PG (ref 27–31)
MCHC RBC AUTO-ENTMCNC: 27.9 G/DL (ref 32–36)
MCV RBC AUTO: 68 FL (ref 82–98)
MICROSCOPIC COMMENT: ABNORMAL
MONOCYTES # BLD AUTO: 0.5 K/UL (ref 0.3–1)
MONOCYTES NFR BLD: 5.4 % (ref 4–15)
NEUTROPHILS # BLD AUTO: 5.2 K/UL (ref 1.8–7.7)
NEUTROPHILS NFR BLD: 57.9 % (ref 38–73)
NITRITE UR QL STRIP: NEGATIVE
NRBC BLD-RTO: 0 /100 WBC
PH UR STRIP: 7 [PH] (ref 5–8)
PLATELET # BLD AUTO: 237 K/UL (ref 150–450)
PMV BLD AUTO: ABNORMAL FL (ref 9.2–12.9)
POTASSIUM SERPL-SCNC: 3.7 MMOL/L (ref 3.5–5.1)
PROT SERPL-MCNC: 7 G/DL (ref 6–8.4)
PROT UR QL STRIP: ABNORMAL
RBC # BLD AUTO: 3.24 M/UL (ref 4–5.4)
RBC #/AREA URNS AUTO: >100 /HPF (ref 0–4)
SODIUM SERPL-SCNC: 140 MMOL/L (ref 136–145)
SP GR UR STRIP: 1.03 (ref 1–1.03)
SPECIMEN OUTDATE: NORMAL
SQUAMOUS #/AREA URNS AUTO: 13 /HPF
TROPONIN I SERPL DL<=0.01 NG/ML-MCNC: <0.006 NG/ML (ref 0–0.03)
URN SPEC COLLECT METH UR: ABNORMAL
WBC # BLD AUTO: 8.94 K/UL (ref 3.9–12.7)
WBC #/AREA URNS AUTO: 1 /HPF (ref 0–5)

## 2023-07-25 PROCEDURE — 84484 ASSAY OF TROPONIN QUANT: CPT | Performed by: EMERGENCY MEDICINE

## 2023-07-25 PROCEDURE — 80053 COMPREHEN METABOLIC PANEL: CPT | Performed by: EMERGENCY MEDICINE

## 2023-07-25 PROCEDURE — 85025 COMPLETE CBC W/AUTO DIFF WBC: CPT | Performed by: EMERGENCY MEDICINE

## 2023-07-25 PROCEDURE — 99223 1ST HOSP IP/OBS HIGH 75: CPT | Mod: ,,,

## 2023-07-25 PROCEDURE — 99223 PR INITIAL HOSPITAL CARE,LEVL III: ICD-10-PCS | Mod: ,,,

## 2023-07-25 PROCEDURE — 81001 URINALYSIS AUTO W/SCOPE: CPT | Performed by: EMERGENCY MEDICINE

## 2023-07-25 PROCEDURE — 93005 ELECTROCARDIOGRAM TRACING: CPT

## 2023-07-25 PROCEDURE — 86920 COMPATIBILITY TEST SPIN: CPT | Performed by: EMERGENCY MEDICINE

## 2023-07-25 PROCEDURE — 93010 ELECTROCARDIOGRAM REPORT: CPT | Mod: ,,, | Performed by: INTERNAL MEDICINE

## 2023-07-25 PROCEDURE — G0378 HOSPITAL OBSERVATION PER HR: HCPCS

## 2023-07-25 PROCEDURE — 93010 EKG 12-LEAD: ICD-10-PCS | Mod: ,,, | Performed by: INTERNAL MEDICINE

## 2023-07-25 PROCEDURE — 81025 URINE PREGNANCY TEST: CPT | Performed by: EMERGENCY MEDICINE

## 2023-07-25 PROCEDURE — 99285 EMERGENCY DEPT VISIT HI MDM: CPT | Mod: 25

## 2023-07-25 PROCEDURE — 86900 BLOOD TYPING SEROLOGIC ABO: CPT | Performed by: EMERGENCY MEDICINE

## 2023-07-25 RX ORDER — ATORVASTATIN CALCIUM 40 MG/1
40 TABLET, FILM COATED ORAL DAILY
Status: DISCONTINUED | OUTPATIENT
Start: 2023-07-26 | End: 2023-07-26 | Stop reason: HOSPADM

## 2023-07-25 RX ORDER — SODIUM CHLORIDE 0.9 % (FLUSH) 0.9 %
10 SYRINGE (ML) INJECTION
Status: DISCONTINUED | OUTPATIENT
Start: 2023-07-25 | End: 2023-07-26 | Stop reason: HOSPADM

## 2023-07-25 RX ORDER — ONDANSETRON 8 MG/1
8 TABLET, ORALLY DISINTEGRATING ORAL EVERY 8 HOURS PRN
Status: DISCONTINUED | OUTPATIENT
Start: 2023-07-25 | End: 2023-07-26 | Stop reason: HOSPADM

## 2023-07-25 RX ORDER — ACETAMINOPHEN 500 MG
1000 TABLET ORAL EVERY 8 HOURS PRN
Status: DISCONTINUED | OUTPATIENT
Start: 2023-07-25 | End: 2023-07-26 | Stop reason: HOSPADM

## 2023-07-25 RX ORDER — LANOLIN ALCOHOL/MO/W.PET/CERES
1 CREAM (GRAM) TOPICAL 2 TIMES DAILY
Status: DISCONTINUED | OUTPATIENT
Start: 2023-07-25 | End: 2023-07-26 | Stop reason: HOSPADM

## 2023-07-25 RX ORDER — NALOXONE HCL 0.4 MG/ML
0.02 VIAL (ML) INJECTION
Status: DISCONTINUED | OUTPATIENT
Start: 2023-07-25 | End: 2023-07-26 | Stop reason: HOSPADM

## 2023-07-25 RX ORDER — GLUCAGON 1 MG
1 KIT INJECTION
Status: DISCONTINUED | OUTPATIENT
Start: 2023-07-25 | End: 2023-07-26 | Stop reason: HOSPADM

## 2023-07-25 RX ORDER — IPRATROPIUM BROMIDE AND ALBUTEROL SULFATE 2.5; .5 MG/3ML; MG/3ML
3 SOLUTION RESPIRATORY (INHALATION) EVERY 4 HOURS PRN
Status: DISCONTINUED | OUTPATIENT
Start: 2023-07-25 | End: 2023-07-26 | Stop reason: HOSPADM

## 2023-07-25 RX ORDER — TALC
6 POWDER (GRAM) TOPICAL NIGHTLY PRN
Status: DISCONTINUED | OUTPATIENT
Start: 2023-07-25 | End: 2023-07-26 | Stop reason: HOSPADM

## 2023-07-25 RX ORDER — ACETAMINOPHEN 325 MG/1
650 TABLET ORAL EVERY 4 HOURS PRN
Status: DISCONTINUED | OUTPATIENT
Start: 2023-07-25 | End: 2023-07-26 | Stop reason: HOSPADM

## 2023-07-25 RX ORDER — SIMETHICONE 80 MG
1 TABLET,CHEWABLE ORAL 4 TIMES DAILY PRN
Status: DISCONTINUED | OUTPATIENT
Start: 2023-07-25 | End: 2023-07-26 | Stop reason: HOSPADM

## 2023-07-25 RX ORDER — POLYETHYLENE GLYCOL 3350 17 G/17G
17 POWDER, FOR SOLUTION ORAL 2 TIMES DAILY PRN
Status: DISCONTINUED | OUTPATIENT
Start: 2023-07-25 | End: 2023-07-26 | Stop reason: HOSPADM

## 2023-07-25 RX ORDER — IBUPROFEN 200 MG
24 TABLET ORAL
Status: DISCONTINUED | OUTPATIENT
Start: 2023-07-25 | End: 2023-07-26 | Stop reason: HOSPADM

## 2023-07-25 RX ORDER — SODIUM CHLORIDE 0.9 % (FLUSH) 0.9 %
5 SYRINGE (ML) INJECTION
Status: DISCONTINUED | OUTPATIENT
Start: 2023-07-25 | End: 2023-07-26 | Stop reason: HOSPADM

## 2023-07-25 RX ORDER — MAG HYDROX/ALUMINUM HYD/SIMETH 200-200-20
30 SUSPENSION, ORAL (FINAL DOSE FORM) ORAL 4 TIMES DAILY PRN
Status: DISCONTINUED | OUTPATIENT
Start: 2023-07-25 | End: 2023-07-26 | Stop reason: HOSPADM

## 2023-07-25 RX ORDER — BISACODYL 10 MG
10 SUPPOSITORY, RECTAL RECTAL DAILY PRN
Status: DISCONTINUED | OUTPATIENT
Start: 2023-07-25 | End: 2023-07-26 | Stop reason: HOSPADM

## 2023-07-25 RX ORDER — PROCHLORPERAZINE EDISYLATE 5 MG/ML
5 INJECTION INTRAMUSCULAR; INTRAVENOUS EVERY 6 HOURS PRN
Status: DISCONTINUED | OUTPATIENT
Start: 2023-07-25 | End: 2023-07-26 | Stop reason: HOSPADM

## 2023-07-25 RX ORDER — HYDROCODONE BITARTRATE AND ACETAMINOPHEN 500; 5 MG/1; MG/1
TABLET ORAL
Status: DISCONTINUED | OUTPATIENT
Start: 2023-07-25 | End: 2023-07-26 | Stop reason: HOSPADM

## 2023-07-25 RX ORDER — IBUPROFEN 200 MG
16 TABLET ORAL
Status: DISCONTINUED | OUTPATIENT
Start: 2023-07-25 | End: 2023-07-26 | Stop reason: HOSPADM

## 2023-07-25 RX ORDER — TALC
6 POWDER (GRAM) TOPICAL NIGHTLY PRN
Status: DISCONTINUED | OUTPATIENT
Start: 2023-07-25 | End: 2023-07-25 | Stop reason: SDUPTHER

## 2023-07-26 VITALS
HEART RATE: 93 BPM | RESPIRATION RATE: 14 BRPM | HEIGHT: 64 IN | WEIGHT: 184.06 LBS | OXYGEN SATURATION: 98 % | DIASTOLIC BLOOD PRESSURE: 59 MMHG | BODY MASS INDEX: 31.42 KG/M2 | TEMPERATURE: 98 F | SYSTOLIC BLOOD PRESSURE: 128 MMHG

## 2023-07-26 LAB
BASOPHILS # BLD AUTO: 0.03 K/UL (ref 0–0.2)
BASOPHILS NFR BLD: 0.5 % (ref 0–1.9)
BLD PROD TYP BPU: NORMAL
BLOOD UNIT EXPIRATION DATE: NORMAL
BLOOD UNIT TYPE CODE: 5100
BLOOD UNIT TYPE: NORMAL
CHOLEST SERPL-MCNC: 145 MG/DL (ref 120–199)
CHOLEST/HDLC SERPL: 4.5 {RATIO} (ref 2–5)
CODING SYSTEM: NORMAL
CROSSMATCH INTERPRETATION: NORMAL
DIFFERENTIAL METHOD: ABNORMAL
DISPENSE STATUS: NORMAL
EOSINOPHIL # BLD AUTO: 0.1 K/UL (ref 0–0.5)
EOSINOPHIL NFR BLD: 1.7 % (ref 0–8)
ERYTHROCYTE [DISTWIDTH] IN BLOOD BY AUTOMATED COUNT: 25.3 % (ref 11.5–14.5)
HCT VFR BLD AUTO: 24.5 % (ref 37–48.5)
HDLC SERPL-MCNC: 32 MG/DL (ref 40–75)
HDLC SERPL: 22.1 % (ref 20–50)
HGB BLD-MCNC: 7.1 G/DL (ref 12–16)
IMM GRANULOCYTES # BLD AUTO: 0.02 K/UL (ref 0–0.04)
IMM GRANULOCYTES NFR BLD AUTO: 0.3 % (ref 0–0.5)
LDLC SERPL CALC-MCNC: 77.6 MG/DL (ref 63–159)
LYMPHOCYTES # BLD AUTO: 1.9 K/UL (ref 1–4.8)
LYMPHOCYTES NFR BLD: 30.6 % (ref 18–48)
MCH RBC QN AUTO: 20.5 PG (ref 27–31)
MCHC RBC AUTO-ENTMCNC: 29 G/DL (ref 32–36)
MCV RBC AUTO: 71 FL (ref 82–98)
MONOCYTES # BLD AUTO: 0.4 K/UL (ref 0.3–1)
MONOCYTES NFR BLD: 6.8 % (ref 4–15)
NEUTROPHILS # BLD AUTO: 3.8 K/UL (ref 1.8–7.7)
NEUTROPHILS NFR BLD: 60.1 % (ref 38–73)
NONHDLC SERPL-MCNC: 113 MG/DL
NRBC BLD-RTO: 0 /100 WBC
NUM UNITS TRANS PACKED RBC: NORMAL
PLATELET # BLD AUTO: 203 K/UL (ref 150–450)
PMV BLD AUTO: ABNORMAL FL (ref 9.2–12.9)
RBC # BLD AUTO: 3.47 M/UL (ref 4–5.4)
TRIGL SERPL-MCNC: 177 MG/DL (ref 30–150)
WBC # BLD AUTO: 6.35 K/UL (ref 3.9–12.7)

## 2023-07-26 PROCEDURE — P9016 RBC LEUKOCYTES REDUCED: HCPCS | Performed by: EMERGENCY MEDICINE

## 2023-07-26 PROCEDURE — 80061 LIPID PANEL: CPT

## 2023-07-26 PROCEDURE — G0378 HOSPITAL OBSERVATION PER HR: HCPCS

## 2023-07-26 PROCEDURE — 99239 HOSP IP/OBS DSCHRG MGMT >30: CPT | Mod: ,,, | Performed by: PHYSICIAN ASSISTANT

## 2023-07-26 PROCEDURE — 36415 COLL VENOUS BLD VENIPUNCTURE: CPT

## 2023-07-26 PROCEDURE — 85025 COMPLETE CBC W/AUTO DIFF WBC: CPT

## 2023-07-26 PROCEDURE — 25000003 PHARM REV CODE 250

## 2023-07-26 PROCEDURE — 36430 TRANSFUSION BLD/BLD COMPNT: CPT

## 2023-07-26 PROCEDURE — 99239 PR HOSPITAL DISCHARGE DAY,>30 MIN: ICD-10-PCS | Mod: ,,, | Performed by: PHYSICIAN ASSISTANT

## 2023-07-26 RX ADMIN — ATORVASTATIN CALCIUM 40 MG: 40 TABLET, FILM COATED ORAL at 09:07

## 2023-07-26 RX ADMIN — FERROUS SULFATE TAB EC 325 MG (65 MG FE EQUIVALENT) 1 EACH: 325 (65 FE) TABLET DELAYED RESPONSE at 09:07

## 2023-07-26 NOTE — HOSPITAL COURSE
She is in obs for menorrhagia. She is afebrile and HDS. Transvaginal/Abdominal US resulted in no sonographic abnormalities. Received 1 unit PRBC. RBC uptrending after treatment  HCT: 21.9 -->24.5. H.1 --> 7.1. Pt w/o signs of symptomatic anemia and medically ready for discharge home. Okay to continue aspirin for CVA. Pt instructed to make urgent follow up with outpatient OB/GYN. Pt educated on hospital course and plan, verbally agrees and understands. All questions answered.

## 2023-07-26 NOTE — ED TRIAGE NOTES
Intermittent vaginal bleeding x2 weeks that is worse today. Pt says she has been going through 1 pad per hour most of the day. C/o fatigue. Denies pain

## 2023-07-26 NOTE — SUBJECTIVE & OBJECTIVE
Past Medical History:   Diagnosis Date    Encounter for blood transfusion     4 per patient report (as of 2021)    Hx of ischemic right ITZ stroke 2017    Iron deficiency anemia     Tobacco use disorder 2021    Uterine fibroid        Past Surgical History:   Procedure Laterality Date     SECTION      x2    tubal ligation         Review of patient's allergies indicates:  No Known Allergies    No current facility-administered medications on file prior to encounter.     Current Outpatient Medications on File Prior to Encounter   Medication Sig    acetaminophen (TYLENOL) 325 MG tablet Take 2 tablets (650 mg total) by mouth 4 (four) times daily.    aspirin 81 MG Chew Take 1 tablet (81 mg total) by mouth once daily.    atorvastatin (LIPITOR) 40 MG tablet Take 1 tablet (40 mg total) by mouth once daily.    ferrous sulfate 325 (65 FE) MG EC tablet Take 1 tablet (325 mg total) by mouth 2 (two) times daily.    ferrous sulfate 325 (65 FE) MG EC tablet Take 1 tablet (325 mg total) by mouth 2 (two) times daily.    ibuprofen (ADVIL,MOTRIN) 400 MG tablet Take 1 tablet (400 mg total) by mouth every 4 (four) hours as needed for Other (Pain).    omeprazole (PRILOSEC) 20 MG capsule Take 1 capsule (20 mg total) by mouth 2 (two) times a day. for 10 days    terconazole (TERAZOL 3) 0.8 % vaginal cream Place 1 applicator vaginally every evening.     Family History       Problem Relation (Age of Onset)    Anemia Mother    Myasthenia gravis Child, Child    Stroke Mother    Throat cancer Father          Tobacco Use    Smoking status: Every Day     Packs/day: 1.00     Years: 20.00     Pack years: 20.00     Types: Cigarettes     Start date:     Smokeless tobacco: Never   Substance and Sexual Activity    Alcohol use: Not Currently     Alcohol/week: 1.0 standard drink     Types: 1 Shots of liquor per week     Comment: Quit in     Drug use: No    Sexual activity: Yes     Partners: Male     Review of Systems    Constitutional:  Negative for chills and fatigue.   HENT:  Negative for trouble swallowing.    Eyes:  Negative for visual disturbance.   Respiratory:  Negative for cough and shortness of breath.    Cardiovascular:  Negative for chest pain and leg swelling.   Gastrointestinal:  Positive for nausea. Negative for abdominal pain, blood in stool and vomiting.   Genitourinary:  Positive for vaginal bleeding. Negative for dysuria, flank pain and pelvic pain.   Musculoskeletal:  Negative for arthralgias and back pain.   Skin:  Negative for rash and wound.   Neurological:  Positive for weakness and light-headedness.   Psychiatric/Behavioral:  Negative for confusion. The patient is not nervous/anxious.    Objective:     Vital Signs (Most Recent):  Temp: 98.7 °F (37.1 °C) (07/25/23 1820)  Pulse: 85 (07/25/23 1820)  Resp: 16 (07/25/23 1820)  BP: 118/64 (07/25/23 1820)  SpO2: 98 % (07/25/23 1820) Vital Signs (24h Range):  Temp:  [98.7 °F (37.1 °C)-99.2 °F (37.3 °C)] 98.7 °F (37.1 °C)  Pulse:  [85-91] 85  Resp:  [16-18] 16  SpO2:  [98 %-100 %] 98 %  BP: (118-128)/(62-64) 118/64     Weight: 83.9 kg (185 lb)  Body mass index is 31.76 kg/m².     Physical Exam  Vitals and nursing note reviewed.   Constitutional:       General: She is not in acute distress.     Appearance: She is obese.   HENT:      Head: Normocephalic and atraumatic.      Nose: Nose normal.      Mouth/Throat:      Pharynx: No oropharyngeal exudate.   Eyes:      Extraocular Movements: Extraocular movements intact.      Comments: Conjunctival pallor   Cardiovascular:      Rate and Rhythm: Normal rate and regular rhythm.   Pulmonary:      Effort: Pulmonary effort is normal. No respiratory distress.   Abdominal:      General: Bowel sounds are normal. There is no distension.      Palpations: Abdomen is soft.      Tenderness: There is no abdominal tenderness.   Musculoskeletal:         General: Normal range of motion.      Cervical back: Normal range of motion.       Comments: Mild BLE swelling   Skin:     General: Skin is warm and dry.      Capillary Refill: Capillary refill takes 2 to 3 seconds.   Neurological:      General: No focal deficit present.      Mental Status: She is alert and oriented to person, place, and time.   Psychiatric:         Mood and Affect: Mood normal.         Thought Content: Thought content normal.              Significant Labs: All pertinent labs within the past 24 hours have been reviewed.  CBC:   Recent Labs   Lab 07/25/23  1822   WBC 8.94   HGB 6.1*   HCT 21.9*        CMP:   Recent Labs   Lab 07/25/23  1822      K 3.7      CO2 22*   *   BUN 11   CREATININE 0.8   CALCIUM 8.9   PROT 7.0   ALBUMIN 3.5   BILITOT 0.2   ALKPHOS 82   AST 17   ALT 13   ANIONGAP 11       Significant Imaging: I have reviewed all pertinent imaging results/findings within the past 24 hours.

## 2023-07-26 NOTE — HPI
Patti Hernandez is a 47 y.o. with PMHx ischemic right ITZ stroke (on aspirin), ALO, uterine fibroids, blood transfusions presenting to the ED for 2 weeks of heavy vaginal bleeding. She estimates she has gone through ~100 pads in the last two weeks. She endorses associated lightheadedness, nausea, and weakness today prompting her to present to the ED. She last saw her gynecologist for this in January and they had discussed hysterectomy, but she states she then skipped a period for about 3 months so she felt that she may not need to follow up just yet. Pelvic US was ordered that visit but not completed.  She denies any chest pain, SOB, headache, visual changes, syncope, or bleeding elsewhere. She takes iron at home as well as aspirin.    In the ED, AFVSS. Hbg 6.1 and hematocrit 21.9. Baseline hbg ~6.5-8.0. CBC and CMP otherwise unremarkable. UPT negative.  1 unit PRBCs and pelvic US ordered. Admitted to  for symptomatic anemia, vaginal bleeding.

## 2023-07-26 NOTE — NURSING
Morning med pass and rounding completed on pt. Pts VSS and is in NAD at this time. No reports of SOB or pain. POC explained to pt and pt expressing no further needs at this time. Phlebotomy collecting labs at this time.

## 2023-07-26 NOTE — PLAN OF CARE
[facility]  Report of Inpatient Wound Care Consultation     Danny Whelan Patient Status:  Inpatient    1961 MRN [id number]   [facility] 4SW-A Attending Yehuda Zamudio MD   Hosp Day # 8 PCP Jose Alberto Weiss MD     [facility] Discharge instructions, diagnosis, and follow up discussed with patient. Patient verbalized understanding. All questions and concerns answered. No needs expressed at the time. Pt is awake, alert and oriented with no acute distress noted. Respirations even and unlabored. Pt offered escort services, however, pt declines. Ambulatory off the floor.     Problem: Adult Inpatient Plan of Care  Goal: Plan of Care Review  Outcome: Met  Goal: Patient-Specific Goal (Individualized)  Outcome: Met  Goal: Absence of Hospital-Acquired Illness or Injury  Outcome: Met  Goal: Optimal Comfort and Wellbeing  Outcome: Met  Goal: Readiness for Transition of Care  Outcome: Met     Problem: Infection  Goal: Absence of Infection Signs and Symptoms  Outcome: Met     Problem: Fatigue  Goal: Improved Activity Tolerance  Outcome: Met     Problem: Anemia  Goal: Anemia Symptom Improvement  Outcome: Met      7.9*   --   8.1*   --   8.3*   --    --   8.8   --    --    ALB   --    < >  1.3*   --    --    --   1.3*   --    --   1.3*   --    --    TP   --    < >  6.2*   --    --    --   6.5   --    --   6.4   --    --    B12  1,057*   --    --    --    --    -- 1-2x/week for debridement to the back wound. Discharge planning to LTAC noted. Thank you for this consultation and for allowing me to participate in the care of your patient.   Please call me at [phone] or page me at [phone] if you have any questions ab

## 2023-07-26 NOTE — H&P
[facility] Dept  [facility] Medicine  History & Physical    Patient Name: Patti Hernandez  MRN: [id number]  Patient Class: OP- Observation  Admission Date: 2023  Attending Physician: Kulwinder Krause MD   Primary Care Provider: Megha Brar NP         Patient information was obtained from patient, past medical records and ER records.     Subjective:     Principal Problem:Symptomatic anemia    Chief Complaint:   Chief Complaint   Patient presents with    Vaginal Bleeding     Period for 2 weeks, starting to feel weak, used 2 pads        HPI: Patti Hernandez is a 47 y.o. with PMHx ischemic right ITZ stroke (on aspirin), ALO, uterine fibroids, blood transfusions presenting to the ED for 2 weeks of heavy vaginal bleeding. She estimates she has gone through ~100 pads in the last two weeks. She endorses associated lightheadedness, nausea, and weakness today prompting her to present to the ED. She last saw her gynecologist for this in January and they had discussed hysterectomy, but she states she then skipped a period for about 3 months so she felt that she may not need to follow up just yet. Pelvic US was ordered that visit but not completed.  She denies any chest pain, SOB, headache, visual changes, syncope, or bleeding elsewhere. She takes iron at home as well as aspirin.    In the ED, AFVSS. Hbg 6.1 and hematocrit 21.9. Baseline hbg ~6.5-8.0. CBC and CMP otherwise unremarkable. UPT negative.  1 unit PRBCs and pelvic US ordered. Admitted to  for symptomatic anemia, vaginal bleeding.      Past Medical History:   Diagnosis Date    Encounter for blood transfusion     4 per patient report (as of 2021)    Hx of ischemic right ITZ stroke 2017    Iron deficiency anemia     Tobacco use disorder 2021    Uterine fibroid        Past Surgical History:   Procedure Laterality Date     SECTION      x2    tubal ligation         Review of patient's allergies indicates:  No Known Allergies    No  current facility-administered medications on file prior to encounter.     Current Outpatient Medications on File Prior to Encounter   Medication Sig    acetaminophen (TYLENOL) 325 MG tablet Take 2 tablets (650 mg total) by mouth 4 (four) times daily.    aspirin 81 MG Chew Take 1 tablet (81 mg total) by mouth once daily.    atorvastatin (LIPITOR) 40 MG tablet Take 1 tablet (40 mg total) by mouth once daily.    ferrous sulfate 325 (65 FE) MG EC tablet Take 1 tablet (325 mg total) by mouth 2 (two) times daily.    ferrous sulfate 325 (65 FE) MG EC tablet Take 1 tablet (325 mg total) by mouth 2 (two) times daily.    ibuprofen (ADVIL,MOTRIN) 400 MG tablet Take 1 tablet (400 mg total) by mouth every 4 (four) hours as needed for Other (Pain).    omeprazole (PRILOSEC) 20 MG capsule Take 1 capsule (20 mg total) by mouth 2 (two) times a day. for 10 days    terconazole (TERAZOL 3) 0.8 % vaginal cream Place 1 applicator vaginally every evening.     Family History       Problem Relation (Age of Onset)    Anemia Mother    Myasthenia gravis Child, Child    Stroke Mother    Throat cancer Father          Tobacco Use    Smoking status: Every Day     Packs/day: 1.00     Years: 20.00     Pack years: 20.00     Types: Cigarettes     Start date: 2003    Smokeless tobacco: Never   Substance and Sexual Activity    Alcohol use: Not Currently     Alcohol/week: 1.0 standard drink     Types: 1 Shots of liquor per week     Comment: Quit in 2020    Drug use: No    Sexual activity: Yes     Partners: Male     Review of Systems   Constitutional:  Negative for chills and fatigue.   HENT:  Negative for trouble swallowing.    Eyes:  Negative for visual disturbance.   Respiratory:  Negative for cough and shortness of breath.    Cardiovascular:  Negative for chest pain and leg swelling.   Gastrointestinal:  Positive for nausea. Negative for abdominal pain, blood in stool and vomiting.   Genitourinary:  Positive for vaginal bleeding. Negative  for dysuria, flank pain and pelvic pain.   Musculoskeletal:  Negative for arthralgias and back pain.   Skin:  Negative for rash and wound.   Neurological:  Positive for weakness and light-headedness.   Psychiatric/Behavioral:  Negative for confusion. The patient is not nervous/anxious.    Objective:     Vital Signs (Most Recent):  Temp: 98.7 °F (37.1 °C) (07/25/23 1820)  Pulse: 85 (07/25/23 1820)  Resp: 16 (07/25/23 1820)  BP: 118/64 (07/25/23 1820)  SpO2: 98 % (07/25/23 1820) Vital Signs (24h Range):  Temp:  [98.7 °F (37.1 °C)-99.2 °F (37.3 °C)] 98.7 °F (37.1 °C)  Pulse:  [85-91] 85  Resp:  [16-18] 16  SpO2:  [98 %-100 %] 98 %  BP: (118-128)/(62-64) 118/64     Weight: 83.9 kg (185 lb)  Body mass index is 31.76 kg/m².     Physical Exam  Vitals and nursing note reviewed.   Constitutional:       General: She is not in acute distress.     Appearance: She is obese.   HENT:      Head: Normocephalic and atraumatic.      Nose: Nose normal.      Mouth/Throat:      Pharynx: No oropharyngeal exudate.   Eyes:      Extraocular Movements: Extraocular movements intact.      Comments: Conjunctival pallor   Cardiovascular:      Rate and Rhythm: Normal rate and regular rhythm.   Pulmonary:      Effort: Pulmonary effort is normal. No respiratory distress.   Abdominal:      General: Bowel sounds are normal. There is no distension.      Palpations: Abdomen is soft.      Tenderness: There is no abdominal tenderness.   Musculoskeletal:         General: Normal range of motion.      Cervical back: Normal range of motion.      Comments: Mild BLE swelling   Skin:     General: Skin is warm and dry.      Capillary Refill: Capillary refill takes 2 to 3 seconds.   Neurological:      General: No focal deficit present.      Mental Status: She is alert and oriented to person, place, and time.   Psychiatric:         Mood and Affect: Mood normal.         Thought Content: Thought content normal.              Significant Labs: All pertinent labs within  the past 24 hours have been reviewed.  CBC:   Recent Labs   Lab 07/25/23  1822   WBC 8.94   HGB 6.1*   HCT 21.9*        CMP:   Recent Labs   Lab 07/25/23  1822      K 3.7      CO2 22*   *   BUN 11   CREATININE 0.8   CALCIUM 8.9   PROT 7.0   ALBUMIN 3.5   BILITOT 0.2   ALKPHOS 82   AST 17   ALT 13   ANIONGAP 11       Significant Imaging: I have reviewed all pertinent imaging results/findings within the past 24 hours.    Assessment/Plan:     * Symptomatic anemia  Iron deficiency anemia  Abnormal uterine bleeding  47F with history of ALO,  Uterine fibroids, past blood transfusions presenting with 2 weeks of heavy vaginal bleeding with associated lightheadedness and weakness today. Bleeding seems to have finally slowed down tonight.    -CBC with H/H 6.1/21.9. Baseline is low ~6.5-8. Pt reports she desires a hysterectomy eventually.   -1 unit PRBs ordered in ED  -Pelvic US ordered in ED  -Check ortho statics  -Daily CBC  -Continue ferrous sulfate  -Follow up with GYN at discharge    Hx of ischemic right ITZ stroke  -Stable, no new complaints.  -Pt reports taking aspirin. Was previous on Lipitor but ran out  -Check lipid panel (last was in 2021)  -Hold aspirin in AM since pt is not symptomatic with anemia, resume when appropriate   -Resume Lipitor     VTE Risk Mitigation (From admission, onward)         Ordered     IP VTE HIGH RISK PATIENT  Once         07/25/23 2137     Place sequential compression device  Until discontinued         07/25/23 2137     IP VTE HIGH RISK PATIENT  Once         07/25/23 2137     Place sequential compression device  Until discontinued         07/25/23 2137     Reason for No Pharmacological VTE Prophylaxis  Once        Question:  Reasons:  Answer:  Active Bleeding    07/25/23 2137                     On 07/25/2023, patient should be placed in hospital observation services under my care in collaboration with Evan Bran MD.      Margarita Ferreira PA-C  Department of  [facility] Medicine  [facility] - [facility] Dept

## 2023-07-26 NOTE — ASSESSMENT & PLAN NOTE
Iron deficiency anemia  Abnormal uterine bleeding  47F with history of ALO,  Uterine fibroids, past blood transfusions presenting with 2 weeks of heavy vaginal bleeding with associated lightheadedness and weakness today. Bleeding seems to have finally slowed down tonight.    -CBC with H/H 6.1/21.9. Baseline is low ~6.5-8. Pt reports she desires a hysterectomy eventually.   -1 unit PRBs ordered in ED  -Pelvic US ordered in ED  -Check ortho statics  -Daily CBC  -Continue ferrous sulfate  -Follow up with GYN at discharge

## 2023-07-26 NOTE — PLAN OF CARE
[facility]ation 11H  Discharge Assessment    Primary Care Provider: Megha Brar NP     Discharge Assessment (most recent)       BRIEF DISCHARGE ASSESSMENT - 23 1142          Discharge Planning    Assessment Type Discharge Planning Brief Assessment     Resource/Environmental Concerns none     Support Systems Family members     Equipment Currently Used at Home none     Current Living Arrangements home     Patient/Family Anticipates Transition to home     Patient/Family Anticipated Services at Transition none     DME Needed Upon Discharge  none     Discharge Plan A Home Health     Discharge Plan B Home                     Pt is a 47 y.o. female admitted with symptomatic anemia and has a PMH of ischemic R ITZ stroke, uterine fibroids. She lives with her children in a single story house, there is a ramp that was built for her now  mother. She has not required DME in the past and is independent with her ADLs and IADLs. She drove to the hospital and will be driving home. [facility] Discharge Packet given to patient and/or family with understanding verbalized.   name and number and estimated discharge date written on white board in patient's room with request to call for any questions or concerns.  Will continue to follow for needs.  Adam Martinez RN,BSN

## 2023-07-26 NOTE — ASSESSMENT & PLAN NOTE
-Stable, no new complaints.  -Pt reports taking aspirin. Was previous on Lipitor but ran out  -Check lipid panel (last was in 2021)  -Hold aspirin in AM since pt is not symptomatic with anemia, resume when appropriate   -Resume Lipitor

## 2023-07-26 NOTE — PLAN OF CARE
No distress upon arrival, AAOx4, ambulatory, denies any dizziness currently. Oriented to room, and call bell. Plan of care reviewed. Needs addressed. Call bell within reach.  Blood transfusion initiated after verification with another RN, continue to monitor.    Problem: Adult Inpatient Plan of Care  Goal: Plan of Care Review  Outcome: Ongoing, Progressing     Problem: Adult Inpatient Plan of Care  Goal: Optimal Comfort and Wellbeing  Outcome: Ongoing, Progressing     Problem: Infection  Goal: Absence of Infection Signs and Symptoms  Outcome: Ongoing, Progressing     Problem: Anemia  Goal: Anemia Symptom Improvement  Outcome: Ongoing, Progressing

## 2023-07-26 NOTE — ED PROVIDER NOTES
CC: Vaginal Bleeding (Period for 2 weeks, starting to feel weak, used 2 pads)      History provided by:   Patient     HPI: Patti Hernandez is a 47 y.o. year old female who presents to the ED for vaginal bleeding x 2 weeks, yesterday got a bit lighter but got heavier again today but then again got lighter in the afternoon    + lightheadedness today  No sob, + nausea no vomiting    Reports intermittent heavy bleeding, not on hormonal therapy    Has hx of CVA and she is on a baby ASA daily  Also on iron  No abd pain    Not sexually active      PHYSICAL EXAM:  Vitals:    07/25/23 1820   BP: 118/64   Pulse: 85   Resp: 16   Temp: 98.7 °F (37.1 °C)     VS: triage VS reviewed    general: comfortable, in no acute distress, pleasant, well nourished  HENT: neck symmetric, trachea midline  Eyes: no conjunctival injection and symmetrical eyelids  CV: RRR, no  murmurs, no rubs, no gallops, no LE edema  Resp: comfortable breathing, speaks in full sentences, CTAB, no wheezing, no crackles, no ronchi  ABD:  soft, ND, no masses, + normal BS, NT  Renal: No CVAT  Neuro: AAO x 3, face symmetric, speech normal  MSK: NC/AT, extremities w/out deformity   Skin: warm, dry  Pelvic ultrasound performed with chaperone in the room, no abnormal external genitalia, one medium size blood clot in the vaginal vault that was removed, no active bleeding from the cervix, no CMT    MDM:  Patti Hernandez is a 47 y.o. year old female who presents to the ED for vaginal bleeding.    Mildly tachycardic on arrival  Patient consented for blood transfusion after risks and benefits were discussed  Pelvic ultrasound ordered to rule out uterine abnormalities that would predispose to bleeding      Labs ordered and reviewed:   Cbc  w/ hg 6.1. plt wnl  Cmp wnl    Medication given in the ED: PRBC 1 U    Pelvic US (ordered and pending)      Old records obtained and reviewed:   Echo 2021: The left ventricle is normal in size with normal systolic function. The estimated  ejection fraction is 60%.  Normal left ventricular diastolic function.  Normal right ventricular size with normal right ventricular systolic function.  Moderate left atrial enlargement.  Mild mitral regurgitation.  Mild tricuspid regurgitation.  The estimated PA systolic pressure is 29 mmHg.  Normal central venous pressure (3 mmHg).     Case discussed with the consultant: JOSIE Bran    IMPRESSION:  1.) symptomatic anemia  2.) abnormal uterine bleeding    Dispo: Obs    Aggregate Critical Care Time by Attending (exclusive of procedural time) = 30 minutes         During the Emergency Depment visit, there was a high probability of imminent or life threatening deterioration in the patient's condition necessitating medical decision  making of a high complexity. Organ systems that were compromised/potentially compromised                     cardiovascular  Hematologic, severe anemia       Pt required constant monitoring because of the potential for them to deteriorate at any moment. Critical care was time spent personally by me on the following activities:  Development of treatment plan with patient or surrogate; discussion with consultant, evaluation of patient's response to treatment, examination of patient, obtaining history from patient or surrogate, ordering and performing treatments and interventions, ordering and reviewing of laboratory studies, ordering and review of radiographic studies, vitals, re-evaluation of patient' condition and review of old charts            The failure to initiate the interventions performed in the Emergency Department on an urgent basis would have likely resulted in sudden, clinically significant or life threatening deterioration in the patient's condition.                                 Past Medical History:   Diagnosis Date    Encounter for blood transfusion     4 per patient report (as of Feb 2021)    Hx of ischemic right ITZ stroke 7/1/2017    Iron deficiency anemia     Tobacco use  disorder 2021    Uterine fibroid      Past Surgical History:   Procedure Laterality Date     SECTION      x2    tubal ligation       Family History   Problem Relation Age of Onset    Anemia Mother     Stroke Mother     Throat cancer Father     Myasthenia gravis Child     Myasthenia gravis Child      No current facility-administered medications on file prior to encounter.     Current Outpatient Medications on File Prior to Encounter   Medication Sig Dispense Refill    acetaminophen (TYLENOL) 325 MG tablet Take 2 tablets (650 mg total) by mouth 4 (four) times daily. 60 tablet 0    aspirin 81 MG Chew Take 1 tablet (81 mg total) by mouth once daily. 30 tablet 11    atorvastatin (LIPITOR) 40 MG tablet Take 1 tablet (40 mg total) by mouth once daily. 90 tablet 3    ferrous sulfate 325 (65 FE) MG EC tablet Take 1 tablet (325 mg total) by mouth 2 (two) times daily. 60 tablet 0    ferrous sulfate 325 (65 FE) MG EC tablet Take 1 tablet (325 mg total) by mouth 2 (two) times daily. 90 tablet 5    ibuprofen (ADVIL,MOTRIN) 400 MG tablet Take 1 tablet (400 mg total) by mouth every 4 (four) hours as needed for Other (Pain). 60 tablet 0    omeprazole (PRILOSEC) 20 MG capsule Take 1 capsule (20 mg total) by mouth 2 (two) times a day. for 10 days 20 capsule 0    terconazole (TERAZOL 3) 0.8 % vaginal cream Place 1 applicator vaginally every evening. 20 g 1     Patient has no known allergies.  Social History     Socioeconomic History    Marital status: Single   Tobacco Use    Smoking status: Every Day     Packs/day: 1.00     Years: 20.00     Pack years: 20.00     Types: Cigarettes     Start date:     Smokeless tobacco: Never   Substance and Sexual Activity    Alcohol use: Not Currently     Alcohol/week: 1.0 standard drink     Types: 1 Shots of liquor per week     Comment: Quit in     Drug use: No    Sexual activity: Yes     Partners: Male                 DATA & INTERVENTIONS:    LABS reviewed:  Labs Reviewed   CBC  W/ AUTO DIFFERENTIAL - Abnormal; Notable for the following components:       Result Value    RBC 3.24 (*)     Hemoglobin 6.1 (*)     Hematocrit 21.9 (*)     MCV 68 (*)     MCH 18.8 (*)     MCHC 27.9 (*)     RDW 23.2 (*)     All other components within normal limits   COMPREHENSIVE METABOLIC PANEL - Abnormal; Notable for the following components:    CO2 22 (*)     Glucose 145 (*)     All other components within normal limits   URINALYSIS, REFLEX TO URINE CULTURE   POCT URINE PREGNANCY       RADIOLOGY reviewed:  Imaging Results    None         MEDICATIONS/FLUIDS:  Medications - No data to display           Araseli Lowe MD  07/26/23 1002       Araseli Lowe MD  08/08/23 5417

## 2023-07-26 NOTE — DISCHARGE SUMMARY
[facility] Observation [address] [place] [address] Medicine  Discharge Summary      Patient Name: Patti Hernandez  MRN: [id number]  BETINA: [id number]  Patient Class: OP- Observation  Admission Date: 2023  Hospital Length of Stay: 0 days  Discharge Date and Time: 2023  3:30 PM  Attending Physician: Kulwinder Krause MD  Discharging Provider: Armando Workman PA-C  Primary Care Provider: Megha Brar NP  Hospital Medicine Team: [facility] Y Armando Workman PA-C  Primary Care Team: [facility]    HPI:   Patti Hernandez is a 47 y.o. with PMHx ischemic right ITZ stroke (on aspirin), ALO, uterine fibroids, blood transfusions presenting to the ED for 2 weeks of heavy vaginal bleeding. She estimates she has gone through ~100 pads in the last two weeks. She endorses associated lightheadedness, nausea, and weakness today prompting her to present to the ED. She last saw her gynecologist for this in January and they had discussed hysterectomy, but she states she then skipped a period for about 3 months so she felt that she may not need to follow up just yet. Pelvic US was ordered that visit but not completed.  She denies any chest pain, SOB, headache, visual changes, syncope, or bleeding elsewhere. She takes iron at home as well as aspirin.    In the ED, AFVSS. Hbg 6.1 and hematocrit 21.9. Baseline hbg ~6.5-8.0. CBC and CMP otherwise unremarkable. UPT negative.  1 unit PRBCs and pelvic US ordered. Admitted to  for symptomatic anemia, vaginal bleeding.      * No surgery found *      Hospital Course:   She is in obs for menorrhagia. She is afebrile and HDS. Transvaginal/Abdominal US resulted in no sonographic abnormalities. Received 1 unit PRBC. RBC uptrending after treatment  HCT: 21.9 -->24.5. H.1 --> 7.1. Pt w/o signs of symptomatic anemia and medically ready for discharge home. Okay to continue aspirin for CVA. Pt instructed to make urgent follow up with outpatient OB/GYN. Pt educated on hospital course and plan,  verbally agrees and understands. All questions answered.         Goals of Care Treatment Preferences:  Code Status: Full Code      Consults:     Oncology  * Symptomatic anemia  Iron deficiency anemia  Abnormal uterine bleeding  47F with history of ALO,  Uterine fibroids, past blood transfusions presenting with 2 weeks of heavy vaginal bleeding with associated lightheadedness and weakness today. Bleeding seems to have finally slowed down tonight.    -CBC with H/H 6.1/21.9. Baseline is low ~6.5-8. Pt reports she desires a hysterectomy eventually.   -1 unit PRBs ordered in ED  -Pelvic US ordered in ED  -Check ortho statics  -Daily CBC  -Continue ferrous sulfate  -Follow up with GYN at discharge      Final Active Diagnoses:    Diagnosis Date Noted POA    PRINCIPAL PROBLEM:  Symptomatic anemia [D64.9] 06/30/2017 Yes    Iron deficiency anemia [D50.9] 02/12/2021 Yes    Hx of ischemic right ITZ stroke [Z86.73] 07/01/2017 Not Applicable    Abnormal uterine bleeding [N93.9] 06/30/2017 Yes      Problems Resolved During this Admission:       Discharged Condition: good    Disposition: Home or Self Care    Follow Up:   Follow-up Information     Megha Brar NP Follow up on 7/26/2023.    Specialty: Family Medicine  Why: F/U appointment     Left message on answering machine that pt. needs a f/u appointment they will call Pt.  Contact information:  [address] [address] [phone]                       Patient Instructions:      Notify your health care provider if you experience any of the following:  temperature >100.4     Notify your health care provider if you experience any of the following:  persistent dizziness, light-headedness, or visual disturbances     Notify your health care provider if you experience any of the following:  increased confusion or weakness     Activity as tolerated       Significant Diagnostic Studies: Labs: All labs within the past 24 hours have been reviewed    Pending  Diagnostic Studies:     None         Medications:  Reconciled Home Medications:      Medication List      CONTINUE taking these medications    acetaminophen 325 MG tablet  Commonly known as: TYLENOL  Take 2 tablets (650 mg total) by mouth 4 (four) times daily.     aspirin 81 MG Chew  Take 1 tablet (81 mg total) by mouth once daily.     atorvastatin 40 MG tablet  Commonly known as: LIPITOR  Take 1 tablet (40 mg total) by mouth once daily.     * ferrous sulfate 325 (65 FE) MG EC tablet  Take 1 tablet (325 mg total) by mouth 2 (two) times daily.     * ferrous sulfate 325 (65 FE) MG EC tablet  Take 1 tablet (325 mg total) by mouth 2 (two) times daily.     ibuprofen 400 MG tablet  Commonly known as: ADVIL,MOTRIN  Take 1 tablet (400 mg total) by mouth every 4 (four) hours as needed for Other (Pain).     omeprazole 20 MG capsule  Commonly known as: PRILOSEC  Take 1 capsule (20 mg total) by mouth 2 (two) times a day. for 10 days     terconazole 0.8 % vaginal cream  Commonly known as: TERAZOL 3  Place 1 applicator vaginally every evening.         * This list has 2 medication(s) that are the same as other medications prescribed for you. Read the directions carefully, and ask your doctor or other care provider to review them with you.                Indwelling Lines/Drains at time of discharge:   Lines/Drains/Airways     None                 Time spent on the discharge of patient: 36 minutes         Armando Workman PA-C  Department of Hospital Medicine  [facility]

## 2023-10-30 PROBLEM — Z86.73: Status: RESOLVED | Noted: 2017-07-01 | Resolved: 2023-10-30

## 2024-01-23 ENCOUNTER — OFFICE VISIT (OUTPATIENT)
Dept: OBSTETRICS AND GYNECOLOGY | Facility: CLINIC | Age: 49
End: 2024-01-23
Payer: MEDICAID

## 2024-01-23 ENCOUNTER — LAB VISIT (OUTPATIENT)
Dept: LAB | Facility: HOSPITAL | Age: 49
End: 2024-01-23
Attending: STUDENT IN AN ORGANIZED HEALTH CARE EDUCATION/TRAINING PROGRAM
Payer: MEDICAID

## 2024-01-23 VITALS — DIASTOLIC BLOOD PRESSURE: 79 MMHG | BODY MASS INDEX: 30.02 KG/M2 | SYSTOLIC BLOOD PRESSURE: 117 MMHG | WEIGHT: 175 LBS

## 2024-01-23 DIAGNOSIS — D64.9 ANEMIA, UNSPECIFIED TYPE: ICD-10-CM

## 2024-01-23 DIAGNOSIS — N93.9 ABNORMAL UTERINE BLEEDING: ICD-10-CM

## 2024-01-23 DIAGNOSIS — N92.0 MENORRHAGIA WITH REGULAR CYCLE: ICD-10-CM

## 2024-01-23 DIAGNOSIS — Z12.4 SCREENING FOR CERVICAL CANCER: ICD-10-CM

## 2024-01-23 DIAGNOSIS — N89.8 VAGINAL DISCHARGE: ICD-10-CM

## 2024-01-23 DIAGNOSIS — N92.0 MENORRHAGIA WITH REGULAR CYCLE: Primary | ICD-10-CM

## 2024-01-23 DIAGNOSIS — E03.8 SUBCLINICAL HYPOTHYROIDISM: ICD-10-CM

## 2024-01-23 DIAGNOSIS — Z12.31 ENCOUNTER FOR SCREENING MAMMOGRAM FOR MALIGNANT NEOPLASM OF BREAST: ICD-10-CM

## 2024-01-23 LAB
BASOPHILS # BLD AUTO: 0.03 K/UL (ref 0–0.2)
BASOPHILS NFR BLD: 0.4 % (ref 0–1.9)
DIFFERENTIAL METHOD BLD: ABNORMAL
EOSINOPHIL # BLD AUTO: 0.1 K/UL (ref 0–0.5)
EOSINOPHIL NFR BLD: 1.3 % (ref 0–8)
ERYTHROCYTE [DISTWIDTH] IN BLOOD BY AUTOMATED COUNT: 22.4 % (ref 11.5–14.5)
HCT VFR BLD AUTO: 23.2 % (ref 37–48.5)
HGB BLD-MCNC: 6.8 G/DL (ref 12–16)
IMM GRANULOCYTES # BLD AUTO: 0.03 K/UL (ref 0–0.04)
IMM GRANULOCYTES NFR BLD AUTO: 0.4 % (ref 0–0.5)
LYMPHOCYTES # BLD AUTO: 2.9 K/UL (ref 1–4.8)
LYMPHOCYTES NFR BLD: 39.1 % (ref 18–48)
MCH RBC QN AUTO: 18.6 PG (ref 27–31)
MCHC RBC AUTO-ENTMCNC: 29.3 G/DL (ref 32–36)
MCV RBC AUTO: 64 FL (ref 82–98)
MONOCYTES # BLD AUTO: 0.6 K/UL (ref 0.3–1)
MONOCYTES NFR BLD: 7.9 % (ref 4–15)
NEUTROPHILS # BLD AUTO: 3.8 K/UL (ref 1.8–7.7)
NEUTROPHILS NFR BLD: 50.9 % (ref 38–73)
NRBC BLD-RTO: 0 /100 WBC
PLATELET # BLD AUTO: 216 K/UL (ref 150–450)
PMV BLD AUTO: ABNORMAL FL (ref 9.2–12.9)
RBC # BLD AUTO: 3.65 M/UL (ref 4–5.4)
T4 FREE SERPL-MCNC: 0.82 NG/DL (ref 0.71–1.51)
TSH SERPL DL<=0.005 MIU/L-ACNC: 1.61 UIU/ML (ref 0.4–4)
WBC # BLD AUTO: 7.45 K/UL (ref 3.9–12.7)

## 2024-01-23 PROCEDURE — 99214 OFFICE O/P EST MOD 30 MIN: CPT | Mod: 57,S$PBB,, | Performed by: OBSTETRICS & GYNECOLOGY

## 2024-01-23 PROCEDURE — 3074F SYST BP LT 130 MM HG: CPT | Mod: CPTII,,, | Performed by: OBSTETRICS & GYNECOLOGY

## 2024-01-23 PROCEDURE — 84443 ASSAY THYROID STIM HORMONE: CPT | Performed by: STUDENT IN AN ORGANIZED HEALTH CARE EDUCATION/TRAINING PROGRAM

## 2024-01-23 PROCEDURE — 3078F DIAST BP <80 MM HG: CPT | Mod: CPTII,,, | Performed by: OBSTETRICS & GYNECOLOGY

## 2024-01-23 PROCEDURE — 84439 ASSAY OF FREE THYROXINE: CPT | Performed by: STUDENT IN AN ORGANIZED HEALTH CARE EDUCATION/TRAINING PROGRAM

## 2024-01-23 PROCEDURE — 3008F BODY MASS INDEX DOCD: CPT | Mod: CPTII,,, | Performed by: OBSTETRICS & GYNECOLOGY

## 2024-01-23 PROCEDURE — 88305 TISSUE EXAM BY PATHOLOGIST: CPT | Performed by: PATHOLOGY

## 2024-01-23 PROCEDURE — 99999 PR PBB SHADOW E&M-EST. PATIENT-LVL III: CPT | Mod: PBBFAC,,, | Performed by: OBSTETRICS & GYNECOLOGY

## 2024-01-23 PROCEDURE — 85025 COMPLETE CBC W/AUTO DIFF WBC: CPT | Performed by: STUDENT IN AN ORGANIZED HEALTH CARE EDUCATION/TRAINING PROGRAM

## 2024-01-23 PROCEDURE — 1160F RVW MEDS BY RX/DR IN RCRD: CPT | Mod: CPTII,,, | Performed by: OBSTETRICS & GYNECOLOGY

## 2024-01-23 PROCEDURE — 36415 COLL VENOUS BLD VENIPUNCTURE: CPT | Performed by: STUDENT IN AN ORGANIZED HEALTH CARE EDUCATION/TRAINING PROGRAM

## 2024-01-23 PROCEDURE — 88175 CYTOPATH C/V AUTO FLUID REDO: CPT | Performed by: STUDENT IN AN ORGANIZED HEALTH CARE EDUCATION/TRAINING PROGRAM

## 2024-01-23 PROCEDURE — 81514 NFCT DS BV&VAGINITIS DNA ALG: CPT | Performed by: STUDENT IN AN ORGANIZED HEALTH CARE EDUCATION/TRAINING PROGRAM

## 2024-01-23 PROCEDURE — 99213 OFFICE O/P EST LOW 20 MIN: CPT | Mod: PBBFAC,PO | Performed by: OBSTETRICS & GYNECOLOGY

## 2024-01-23 PROCEDURE — 1159F MED LIST DOCD IN RCRD: CPT | Mod: CPTII,,, | Performed by: OBSTETRICS & GYNECOLOGY

## 2024-01-23 PROCEDURE — 88305 TISSUE EXAM BY PATHOLOGIST: CPT | Mod: 26,,, | Performed by: PATHOLOGY

## 2024-01-23 PROCEDURE — 87491 CHLMYD TRACH DNA AMP PROBE: CPT | Performed by: STUDENT IN AN ORGANIZED HEALTH CARE EDUCATION/TRAINING PROGRAM

## 2024-01-23 NOTE — PROGRESS NOTES
CC: DEEPIKA    Patti Hernandez is a 48 y.o. female  presents with complaint of abnormal uterine bleeding. She reports heavy cycles and associated blood clots. She says her cycles last 5-7 days.    She has had 4 recent blood transfusions. She needs a refill of her iron supplements. She also reports vaginal discharge for the past 3 days.     Past Medical History:   Diagnosis Date    Encounter for blood transfusion     4 per patient report (as of 2021)    Hx of ischemic right ITZ stroke 2017    Iron deficiency anemia     Tobacco use disorder 2021    Uterine fibroid      Past Surgical History:   Procedure Laterality Date     SECTION      x2    tubal ligation       Social History     Socioeconomic History    Marital status: Single   Tobacco Use    Smoking status: Every Day     Current packs/day: 1.00     Average packs/day: 1 pack/day for 21.1 years (21.1 ttl pk-yrs)     Types: Cigarettes     Start date:     Smokeless tobacco: Never   Substance and Sexual Activity    Alcohol use: Not Currently     Alcohol/week: 1.0 standard drink of alcohol     Types: 1 Shots of liquor per week     Comment: Quit in     Drug use: No    Sexual activity: Yes     Partners: Male     Family History   Problem Relation Age of Onset    Anemia Mother     Stroke Mother     Throat cancer Father     Myasthenia gravis Child     Myasthenia gravis Child      OB History          6    Para   6    Term   6            AB        Living   6         SAB        IAB        Ectopic        Multiple        Live Births                     /79   Wt 79.4 kg (175 lb)   BMI 30.02 kg/m²     ROS:  Constitutional: no weight loss, weight gain, fever, fatigue  Eyes:  No vision changes, glasses/contacts  ENT/Mouth: No ulcers, sinus problems, ears ringing, headache  Cardiovascular: No inability to lie flat, chest pain, exercise intolerance, swelling, heart palpitations  Respiratory: No wheezing, coughing blood, shortness of  breath, or cough  Gastrointestinal: No diarrhea, bloody stool, nausea/vomiting, constipation, gas, hemorrhoids  Genitourinary: No blood in urine, painful urination, urgency of urination, frequency of urination, incomplete emptying, incontinence,  painful periods, , vaginal discharge, vaginal odor, painful intercourse, sexual problems, bleeding after intercourse.  Musculoskeletal: No muscle weakness  Skin/Breast: No painful breasts, nipple discharge, masses, rash, ulcers  Neurological: No passing out, seizures, numbness, headache  Endocrine: No diabetes, hypothyroid, hyperthyroid, hot flashes, hair loss, abnormal hair growth, ance  Psychiatric: No depression, crying  Hematologic: No bruises, bleeding, swollen lymph nodes, anemia.      OBJECTIVE:   The patient appears well, alert, oriented x 3, in no distress.  /79   Wt 79.4 kg (175 lb)   BMI 30.02 kg/m²   NECK: no thyromegaly, trachea midline  SKIN: no acne, striae, hirsutism  BREAST EXAM: breasts appear normal, no suspicious masses, no skin or nipple changes or axillary nodes  ABDOMEN: no hernias, masses, or hepatosplenomegaly  GENITALIA: normal external genitalia, no erythema, no discharge  URETHRA: normal appearing urethra with no masses, tenderness or lesions and normal urethra, normal urethral meatus  VAGINA: Normal  CERVIX: no lesions or cervical motion tenderness  UTERUS: large than normal  ADNEXA: normal adnexa      ENDOMETRIAL BIOPSY:    The patient was informed of the risk of bleeding, infection, uterine perforation, and pain.  She was counseled that the test will rule-out endometrial cancer with an accuracy greater than 96%.  She was counseled on the alternatives to endometrial biopsy and agrees to proceed.  A time out was performed.  Cx and vagina prepped with betadine  Anterior lip of the cervix was grasped with a single tooth tenaculum.  Pap was done.  A sterile endometrial pipelle was inserted into the uterine cavity.  The uterus sounds to  11cm.  A moderate of tissue was obtained.  The patient tolerated the procedure well.    All tissue was sent to pathology.  No results found for this or any previous visit from the past 365 days.    Lab Results   Component Value Date    WBC 6.35 07/26/2023    HGB 7.1 (L) 07/26/2023    HCT 24.5 (L) 07/26/2023    MCV 71 (L) 07/26/2023     07/26/2023    TSH 5.086 (H) 10/27/2021   vaginal bleeding;     TECHNIQUE:  Transabdominal sonography of the pelvis was performed, followed by transvaginal sonography to better evaluate the uterus and ovaries.     COMPARISON:  CT of the abdomen and pelvis from 07/15/2015.     FINDINGS:  Uterus:     Size: 12.7 x 5.7 x 7.5 cm     Masses: None     Endometrium: Normal in this pre menopausal patient, measuring up to 20 mm.     Right ovary:     Size: 3.4 x 1.5 x 2.9 cm     Appearance: Normal     Vascular flow: Normal.     Left ovary:     Size: 4.0 x 2.1 x 3.6 cm     Appearance: There is dominant follicle measuring up to 2.4 x 2.2 x 1.8 cm.     Vascular Flow: Normal.     Free Fluid:     None.     Impression:     No sonographic abnormality.        Electronically signed by: Robert Manrique  Date:                                            07/26/2023  Time:                                           01:08          ASSESSMENT AND PLAN:    1. Menorrhagia with regular cycle  TSH    US Pelvis Complete Non OB      2. Anemia, unspecified type  CBC Auto Differential      3. Vaginal discharge  C. trachomatis/N. gonorrhoeae by AMP DNA [facility]; Cervix    VAGINOSIS SCREEN BY DNA PROBE      4. Screening for cervical cancer  Liquid-Based Pap Smear, Screening      5. Encounter for screening mammogram for malignant neoplasm of breast  Liquid-Based Pap Smear, Screening           Patti was seen today for abnormal discharge.    Diagnoses and all orders for this visit:    Menorrhagia with regular cycle  -     TSH; Future  -     US Pelvis Complete Non OB; Future    Anemia, unspecified type  -     CBC Auto  Differential; Future    Vaginal discharge  -     C. trachomatis/N. gonorrhoeae by AMP DNA [facility]; Cervix  -     VAGINOSIS SCREEN BY DNA PROBE    Screening for cervical cancer  -     Liquid-Based Pap Smear, Screening    Encounter for screening mammogram for malignant neoplasm of breast  -     Liquid-Based Pap Smear, Screening     Unable to take hormones or lysteda for bleeding due to h/o CVA  Consents done for D&C hyst and ablation  Will f/u on embx results labs and updated u/s and med clearance  OR availability 2/15 and pt wants that date  Clearance scheduled karen

## 2024-01-24 ENCOUNTER — TELEPHONE (OUTPATIENT)
Dept: OBSTETRICS AND GYNECOLOGY | Facility: CLINIC | Age: 49
End: 2024-01-24
Payer: MEDICAID

## 2024-01-24 ENCOUNTER — OFFICE VISIT (OUTPATIENT)
Dept: FAMILY MEDICINE | Facility: HOSPITAL | Age: 49
End: 2024-01-24
Payer: MEDICAID

## 2024-01-24 VITALS
HEART RATE: 90 BPM | DIASTOLIC BLOOD PRESSURE: 76 MMHG | HEIGHT: 64 IN | WEIGHT: 174.63 LBS | OXYGEN SATURATION: 97 % | BODY MASS INDEX: 29.81 KG/M2 | SYSTOLIC BLOOD PRESSURE: 115 MMHG

## 2024-01-24 DIAGNOSIS — N93.9 ABNORMAL UTERINE BLEEDING (AUB): Primary | ICD-10-CM

## 2024-01-24 DIAGNOSIS — D50.0 IRON DEFICIENCY ANEMIA DUE TO CHRONIC BLOOD LOSS: ICD-10-CM

## 2024-01-24 PROCEDURE — 99213 OFFICE O/P EST LOW 20 MIN: CPT

## 2024-01-24 RX ORDER — EPINEPHRINE 0.3 MG/.3ML
0.3 INJECTION SUBCUTANEOUS ONCE AS NEEDED
Status: CANCELLED | OUTPATIENT
Start: 2024-01-24

## 2024-01-24 RX ORDER — SODIUM CHLORIDE 0.9 % (FLUSH) 0.9 %
10 SYRINGE (ML) INJECTION
Status: CANCELLED | OUTPATIENT
Start: 2024-01-24

## 2024-01-24 RX ORDER — DIPHENHYDRAMINE HYDROCHLORIDE 50 MG/ML
50 INJECTION INTRAMUSCULAR; INTRAVENOUS ONCE AS NEEDED
Status: CANCELLED | OUTPATIENT
Start: 2024-01-24

## 2024-01-24 RX ORDER — HEPARIN 100 UNIT/ML
500 SYRINGE INTRAVENOUS
Status: CANCELLED | OUTPATIENT
Start: 2024-01-24

## 2024-01-24 NOTE — PROGRESS NOTES
"[facility]  History & Physical    SUBJECTIVE:     Chief Complaint:   Chief Complaint   Patient presents with    Pre-op Exam       History of Present Illness:  48 y.o. female   who  has a past medical history of Encounter for blood transfusion, ischemic right ITZ stroke (2017), Iron deficiency anemia, Tobacco use disorder (2021), and Uterine fibroid, s/p tubal ligation, . presents to clinic today for pre-op eval.   Patient is under treatment by Ob-Gyn for AUB. (Was consented by Dr. Lopez for "D&C hyst and ablation" at her last visit to his office.)  Patient feels well today, LMP 3 weeks ago. States periods are regular and last approx 7 days with up to 40 pads per day on first 1-2 days of cycle. She has received transfusions and has been hospitalized in the past.   Iron supplementation ordered by Ob-Gyn.      Pre-operative evaluation with risk assessment              -           Scheduled for D&C hyst and ablation w/Dr. Lopez   -           DASI score: 58.2 w/ Functional Capacity in METS: 9.89 (>4)   - she  is not on antiplatelets/anticoagulation.   - she does not have a history of blood dyscrasias or previous reaction to anesthesia   - she is a CURRENT SMOKER - pack years 10 .  - she does not have a prior h/o HTN. BP: 115/76.   - she does have a prior h/o HLD/CAD w/ either MI or CVA. See below for latest ASCVD if all necessary values are available. Continue medical management w/ anti-coag held.  - she does not have a prior h/o DM. Currently taking n/a. See below for last A1C.  - she does not have a prior h/o thyroid disease. Currently taking n/a mcg levothyroxine. See below for last TSH.   - she does not have a prior h/o COPD/Asthma/KATTY/OHS. does not use CPAP. does not have changes from baseline function.   - she does have a prior h/o HF. Echo: EF 60%.  - BMI: Body mass index is 29.97 kg/m². 29.9              -           The patient requires higher Hgb to be medically " optimized with a low to moderate risk to proceed with (per ACC/AHA ulisses-operative guidelines) a moderate risk surgery. Current Hgb 6.8, asymptomatic. Patient counseled to seek immediate care, if symptomatic.   - Please call our office for any additional questions or concerns.    The ASCVD Risk score (Dyllan GUTIÉRREZ, et al., 2019) failed to calculate for the following reasons:    The patient has a prior MI or stroke diagnosis  Lab Results   Component Value Date    HGBA1C 5.6 10/27/2021      Lab Results   Component Value Date    TSH 1.607 01/23/2024            Allergies:  Review of patient's allergies indicates:  No Known Allergies    Home Medications:  Current Outpatient Medications on File Prior to Visit   Medication Sig    ferrous sulfate 325 (65 FE) MG EC tablet Take 1 tablet (325 mg total) by mouth 2 (two) times daily.    acetaminophen (TYLENOL) 325 MG tablet Take 2 tablets (650 mg total) by mouth 4 (four) times daily. (Patient not taking: Reported on 1/23/2024)    atorvastatin (LIPITOR) 40 MG tablet Take 1 tablet (40 mg total) by mouth once daily.    ferrous sulfate 325 (65 FE) MG EC tablet Take 1 tablet (325 mg total) by mouth 2 (two) times daily. (Patient not taking: Reported on 1/23/2024)    ibuprofen (ADVIL,MOTRIN) 400 MG tablet Take 1 tablet (400 mg total) by mouth every 4 (four) hours as needed for Other (Pain). (Patient not taking: Reported on 1/23/2024)    omeprazole (PRILOSEC) 20 MG capsule Take 1 capsule (20 mg total) by mouth 2 (two) times a day. for 10 days    terconazole (TERAZOL 3) 0.8 % vaginal cream Place 1 applicator vaginally every evening. (Patient not taking: Reported on 1/23/2024)    [DISCONTINUED] aspirin 81 MG Chew Take 1 tablet (81 mg total) by mouth once daily.     No current facility-administered medications on file prior to visit.       Past Medical History:   Diagnosis Date    Encounter for blood transfusion     4 per patient report (as of Feb 2021)    Hx of ischemic right ITZ stroke  2017    Iron deficiency anemia     Tobacco use disorder 2021    Uterine fibroid      Past Surgical History:   Procedure Laterality Date     SECTION      x2    tubal ligation       Family History   Problem Relation Age of Onset    Anemia Mother     Stroke Mother     Throat cancer Father     Myasthenia gravis Child     Myasthenia gravis Child      Social History     Tobacco Use    Smoking status: Every Day     Current packs/day: 1.00     Average packs/day: 1 pack/day for 21.1 years (21.1 ttl pk-yrs)     Types: Cigarettes     Start date:     Smokeless tobacco: Never   Substance Use Topics    Alcohol use: Not Currently     Alcohol/week: 1.0 standard drink of alcohol     Types: 1 Shots of liquor per week     Comment: Quit in     Drug use: No        Review of Systems   Constitutional:  Negative for chills and fever.   Respiratory:  Negative for cough and shortness of breath.    Cardiovascular:  Negative for chest pain and palpitations.   Gastrointestinal:  Negative for abdominal pain, blood in stool, heartburn, nausea and vomiting.   Genitourinary:  Negative for dysuria.   Musculoskeletal:  Negative for joint pain and myalgias.   Neurological:  Negative for dizziness and headaches.   Endo/Heme/Allergies:  Does not bruise/bleed easily.        OBJECTIVE:     Vital Signs:  Pulse: 90 (24 0958)  BP: 115/76 (24 0958)  SpO2: 97 % (24 0958)    Physical Exam  Constitutional:       Appearance: Normal appearance. She is obese.   HENT:      Head: Normocephalic and atraumatic.   Cardiovascular:      Rate and Rhythm: Normal rate and regular rhythm.      Pulses: Normal pulses.      Heart sounds: Normal heart sounds. No murmur heard.  Pulmonary:      Effort: Pulmonary effort is normal. No respiratory distress.      Breath sounds: Normal breath sounds. No wheezing, rhonchi or rales.   Abdominal:      General: Abdomen is flat. There is no distension.      Palpations: Abdomen is soft.       Tenderness: There is no abdominal tenderness. There is no guarding.   Musculoskeletal:      Right lower leg: No edema.      Left lower leg: No edema.   Neurological:      General: No focal deficit present.      Mental Status: She is alert and oriented to person, place, and time.   Psychiatric:         Mood and Affect: Mood normal.         Behavior: Behavior normal.         Thought Content: Thought content normal.         Judgment: Judgment normal.         A/P:  Patti was seen today for pre-op exam.    Diagnoses and all orders for this visit:    Abnormal uterine bleeding (AUB)  Iron deficiency anemia due to chronic blood loss  Patient complains of heavy periods for approx 2 yrs. Evaluation done by Ob-Gyn, recommend possible hysterectomy/ablation.   Patient is asymptomatic, but Hgb 6.8 on last check... iron infusions ordered by Ob-Gyn.  Surgery is scheduled for 2/15      DUE AT NEXT/FUTURE VISIT:  Patient has PCP, is welcome to establish with our office at any time    Braydon Gaviria  [facility], PGY-2  01/24/2024    This note was partially created using [employer] Voice Recognition software. Typographical and content errors may occur with this process. While efforts are made to detect and correct such errors, in some cases errors will persist. For this reason, wording in this document should be considered in the proper context and not strictly verbatim     The following information is provided to all patients.  This information is to help you find resources for any of the problems found today that may be affecting your health:                Living healthy guide: www.[facility].[place].gov       Understanding Diabetes: www.diabetes.org       Eating healthy: www.cdc.gov/healthyweight      CDC home safety checklist: www.cdc.gov/steadi/patient.html      Agency on Aging: www.goea.[place].[facility]       Alcoholics anonymous (AA): www.aa.org      Physical Activity: www.gurpreet.nih.gov/va0ytxx       Tobacco use:  www.quitwithusla.org

## 2024-01-24 NOTE — TELEPHONE ENCOUNTER
I put in an order for iv iron infusions x 4 at the infusion center at [facility] notify pt they will contact her to schedule these infusions bc she is extremely anemic

## 2024-01-25 DIAGNOSIS — N93.9 ABNORMAL UTERINE BLEEDING (AUB): Primary | ICD-10-CM

## 2024-01-25 LAB
BACTERIAL VAGINOSIS DNA: NEGATIVE
CANDIDA GLABRATA DNA: NEGATIVE
CANDIDA KRUSEI DNA: NEGATIVE
CANDIDA RRNA VAG QL PROBE: NEGATIVE
T VAGINALIS RRNA GENITAL QL PROBE: NEGATIVE

## 2024-01-26 ENCOUNTER — HOSPITAL ENCOUNTER (OUTPATIENT)
Dept: RADIOLOGY | Facility: HOSPITAL | Age: 49
Discharge: HOME | End: 2024-01-26
Attending: STUDENT IN AN ORGANIZED HEALTH CARE EDUCATION/TRAINING PROGRAM
Payer: MEDICAID

## 2024-01-26 DIAGNOSIS — N92.0 MENORRHAGIA WITH REGULAR CYCLE: ICD-10-CM

## 2024-01-26 PROCEDURE — 76856 US EXAM PELVIC COMPLETE: CPT | Mod: 26,,, | Performed by: RADIOLOGY

## 2024-01-26 PROCEDURE — 76830 TRANSVAGINAL US NON-OB: CPT | Mod: TC

## 2024-01-26 PROCEDURE — 76830 TRANSVAGINAL US NON-OB: CPT | Mod: 26,,, | Performed by: RADIOLOGY

## 2024-01-26 NOTE — PROGRESS NOTES
I assume primary medical responsibility for this patient. I have reviewed the history, physical, and assessement & treatment plan with the resident and agree that the care is reasonable and necessary. This service has been performed by a resident without the presence of a teaching physician under the primary care exception. If necessary, an addendum of additional findings or evaluation beyond the resident documentation will be noted below.    Patient is asymptomatic with chronic severe iron def anemia from menorrhagia awaiting ablation. Iron infusions ordered.  Patient should go directly to ER if bleeding persists and becomes symptomatic.

## 2024-01-27 LAB
FINAL PATHOLOGIC DIAGNOSIS: NORMAL
Lab: NORMAL

## 2024-01-29 ENCOUNTER — TELEPHONE (OUTPATIENT)
Dept: INFUSION THERAPY | Facility: HOSPITAL | Age: 49
End: 2024-01-29
Payer: MEDICAID

## 2024-01-29 LAB
FINAL PATHOLOGIC DIAGNOSIS: NORMAL
GROSS: NORMAL
Lab: NORMAL

## 2024-01-29 NOTE — TELEPHONE ENCOUNTER
Confirmed upcoming infusion appointments with the patient. Reviewed pre infusion instructions with the patient  2/19 at 1030a  2/26 at 1030a  3/4 at 1030a  3/11 at 1030a

## 2024-01-31 LAB
C TRACH DNA SPEC QL NAA+PROBE: NOT DETECTED
N GONORRHOEA DNA SPEC QL NAA+PROBE: NOT DETECTED

## 2024-02-15 ENCOUNTER — TELEPHONE (OUTPATIENT)
Dept: OBSTETRICS AND GYNECOLOGY | Facility: CLINIC | Age: 49
End: 2024-02-15
Payer: MEDICAID

## 2024-02-15 NOTE — TELEPHONE ENCOUNTER
----- Message from Mariama Galvin sent at 2/14/2024  4:18 PM CST -----  Type:  Needs Medical Advice    Who Called:  Pt    Would the patient rather a call back or a response via [employer]?  call  Best Call Back Number:   [phone]  Additional Information:  Pt is having a procedure tomorrow 2/15/24 and wants to verify the time.

## 2024-02-15 NOTE — TELEPHONE ENCOUNTER
Spoke with pt and informed her the anesthesia had canceled her surgery due to her hemoglobin being too low. She verbally unsderstood

## 2024-02-15 NOTE — TELEPHONE ENCOUNTER
----- Message from Mariama Galvin sent at 2/14/2024  4:18 PM CST -----  Type:  Needs Medical Advice    Who Called:  Pt    Would the patient rather a call back or a response via [employer]?  call  Best Call Back Number:   [phone]  Additional Information:  Pt is having a procedure tomorrow 2/15/24 and wants to verify the time.

## 2024-02-19 ENCOUNTER — INFUSION (OUTPATIENT)
Dept: INFUSION THERAPY | Facility: HOSPITAL | Age: 49
End: 2024-02-19
Attending: OBSTETRICS & GYNECOLOGY
Payer: MEDICAID

## 2024-02-19 VITALS
DIASTOLIC BLOOD PRESSURE: 83 MMHG | RESPIRATION RATE: 18 BRPM | TEMPERATURE: 98 F | OXYGEN SATURATION: 100 % | WEIGHT: 174.63 LBS | HEART RATE: 81 BPM | BODY MASS INDEX: 29.81 KG/M2 | SYSTOLIC BLOOD PRESSURE: 130 MMHG | HEIGHT: 64 IN

## 2024-02-19 DIAGNOSIS — N93.9 ABNORMAL UTERINE BLEEDING: Primary | ICD-10-CM

## 2024-02-19 DIAGNOSIS — D50.0 IRON DEFICIENCY ANEMIA DUE TO CHRONIC BLOOD LOSS: ICD-10-CM

## 2024-02-19 PROCEDURE — 25000003 PHARM REV CODE 250: Performed by: OBSTETRICS & GYNECOLOGY

## 2024-02-19 PROCEDURE — 63600175 PHARM REV CODE 636 W HCPCS: Performed by: OBSTETRICS & GYNECOLOGY

## 2024-02-19 PROCEDURE — 96374 THER/PROPH/DIAG INJ IV PUSH: CPT

## 2024-02-19 RX ORDER — DIPHENHYDRAMINE HYDROCHLORIDE 50 MG/ML
50 INJECTION INTRAMUSCULAR; INTRAVENOUS ONCE AS NEEDED
Status: CANCELLED | OUTPATIENT
Start: 2024-02-26

## 2024-02-19 RX ORDER — SODIUM CHLORIDE 0.9 % (FLUSH) 0.9 %
10 SYRINGE (ML) INJECTION
Status: DISCONTINUED | OUTPATIENT
Start: 2024-02-19 | End: 2024-02-19 | Stop reason: HOSPADM

## 2024-02-19 RX ORDER — EPINEPHRINE 0.3 MG/.3ML
0.3 INJECTION SUBCUTANEOUS ONCE AS NEEDED
Status: CANCELLED | OUTPATIENT
Start: 2024-02-26

## 2024-02-19 RX ORDER — DIPHENHYDRAMINE HYDROCHLORIDE 50 MG/ML
50 INJECTION INTRAMUSCULAR; INTRAVENOUS ONCE AS NEEDED
Status: DISCONTINUED | OUTPATIENT
Start: 2024-02-19 | End: 2024-02-19 | Stop reason: HOSPADM

## 2024-02-19 RX ORDER — EPINEPHRINE 0.3 MG/.3ML
0.3 INJECTION SUBCUTANEOUS ONCE AS NEEDED
Status: DISCONTINUED | OUTPATIENT
Start: 2024-02-19 | End: 2024-02-19 | Stop reason: HOSPADM

## 2024-02-19 RX ORDER — HEPARIN 100 UNIT/ML
500 SYRINGE INTRAVENOUS
Status: CANCELLED | OUTPATIENT
Start: 2024-02-26

## 2024-02-19 RX ORDER — SODIUM CHLORIDE 0.9 % (FLUSH) 0.9 %
10 SYRINGE (ML) INJECTION
Status: CANCELLED | OUTPATIENT
Start: 2024-02-26

## 2024-02-19 RX ADMIN — SODIUM CHLORIDE: 9 INJECTION, SOLUTION INTRAVENOUS at 10:02

## 2024-02-19 RX ADMIN — IRON SUCROSE 100 MG: 20 INJECTION, SOLUTION INTRAVENOUS at 10:02

## 2024-02-26 ENCOUNTER — INFUSION (OUTPATIENT)
Dept: INFUSION THERAPY | Facility: HOSPITAL | Age: 49
End: 2024-02-26
Attending: OBSTETRICS & GYNECOLOGY
Payer: MEDICAID

## 2024-02-26 VITALS
TEMPERATURE: 98 F | DIASTOLIC BLOOD PRESSURE: 64 MMHG | RESPIRATION RATE: 18 BRPM | OXYGEN SATURATION: 100 % | SYSTOLIC BLOOD PRESSURE: 119 MMHG | WEIGHT: 174.63 LBS | BODY MASS INDEX: 29.81 KG/M2 | HEART RATE: 93 BPM | HEIGHT: 64 IN

## 2024-02-26 DIAGNOSIS — N93.9 ABNORMAL UTERINE BLEEDING: Primary | ICD-10-CM

## 2024-02-26 DIAGNOSIS — D50.0 IRON DEFICIENCY ANEMIA DUE TO CHRONIC BLOOD LOSS: ICD-10-CM

## 2024-02-26 PROCEDURE — 63600175 PHARM REV CODE 636 W HCPCS: Performed by: OBSTETRICS & GYNECOLOGY

## 2024-02-26 PROCEDURE — 25000003 PHARM REV CODE 250: Performed by: OBSTETRICS & GYNECOLOGY

## 2024-02-26 PROCEDURE — 96374 THER/PROPH/DIAG INJ IV PUSH: CPT

## 2024-02-26 PROCEDURE — A4216 STERILE WATER/SALINE, 10 ML: HCPCS | Performed by: OBSTETRICS & GYNECOLOGY

## 2024-02-26 RX ORDER — SODIUM CHLORIDE 0.9 % (FLUSH) 0.9 %
10 SYRINGE (ML) INJECTION
Status: CANCELLED | OUTPATIENT
Start: 2024-03-04

## 2024-02-26 RX ORDER — HEPARIN 100 UNIT/ML
500 SYRINGE INTRAVENOUS
Status: CANCELLED | OUTPATIENT
Start: 2024-03-04

## 2024-02-26 RX ORDER — EPINEPHRINE 0.3 MG/.3ML
0.3 INJECTION SUBCUTANEOUS ONCE AS NEEDED
Status: CANCELLED | OUTPATIENT
Start: 2024-03-04

## 2024-02-26 RX ORDER — SODIUM CHLORIDE 0.9 % (FLUSH) 0.9 %
10 SYRINGE (ML) INJECTION
Status: DISCONTINUED | OUTPATIENT
Start: 2024-02-26 | End: 2024-02-26 | Stop reason: HOSPADM

## 2024-02-26 RX ORDER — DIPHENHYDRAMINE HYDROCHLORIDE 50 MG/ML
50 INJECTION INTRAMUSCULAR; INTRAVENOUS ONCE AS NEEDED
Status: CANCELLED | OUTPATIENT
Start: 2024-03-04

## 2024-02-26 RX ADMIN — Medication 10 ML: at 10:02

## 2024-02-26 RX ADMIN — SODIUM CHLORIDE: 9 INJECTION, SOLUTION INTRAVENOUS at 10:02

## 2024-02-26 RX ADMIN — IRON SUCROSE 100 MG: 20 INJECTION, SOLUTION INTRAVENOUS at 10:02

## 2024-02-26 NOTE — NURSING
Pt tolerated venofer 100mg 2/4 iv push well.  No adverse reaction noted.   IV flushed with NS and D/C per protocol.  Patient left clinic in no acute distress.

## 2024-03-04 ENCOUNTER — INFUSION (OUTPATIENT)
Dept: INFUSION THERAPY | Facility: HOSPITAL | Age: 49
End: 2024-03-04
Attending: OBSTETRICS & GYNECOLOGY
Payer: MEDICAID

## 2024-03-04 VITALS
DIASTOLIC BLOOD PRESSURE: 79 MMHG | SYSTOLIC BLOOD PRESSURE: 132 MMHG | RESPIRATION RATE: 18 BRPM | HEIGHT: 64 IN | WEIGHT: 174.63 LBS | OXYGEN SATURATION: 98 % | HEART RATE: 95 BPM | TEMPERATURE: 99 F | BODY MASS INDEX: 29.81 KG/M2

## 2024-03-04 DIAGNOSIS — D50.0 IRON DEFICIENCY ANEMIA DUE TO CHRONIC BLOOD LOSS: ICD-10-CM

## 2024-03-04 DIAGNOSIS — N93.9 ABNORMAL UTERINE BLEEDING: Primary | ICD-10-CM

## 2024-03-04 PROCEDURE — A4216 STERILE WATER/SALINE, 10 ML: HCPCS | Performed by: OBSTETRICS & GYNECOLOGY

## 2024-03-04 PROCEDURE — 25000003 PHARM REV CODE 250: Performed by: OBSTETRICS & GYNECOLOGY

## 2024-03-04 PROCEDURE — 96374 THER/PROPH/DIAG INJ IV PUSH: CPT

## 2024-03-04 PROCEDURE — 63600175 PHARM REV CODE 636 W HCPCS: Performed by: OBSTETRICS & GYNECOLOGY

## 2024-03-04 RX ORDER — SODIUM CHLORIDE 0.9 % (FLUSH) 0.9 %
10 SYRINGE (ML) INJECTION
Status: DISCONTINUED | OUTPATIENT
Start: 2024-03-04 | End: 2024-03-04 | Stop reason: HOSPADM

## 2024-03-04 RX ORDER — HEPARIN 100 UNIT/ML
500 SYRINGE INTRAVENOUS
Status: CANCELLED | OUTPATIENT
Start: 2024-03-11

## 2024-03-04 RX ORDER — EPINEPHRINE 0.3 MG/.3ML
0.3 INJECTION SUBCUTANEOUS ONCE AS NEEDED
Status: CANCELLED | OUTPATIENT
Start: 2024-03-11

## 2024-03-04 RX ORDER — SODIUM CHLORIDE 0.9 % (FLUSH) 0.9 %
10 SYRINGE (ML) INJECTION
Status: CANCELLED | OUTPATIENT
Start: 2024-03-11

## 2024-03-04 RX ORDER — DIPHENHYDRAMINE HYDROCHLORIDE 50 MG/ML
50 INJECTION INTRAMUSCULAR; INTRAVENOUS ONCE AS NEEDED
Status: CANCELLED | OUTPATIENT
Start: 2024-03-11

## 2024-03-04 RX ADMIN — SODIUM CHLORIDE: 9 INJECTION, SOLUTION INTRAVENOUS at 10:03

## 2024-03-04 RX ADMIN — IRON SUCROSE 100 MG: 20 INJECTION, SOLUTION INTRAVENOUS at 10:03

## 2024-03-04 RX ADMIN — Medication 10 ML: at 10:03

## 2024-03-04 NOTE — PLAN OF CARE
Pt tolerated venofer 100mg iv push over 5 min dose 3 of 4 well.  No adverse reaction noted. No complaints at this time. PIV flushed with NS and D/C per protocol. Patient left clinic in no acute distress.

## 2024-03-11 ENCOUNTER — INFUSION (OUTPATIENT)
Dept: INFUSION THERAPY | Facility: HOSPITAL | Age: 49
End: 2024-03-11
Attending: OBSTETRICS & GYNECOLOGY
Payer: MEDICAID

## 2024-03-11 VITALS
BODY MASS INDEX: 29.81 KG/M2 | OXYGEN SATURATION: 97 % | HEART RATE: 68 BPM | SYSTOLIC BLOOD PRESSURE: 129 MMHG | WEIGHT: 174.63 LBS | RESPIRATION RATE: 18 BRPM | TEMPERATURE: 99 F | DIASTOLIC BLOOD PRESSURE: 77 MMHG | HEIGHT: 64 IN

## 2024-03-11 DIAGNOSIS — N93.9 ABNORMAL UTERINE BLEEDING: Primary | ICD-10-CM

## 2024-03-11 DIAGNOSIS — D50.0 IRON DEFICIENCY ANEMIA DUE TO CHRONIC BLOOD LOSS: ICD-10-CM

## 2024-03-11 PROCEDURE — A4216 STERILE WATER/SALINE, 10 ML: HCPCS | Performed by: OBSTETRICS & GYNECOLOGY

## 2024-03-11 PROCEDURE — 25000003 PHARM REV CODE 250: Performed by: OBSTETRICS & GYNECOLOGY

## 2024-03-11 PROCEDURE — 96374 THER/PROPH/DIAG INJ IV PUSH: CPT

## 2024-03-11 PROCEDURE — 63600175 PHARM REV CODE 636 W HCPCS: Performed by: OBSTETRICS & GYNECOLOGY

## 2024-03-11 RX ORDER — HEPARIN 100 UNIT/ML
500 SYRINGE INTRAVENOUS
OUTPATIENT
Start: 2024-03-18

## 2024-03-11 RX ORDER — EPINEPHRINE 0.3 MG/.3ML
0.3 INJECTION SUBCUTANEOUS ONCE AS NEEDED
OUTPATIENT
Start: 2024-03-18

## 2024-03-11 RX ORDER — SODIUM CHLORIDE 0.9 % (FLUSH) 0.9 %
10 SYRINGE (ML) INJECTION
Status: DISCONTINUED | OUTPATIENT
Start: 2024-03-11 | End: 2024-03-11 | Stop reason: HOSPADM

## 2024-03-11 RX ORDER — DIPHENHYDRAMINE HYDROCHLORIDE 50 MG/ML
50 INJECTION INTRAMUSCULAR; INTRAVENOUS ONCE AS NEEDED
OUTPATIENT
Start: 2024-03-18

## 2024-03-11 RX ORDER — SODIUM CHLORIDE 0.9 % (FLUSH) 0.9 %
10 SYRINGE (ML) INJECTION
OUTPATIENT
Start: 2024-03-18

## 2024-03-11 RX ADMIN — SODIUM CHLORIDE: 9 INJECTION, SOLUTION INTRAVENOUS at 10:03

## 2024-03-11 RX ADMIN — IRON SUCROSE 100 MG: 20 INJECTION, SOLUTION INTRAVENOUS at 10:03

## 2024-03-11 RX ADMIN — Medication 10 ML: at 11:03

## 2024-03-11 NOTE — PLAN OF CARE
Pt tolerated Venofer 4/4 today. PIV flushed, + blood return, saline locked, and removed prior to discharge. VSS. AVS provided. Discharged off unit without complaints.

## 2024-09-30 NOTE — PLAN OF CARE
Problem: Patient Care Overview  Goal: Plan of Care Review  Outcome: Ongoing (interventions implemented as appropriate)  Plan of care reviewed with pt and understanding verbalized. Fall reduction measures in place, bed in lowest position, wheels locked, upper side rails raised. Pt denies pain or discomfort.         
Problem: Patient Care Overview  Goal: Plan of Care Review  Outcome: Ongoing (interventions implemented as appropriate)  Rounding every 2 hours and call light within reach to reduce risk of falls. Administer pain medications per MD order to control pain level.      
Closed loop small bowel obstruction

## 2024-12-15 ENCOUNTER — HOSPITAL ENCOUNTER (EMERGENCY)
Facility: HOSPITAL | Age: 49
Discharge: HOME | End: 2024-12-15
Attending: EMERGENCY MEDICINE

## 2024-12-15 VITALS
RESPIRATION RATE: 20 BRPM | DIASTOLIC BLOOD PRESSURE: 88 MMHG | SYSTOLIC BLOOD PRESSURE: 140 MMHG | WEIGHT: 175 LBS | TEMPERATURE: 99 F | HEIGHT: 64 IN | BODY MASS INDEX: 29.88 KG/M2 | OXYGEN SATURATION: 99 % | HEART RATE: 66 BPM

## 2024-12-15 DIAGNOSIS — R53.1 GENERALIZED WEAKNESS: ICD-10-CM

## 2024-12-15 DIAGNOSIS — R03.0 ELEVATED BLOOD PRESSURE READING: ICD-10-CM

## 2024-12-15 DIAGNOSIS — R42 LIGHTHEADEDNESS: Primary | ICD-10-CM

## 2024-12-15 LAB
ABO + RH BLD: NORMAL
ALBUMIN SERPL BCP-MCNC: 3.2 G/DL (ref 3.5–5.2)
ALP SERPL-CCNC: 74 U/L (ref 40–150)
ALT SERPL W/O P-5'-P-CCNC: 14 U/L (ref 10–44)
ANION GAP SERPL CALC-SCNC: 6 MMOL/L (ref 8–16)
AST SERPL-CCNC: 13 U/L (ref 10–40)
B-HCG UR QL: NEGATIVE
BASOPHILS # BLD AUTO: 0.03 K/UL (ref 0–0.2)
BASOPHILS NFR BLD: 0.5 % (ref 0–1.9)
BILIRUB SERPL-MCNC: 0.3 MG/DL (ref 0.1–1)
BILIRUB UR QL STRIP: NEGATIVE
BLD GP AB SCN CELLS X3 SERPL QL: NORMAL
BNP SERPL-MCNC: <10 PG/ML (ref 0–99)
BUN SERPL-MCNC: 11 MG/DL (ref 6–20)
BUN SERPL-MCNC: 11 MG/DL (ref 6–30)
CALCIUM SERPL-MCNC: 7.8 MG/DL (ref 8.7–10.5)
CHLORIDE SERPL-SCNC: 105 MMOL/L (ref 95–110)
CHLORIDE SERPL-SCNC: 115 MMOL/L (ref 95–110)
CLARITY UR REFRACT.AUTO: ABNORMAL
CO2 SERPL-SCNC: 21 MMOL/L (ref 23–29)
COLOR UR AUTO: YELLOW
CREAT SERPL-MCNC: 0.7 MG/DL (ref 0.5–1.4)
CREAT SERPL-MCNC: 0.9 MG/DL (ref 0.5–1.4)
CTP QC/QA: YES
DIFFERENTIAL METHOD BLD: ABNORMAL
EOSINOPHIL # BLD AUTO: 0.1 K/UL (ref 0–0.5)
EOSINOPHIL NFR BLD: 1 % (ref 0–8)
ERYTHROCYTE [DISTWIDTH] IN BLOOD BY AUTOMATED COUNT: 21.4 % (ref 11.5–14.5)
EST. GFR  (NO RACE VARIABLE): >60 ML/MIN/1.73 M^2
GLUCOSE SERPL-MCNC: 88 MG/DL (ref 70–110)
GLUCOSE SERPL-MCNC: 91 MG/DL (ref 70–110)
GLUCOSE UR QL STRIP: NEGATIVE
HCT VFR BLD AUTO: 35.5 % (ref 37–48.5)
HCT VFR BLD CALC: 35 %PCV (ref 36–54)
HCV AB SERPL QL IA: NORMAL
HGB BLD-MCNC: 10.6 G/DL (ref 12–16)
HGB UR QL STRIP: NEGATIVE
HIV 1+2 AB+HIV1 P24 AG SERPL QL IA: NORMAL
IMM GRANULOCYTES # BLD AUTO: 0.01 K/UL (ref 0–0.04)
IMM GRANULOCYTES NFR BLD AUTO: 0.2 % (ref 0–0.5)
KETONES UR QL STRIP: NEGATIVE
LEUKOCYTE ESTERASE UR QL STRIP: NEGATIVE
LYMPHOCYTES # BLD AUTO: 2.2 K/UL (ref 1–4.8)
LYMPHOCYTES NFR BLD: 36.4 % (ref 18–48)
MCH RBC QN AUTO: 23.1 PG (ref 27–31)
MCHC RBC AUTO-ENTMCNC: 29.9 G/DL (ref 32–36)
MCV RBC AUTO: 77 FL (ref 82–98)
MONOCYTES # BLD AUTO: 0.4 K/UL (ref 0.3–1)
MONOCYTES NFR BLD: 6.5 % (ref 4–15)
NEUTROPHILS # BLD AUTO: 3.3 K/UL (ref 1.8–7.7)
NEUTROPHILS NFR BLD: 55.4 % (ref 38–73)
NITRITE UR QL STRIP: NEGATIVE
NRBC BLD-RTO: 0 /100 WBC
PH UR STRIP: 7 [PH] (ref 5–8)
PLATELET # BLD AUTO: 183 K/UL (ref 150–450)
PMV BLD AUTO: ABNORMAL FL (ref 9.2–12.9)
POC IONIZED CALCIUM: 1.21 MMOL/L (ref 1.06–1.42)
POC TCO2 (MEASURED): 25 MMOL/L (ref 23–29)
POTASSIUM BLD-SCNC: 3.6 MMOL/L (ref 3.5–5.1)
POTASSIUM SERPL-SCNC: 3.3 MMOL/L (ref 3.5–5.1)
PROT SERPL-MCNC: 6.1 G/DL (ref 6–8.4)
PROT UR QL STRIP: NEGATIVE
RBC # BLD AUTO: 4.59 M/UL (ref 4–5.4)
SAMPLE: ABNORMAL
SODIUM BLD-SCNC: 144 MMOL/L (ref 136–145)
SODIUM SERPL-SCNC: 142 MMOL/L (ref 136–145)
SP GR UR STRIP: 1.01 (ref 1–1.03)
SPECIMEN OUTDATE: NORMAL
TROPONIN I SERPL DL<=0.01 NG/ML-MCNC: <3 NG/L (ref 0–14)
URN SPEC COLLECT METH UR: ABNORMAL
WBC # BLD AUTO: 6.01 K/UL (ref 3.9–12.7)

## 2024-12-15 PROCEDURE — 93010 ELECTROCARDIOGRAM REPORT: CPT | Mod: ,,, | Performed by: INTERNAL MEDICINE

## 2024-12-15 PROCEDURE — 80053 COMPREHEN METABOLIC PANEL: CPT | Performed by: EMERGENCY MEDICINE

## 2024-12-15 PROCEDURE — 99285 EMERGENCY DEPT VISIT HI MDM: CPT | Mod: 25

## 2024-12-15 PROCEDURE — 85025 COMPLETE CBC W/AUTO DIFF WBC: CPT | Performed by: EMERGENCY MEDICINE

## 2024-12-15 PROCEDURE — 81003 URINALYSIS AUTO W/O SCOPE: CPT | Performed by: PHYSICIAN ASSISTANT

## 2024-12-15 PROCEDURE — 86803 HEPATITIS C AB TEST: CPT | Performed by: PHYSICIAN ASSISTANT

## 2024-12-15 PROCEDURE — 83880 ASSAY OF NATRIURETIC PEPTIDE: CPT | Performed by: PHYSICIAN ASSISTANT

## 2024-12-15 PROCEDURE — 84484 ASSAY OF TROPONIN QUANT: CPT | Performed by: PHYSICIAN ASSISTANT

## 2024-12-15 PROCEDURE — 81025 URINE PREGNANCY TEST: CPT | Performed by: PHYSICIAN ASSISTANT

## 2024-12-15 PROCEDURE — 93005 ELECTROCARDIOGRAM TRACING: CPT

## 2024-12-15 PROCEDURE — 86850 RBC ANTIBODY SCREEN: CPT | Performed by: EMERGENCY MEDICINE

## 2024-12-15 PROCEDURE — 96360 HYDRATION IV INFUSION INIT: CPT

## 2024-12-15 PROCEDURE — 87389 HIV-1 AG W/HIV-1&-2 AB AG IA: CPT | Performed by: PHYSICIAN ASSISTANT

## 2024-12-15 PROCEDURE — 25000003 PHARM REV CODE 250: Performed by: PHYSICIAN ASSISTANT

## 2024-12-15 PROCEDURE — 80047 BASIC METABLC PNL IONIZED CA: CPT

## 2024-12-15 RX ADMIN — SODIUM CHLORIDE 1000 ML: 9 INJECTION, SOLUTION INTRAVENOUS at 12:12

## 2024-12-15 NOTE — DISCHARGE INSTRUCTIONS
Your workup today is reassuring. Laboratory workup shows that you are anemic though improving. Your blood pressure was elevated today. Please follow up with primary care for further workup and treatment.    Strict ED precautions given to return immediately for new, worsening, or concerning symptoms

## 2024-12-15 NOTE — ED PROVIDER NOTES
Encounter Date: 12/15/2024       History     Chief Complaint   Patient presents with    Weakness     Past few days nausea, weakness. Hx anemia with hx of transfusions     49 y.o. Female with a PMHx of CVA, ALO, multiple blood transfusions, uterine fibroids presents to the ED with generalized weakness x1 week.  She has associated lightheadedness. She has a history of anemia in the past secondary to heavy vaginal bleeding and uterine fibroids.  She denies vaginal bleeding today.  She denies headaches, visual changes, paresthesias, syncope, chest pain, shortness of breath, palpitations, cough, congestion, abdominal pain, nausea, dysuria.    The history is provided by the patient.     Review of patient's allergies indicates:  No Known Allergies  Past Medical History:   Diagnosis Date    Encounter for blood transfusion     4 per patient report (as of 2021)    Hx of ischemic right ITZ stroke 2017    Iron deficiency anemia     Tobacco use disorder 2021    Uterine fibroid      Past Surgical History:   Procedure Laterality Date     SECTION      x2    tubal ligation       Family History   Problem Relation Name Age of Onset    Anemia Mother      Stroke Mother      Throat cancer Father      Myasthenia gravis Child Daughter     Myasthenia gravis Child Daughter 2      Social History     Tobacco Use    Smoking status: Every Day     Current packs/day: 1.00     Average packs/day: 1 pack/day for 22.0 years (22.0 ttl pk-yrs)     Types: Cigarettes     Start date:     Smokeless tobacco: Never   Substance Use Topics    Alcohol use: Not Currently     Alcohol/week: 1.0 standard drink of alcohol     Types: 1 Shots of liquor per week     Comment: Quit in     Drug use: No     Review of Systems   Constitutional:  Negative for appetite change and fever.   HENT:  Negative for congestion.    Eyes:  Negative for visual disturbance.   Respiratory:  Negative for shortness of breath.    Cardiovascular:  Negative for chest  pain and palpitations.   Gastrointestinal:  Positive for nausea. Negative for abdominal pain and vomiting.   Genitourinary:  Negative for dysuria.   Neurological:  Positive for weakness and light-headedness. Negative for headaches.       Physical Exam     Initial Vitals [12/15/24 1024]   BP Pulse Resp Temp SpO2   (!) 151/88 84 18 98.6 °F (37 °C) 100 %      MAP       --         Physical Exam    Nursing note and vitals reviewed.  Constitutional: She appears well-developed and well-nourished. She is not diaphoretic. No distress.   HENT:   Head: Normocephalic and atraumatic.   Nose: Nose normal.   Eyes: Conjunctivae and EOM are normal.   Neck: Neck supple.   Cardiovascular:  Normal rate, regular rhythm, normal heart sounds and intact distal pulses.           Pulmonary/Chest: Breath sounds normal. No respiratory distress.   Abdominal: Abdomen is soft. Bowel sounds are normal. She exhibits no distension. There is no abdominal tenderness. There is no guarding.   Musculoskeletal:      Cervical back: Neck supple.     Neurological: She is alert and oriented to person, place, and time. Gait normal. GCS eye subscore is 4. GCS verbal subscore is 5. GCS motor subscore is 6.   Skin: No rash noted.   Psychiatric: She has a normal mood and affect. Her behavior is normal. Judgment and thought content normal.         ED Course   Procedures  Labs Reviewed   CBC W/ AUTO DIFFERENTIAL - Abnormal       Result Value    WBC 6.01      RBC 4.59      Hemoglobin 10.6 (*)     Hematocrit 35.5 (*)     MCV 77 (*)     MCH 23.1 (*)     MCHC 29.9 (*)     RDW 21.4 (*)     Platelets 183      MPV SEE COMMENT      Immature Granulocytes 0.2      Gran # (ANC) 3.3      Immature Grans (Abs) 0.01      Lymph # 2.2      Mono # 0.4      Eos # 0.1      Baso # 0.03      nRBC 0      Gran % 55.4      Lymph % 36.4      Mono % 6.5      Eosinophil % 1.0      Basophil % 0.5      Differential Method Automated      Narrative:     Release to patient->Immediate   URINALYSIS,  REFLEX TO URINE CULTURE - Abnormal    Specimen UA Urine, Clean Catch      Color, UA Yellow      Appearance, UA Hazy (*)     pH, UA 7.0      Specific Gravity, UA 1.010      Protein, UA Negative      Glucose, UA Negative      Ketones, UA Negative      Bilirubin (UA) Negative      Occult Blood UA Negative      Nitrite, UA Negative      Leukocytes, UA Negative      Narrative:     Specimen Source->Urine   COMPREHENSIVE METABOLIC PANEL - Abnormal    Sodium 142      Potassium 3.3 (*)     Chloride 115 (*)     CO2 21 (*)     Glucose 88      BUN 11      Creatinine 0.7      Calcium 7.8 (*)     Total Protein 6.1      Albumin 3.2 (*)     Total Bilirubin 0.3      Alkaline Phosphatase 74      AST 13      ALT 14      eGFR >60.0      Anion Gap 6 (*)    ISTAT PROCEDURE - Abnormal    POC Glucose 91      POC BUN 11      POC Creatinine 0.9      POC Sodium 144      POC Potassium 3.6      POC Chloride 105      POC TCO2 (MEASURED) 25      POC Ionized Calcium 1.21      POC Hematocrit 35 (*)     Sample MELLISSA     HEPATITIS C ANTIBODY    Hepatitis C Ab Non-reactive      Narrative:     Release to patient->Immediate   HIV 1 / 2 ANTIBODY    HIV 1/2 Ag/Ab Non-reactive      Narrative:     Release to patient->Immediate   TROPONIN I HIGH SENSITIVITY    Troponin I High Sensitivity <3     B-TYPE NATRIURETIC PEPTIDE    BNP <10     POCT URINE PREGNANCY    POC Preg Test, Ur Negative       Acceptable Yes     TYPE & SCREEN    Group & Rh O POS      Indirect Darlene NEG      Specimen Outdate 12/18/2024 23:59       EKG Readings: (Independently Interpreted)   Initial Reading: No STEMI. Previous EKG Date: 7/25/23. Rhythm: Normal Sinus Rhythm. Heart Rate: 63. Clinical Impression: Normal Sinus Rhythm   Nonspecific T-wave abnormality       Imaging Results              X-Ray Chest AP Portable (Final result)  Result time 12/15/24 12:32:57      Final result by Wayne Rodriguez MD (12/15/24 12:32:57)                   Impression:      No convincing  evidence of acute cardiopulmonary disease.      Electronically signed by: Wayne Rodriguez  Date:    12/15/2024  Time:    12:32               Narrative:    EXAMINATION:  XR CHEST AP PORTABLE    CLINICAL HISTORY:  Weakness    TECHNIQUE:  Single frontal view of the chest was performed.    COMPARISON:  None    FINDINGS:  Monitoring leads overlie the chest.    Cardiac contours appear to be within normal limits.  Lungs essentially clear.  No definite pneumothorax or large volume pleural effusion.    No acute findings in the visualized abdomen.    Osseous and soft tissue structures appear without definite acute change.                                       Medications   sodium chloride 0.9% bolus 1,000 mL 1,000 mL (0 mLs Intravenous Stopped 12/15/24 1413)     Medical Decision Making  49 y.o. Female with a PMHx of CVA, ALO, multiple blood transfusions, uterine fibroids presents to the ED with generalized weakness x1 week.   Nontoxic appearing. Hemodynamically stable. Afebrile. Exam as above. I will initiate workup and reassess.    Ddx:  Symptomatic anemia, arrhythmia, ACS UTI, electrolyte derangement, SIOMARA    Workup today is overall reassuring.  Anemia noted though H&H is stable.  Improved from prior laboratory studies. Urine negative for signs of infection including nitrites, leukocytes, blood.  Chemistry with multiple electrolyte derangements.  Chemistry results were delayed suspicious for hemolyzed sample. On repeat i-STAT normal electrolytes were noted.  EKG with normal sinus rhythm.  No new ST segmental changes concerning for acute ACS. Troponin and BNP within normal limits. On reassessment, she is resting comfortably. She states that she feels better after fluids. At this time she is stable for discharge.  Follow up given with primary care for elevated blood pressure readings while in the ED. Strict ED precautions given to return immediately for new, worsening, or concerning symptoms    Amount and/or Complexity of Data  Reviewed  Labs: ordered. Decision-making details documented in ED Course.  Radiology: ordered.               ED Course as of 12/15/24 1754   Sun Dec 15, 2024   1224 WBC: 6.01 [HM]   1224 Hemoglobin(!): 10.6 [HM]   1224 Hematocrit(!): 35.5 [HM]   1224 Blood, UA: Negative [HM]   1224 NITRITE UA: Negative [HM]   1224 Leukocyte Esterase, UA: Negative [HM]   1224 hCG Qualitative, Urine: Negative [HM]   1251 Troponin I High Sensitivity: <3 [HM]   1251 BNP: <10 [HM]   1417 POC Ionized Calcium: 1.21 [HM]   1418 POC Creatinine: 0.9 [HM]   1418 POC Chloride: 105 [HM]   1418 POC Potassium: 3.6 [HM]      ED Course User Index  [HM] Margarita Vines PA-C                           Clinical Impression:  Final diagnoses:  [R53.1] Generalized weakness  [R42] Lightheadedness (Primary)  [R03.0] Elevated blood pressure reading          ED Disposition Condition    Discharge Stable          ED Prescriptions    None       Follow-up Information       Follow up With Specialties Details Why Contact Info    Megha Brar NP Family Medicine Schedule an appointment as soon as possible for a visit   [phone] [id number] [phone]      [facility] Emergency Medicine  If symptoms worsen [address]  [facility] [id number] [phone]             Margarita Vnies PA-C  12/15/24 3908

## 2024-12-15 NOTE — ED NOTES
I-STAT Chem-8+ Results:   Value Reference Range   Sodium 144 136-145 mmol/L   Potassium  3.6 3.5-5.1 mmol/L   Chloride 105  mmol/L   Ionized Calcium 1.21 1.06-1.42 mmol/L   CO2 (measured) 25 23-29 mmol/L   Glucose 91  mg/dL   BUN 11 6-30 mg/dL   Creatinine 0.9 0.5-1.4 mg/dL   Hematocrit 35 36-54%

## 2024-12-16 LAB
OHS QRS DURATION: 84 MS
OHS QTC CALCULATION: 437 MS